# Patient Record
Sex: MALE | Race: WHITE | URBAN - METROPOLITAN AREA
[De-identification: names, ages, dates, MRNs, and addresses within clinical notes are randomized per-mention and may not be internally consistent; named-entity substitution may affect disease eponyms.]

---

## 2019-03-08 ENCOUNTER — HOSPITAL ENCOUNTER (OUTPATIENT)
Dept: MRI IMAGING | Age: 59
Discharge: HOME | End: 2019-03-10
Payer: COMMERCIAL

## 2019-03-08 DIAGNOSIS — R52 PAIN: ICD-10-CM

## 2019-03-08 PROCEDURE — 72158 MRI LUMBAR SPINE W/O & W/DYE: CPT

## 2019-03-08 PROCEDURE — 6360000004 HC RX CONTRAST MEDICATION: Performed by: RADIOLOGY

## 2019-03-08 PROCEDURE — A9579 GAD-BASE MR CONTRAST NOS,1ML: HCPCS | Performed by: RADIOLOGY

## 2019-03-08 RX ORDER — SODIUM CHLORIDE 0.9 % (FLUSH) 0.9 %
10 SYRINGE (ML) INJECTION 2 TIMES DAILY
Status: DISCONTINUED | OUTPATIENT
Start: 2019-03-08 | End: 2019-03-11 | Stop reason: HOSPADM

## 2019-03-08 RX ADMIN — GADOTERIDOL 20 ML: 279.3 INJECTION, SOLUTION INTRAVENOUS at 09:18

## 2024-04-02 ENCOUNTER — HOSPITAL ENCOUNTER (EMERGENCY)
Facility: HOSPITAL | Age: 64
Discharge: HOME | End: 2024-04-02
Payer: COMMERCIAL

## 2024-04-02 VITALS
BODY MASS INDEX: 40.8 KG/M2 | HEART RATE: 100 BPM | DIASTOLIC BLOOD PRESSURE: 85 MMHG | TEMPERATURE: 99.5 F | OXYGEN SATURATION: 96 % | RESPIRATION RATE: 20 BRPM | SYSTOLIC BLOOD PRESSURE: 150 MMHG | WEIGHT: 285 LBS | HEIGHT: 70 IN

## 2024-04-02 DIAGNOSIS — S39.012A LUMBAR STRAIN, INITIAL ENCOUNTER: Primary | ICD-10-CM

## 2024-04-02 PROCEDURE — 2500000005 HC RX 250 GENERAL PHARMACY W/O HCPCS: Performed by: NURSE PRACTITIONER

## 2024-04-02 PROCEDURE — 2500000001 HC RX 250 WO HCPCS SELF ADMINISTERED DRUGS (ALT 637 FOR MEDICARE OP): Performed by: NURSE PRACTITIONER

## 2024-04-02 PROCEDURE — 2500000004 HC RX 250 GENERAL PHARMACY W/ HCPCS (ALT 636 FOR OP/ED): Performed by: NURSE PRACTITIONER

## 2024-04-02 PROCEDURE — 96372 THER/PROPH/DIAG INJ SC/IM: CPT | Performed by: NURSE PRACTITIONER

## 2024-04-02 PROCEDURE — 99283 EMERGENCY DEPT VISIT LOW MDM: CPT

## 2024-04-02 RX ORDER — KETOROLAC TROMETHAMINE 30 MG/ML
15 INJECTION, SOLUTION INTRAMUSCULAR; INTRAVENOUS ONCE
Status: COMPLETED | OUTPATIENT
Start: 2024-04-02 | End: 2024-04-02

## 2024-04-02 RX ORDER — LIDOCAINE 50 MG/G
1 PATCH TOPICAL DAILY
Qty: 7 PATCH | Refills: 0 | Status: SHIPPED | OUTPATIENT
Start: 2024-04-02

## 2024-04-02 RX ORDER — HYDROCODONE BITARTRATE AND ACETAMINOPHEN 5; 325 MG/1; MG/1
1 TABLET ORAL EVERY 6 HOURS PRN
Qty: 12 TABLET | Refills: 0 | Status: SHIPPED | OUTPATIENT
Start: 2024-04-02 | End: 2024-04-18 | Stop reason: HOSPADM

## 2024-04-02 RX ORDER — HYDROCODONE BITARTRATE AND ACETAMINOPHEN 5; 325 MG/1; MG/1
1 TABLET ORAL ONCE
Status: COMPLETED | OUTPATIENT
Start: 2024-04-02 | End: 2024-04-02

## 2024-04-02 RX ORDER — LIDOCAINE 560 MG/1
1 PATCH PERCUTANEOUS; TOPICAL; TRANSDERMAL ONCE
Status: DISCONTINUED | OUTPATIENT
Start: 2024-04-02 | End: 2024-04-02 | Stop reason: HOSPADM

## 2024-04-02 RX ORDER — CYCLOBENZAPRINE HCL 10 MG
10 TABLET ORAL 2 TIMES DAILY PRN
Qty: 9 TABLET | Refills: 0 | Status: SHIPPED | OUTPATIENT
Start: 2024-04-02

## 2024-04-02 RX ORDER — NAPROXEN 500 MG/1
500 TABLET ORAL
Qty: 30 TABLET | Refills: 0 | Status: SHIPPED | OUTPATIENT
Start: 2024-04-02 | End: 2024-04-18 | Stop reason: HOSPADM

## 2024-04-02 RX ADMIN — HYDROCODONE BITARTRATE AND ACETAMINOPHEN 1 TABLET: 5; 325 TABLET ORAL at 19:06

## 2024-04-02 RX ADMIN — KETOROLAC TROMETHAMINE 15 MG: 30 INJECTION, SOLUTION INTRAMUSCULAR at 19:06

## 2024-04-02 RX ADMIN — LIDOCAINE 4% 1 PATCH: 40 PATCH TOPICAL at 19:06

## 2024-04-02 ASSESSMENT — PAIN DESCRIPTION - ONSET: ONSET: ONGOING

## 2024-04-02 ASSESSMENT — PAIN DESCRIPTION - PAIN TYPE: TYPE: ACUTE PAIN

## 2024-04-02 ASSESSMENT — PAIN - FUNCTIONAL ASSESSMENT
PAIN_FUNCTIONAL_ASSESSMENT: 0-10

## 2024-04-02 ASSESSMENT — PAIN DESCRIPTION - LOCATION: LOCATION: BACK

## 2024-04-02 ASSESSMENT — PAIN SCALES - GENERAL
PAINLEVEL_OUTOF10: 8
PAINLEVEL_OUTOF10: 8
PAINLEVEL_OUTOF10: 0 - NO PAIN

## 2024-04-02 ASSESSMENT — COLUMBIA-SUICIDE SEVERITY RATING SCALE - C-SSRS
2. HAVE YOU ACTUALLY HAD ANY THOUGHTS OF KILLING YOURSELF?: NO
1. IN THE PAST MONTH, HAVE YOU WISHED YOU WERE DEAD OR WISHED YOU COULD GO TO SLEEP AND NOT WAKE UP?: NO
6. HAVE YOU EVER DONE ANYTHING, STARTED TO DO ANYTHING, OR PREPARED TO DO ANYTHING TO END YOUR LIFE?: NO

## 2024-04-02 ASSESSMENT — PAIN DESCRIPTION - ORIENTATION: ORIENTATION: LOWER

## 2024-04-02 ASSESSMENT — PAIN DESCRIPTION - DESCRIPTORS: DESCRIPTORS: ACHING;PRESSURE

## 2024-04-02 ASSESSMENT — PAIN DESCRIPTION - FREQUENCY: FREQUENCY: CONSTANT/CONTINUOUS

## 2024-04-02 ASSESSMENT — PAIN DESCRIPTION - PROGRESSION: CLINICAL_PROGRESSION: NOT CHANGED

## 2024-04-02 NOTE — ED PROVIDER NOTES
HPI   Chief Complaint   Patient presents with    Back Pain     Lower back pain radiating down left leg.       63-year-old male presents emergency department, states about a week ago he was bending over to reach for something, felt a pulling pain in his right lower back.  Patient states since then has had low back pain.  States worse pain with position changes.  States he feels some numbness in his feet bilaterally, pain also radiates from the back down to the right hip area.    Denies incontinence of bowel or bladder, denies saddle anesthesia.    Patient states 11 years ago he did have back surgery for slipped disks, states he was told that they may cause problems in the future.      History provided by:  Patient   used: No                        Evie Coma Scale Score: 15                     Patient History   Past Medical History:   Diagnosis Date    Cirrhosis (CMS/HCC)     Diabetes mellitus (CMS/HCC)     Hypertension     Other conditions influencing health status 11/03/2013    Ureterocele    Personal history of other diseases of the digestive system     History of gastroesophageal reflux (GERD)    Personal history of other endocrine, nutritional and metabolic disease     History of hyperlipidemia     Past Surgical History:   Procedure Laterality Date    HERNIA REPAIR  07/30/2014    Hernia Repair    KNEE ARTHROSCOPY W/ DEBRIDEMENT  07/30/2014    Arthroscopy Knee Right    OTHER SURGICAL HISTORY  08/18/2014    Percutaneous Ablation Of Renal Tumor(S)    OTHER SURGICAL HISTORY  08/18/2014    Kidney Surgery Laparoscopic Partial Nephrectomy    TONSILLECTOMY  07/30/2014    Tonsillectomy     No family history on file.  Social History     Tobacco Use    Smoking status: Never     Passive exposure: Never    Smokeless tobacco: Never   Vaping Use    Vaping Use: Never used   Substance Use Topics    Alcohol use: Defer    Drug use: Never       Physical Exam   ED Triage Vitals   Temperature Heart Rate  Respirations BP   04/02/24 1833 04/02/24 1833 04/02/24 1833 04/02/24 1834   37.5 °C (99.5 °F) (!) 119 20 169/84      Pulse Ox Temp Source Heart Rate Source Patient Position   04/02/24 1834 04/02/24 1833 04/02/24 1833 04/02/24 1834   96 % Temporal Monitor Sitting      BP Location FiO2 (%)     04/02/24 1834 --     Right arm        Physical Exam  Back Pain:   Gen.: Vitals noted no distress. Afebrile.   Heart: Regular rate rhythm no murmur.   Lungs: Clear to auscultation bilaterally with good aeration and no adventitious breath sounds.   Abdomen: Soft, nontender, nonsurgical throughout. Normoactive bowel sounds. No pulsatile masses or abdominal bruits to auscultation.   Back: There is tenderness to palpation in the midline and paraspinal planes throughout the LS. Normal motor sensory, symmetric reflexes, strong equal peripheral pulses, normal Babinski's bilaterally.   Skin: No rash.   Neuro: No focal neurologic deficits, NIH score of 0.     ED Course & MDM   Diagnoses as of 04/02/24 1902   Lumbar strain, initial encounter       Medical Decision Making  Patient describes back pain, worse on the right side, rating around to the hip.    Describes some numbness in his feet but states does have history of neuropathy.  No saddle anesthesia or dysfunction of bowel or bladder.    No red flag symptoms today, discussed initiating medications for low back strain, close follow-up with Ortho spine for reevaluation and possible imaging at that time.    Patient given Toradol, hydrocodone/acetaminophen and a Lidoderm patch placed in the department, will be discharged home on similar medications, with the addition of Flexeril.    Discussed return with any worsening symptoms or any additional concerns.    Procedure  Procedures     Kavya Barrett, SONY-CNP  04/02/24 1907

## 2024-04-07 ENCOUNTER — APPOINTMENT (OUTPATIENT)
Dept: RADIOLOGY | Facility: HOSPITAL | Age: 64
End: 2024-04-07
Payer: COMMERCIAL

## 2024-04-07 ENCOUNTER — HOSPITAL ENCOUNTER (EMERGENCY)
Facility: HOSPITAL | Age: 64
Discharge: HOME | End: 2024-04-07
Payer: COMMERCIAL

## 2024-04-07 VITALS
BODY MASS INDEX: 42.89 KG/M2 | OXYGEN SATURATION: 99 % | RESPIRATION RATE: 18 BRPM | HEART RATE: 75 BPM | SYSTOLIC BLOOD PRESSURE: 105 MMHG | WEIGHT: 283 LBS | TEMPERATURE: 97.9 F | HEIGHT: 68 IN | DIASTOLIC BLOOD PRESSURE: 56 MMHG

## 2024-04-07 DIAGNOSIS — G89.29 ACUTE EXACERBATION OF CHRONIC LOW BACK PAIN: Primary | ICD-10-CM

## 2024-04-07 DIAGNOSIS — M54.50 ACUTE EXACERBATION OF CHRONIC LOW BACK PAIN: Primary | ICD-10-CM

## 2024-04-07 PROCEDURE — 2500000001 HC RX 250 WO HCPCS SELF ADMINISTERED DRUGS (ALT 637 FOR MEDICARE OP): Performed by: REGISTERED NURSE

## 2024-04-07 PROCEDURE — 99284 EMERGENCY DEPT VISIT MOD MDM: CPT | Mod: 25

## 2024-04-07 PROCEDURE — 96372 THER/PROPH/DIAG INJ SC/IM: CPT | Performed by: REGISTERED NURSE

## 2024-04-07 PROCEDURE — 2500000005 HC RX 250 GENERAL PHARMACY W/O HCPCS: Performed by: REGISTERED NURSE

## 2024-04-07 PROCEDURE — 72131 CT LUMBAR SPINE W/O DYE: CPT

## 2024-04-07 PROCEDURE — 2500000004 HC RX 250 GENERAL PHARMACY W/ HCPCS (ALT 636 FOR OP/ED): Performed by: REGISTERED NURSE

## 2024-04-07 PROCEDURE — 72131 CT LUMBAR SPINE W/O DYE: CPT | Mod: FOREIGN READ | Performed by: RADIOLOGY

## 2024-04-07 RX ORDER — OXYCODONE AND ACETAMINOPHEN 5; 325 MG/1; MG/1
1 TABLET ORAL ONCE
Status: COMPLETED | OUTPATIENT
Start: 2024-04-07 | End: 2024-04-07

## 2024-04-07 RX ORDER — ORPHENADRINE CITRATE 30 MG/ML
60 INJECTION INTRAMUSCULAR; INTRAVENOUS ONCE
Status: COMPLETED | OUTPATIENT
Start: 2024-04-07 | End: 2024-04-07

## 2024-04-07 RX ORDER — OXYCODONE AND ACETAMINOPHEN 5; 325 MG/1; MG/1
1 TABLET ORAL EVERY 6 HOURS PRN
Qty: 5 TABLET | Refills: 0 | Status: ON HOLD | OUTPATIENT
Start: 2024-04-07 | End: 2024-04-18

## 2024-04-07 RX ORDER — LIDOCAINE 560 MG/1
1 PATCH PERCUTANEOUS; TOPICAL; TRANSDERMAL ONCE
Status: DISCONTINUED | OUTPATIENT
Start: 2024-04-07 | End: 2024-04-07 | Stop reason: HOSPADM

## 2024-04-07 RX ORDER — KETOROLAC TROMETHAMINE 30 MG/ML
30 INJECTION, SOLUTION INTRAMUSCULAR; INTRAVENOUS ONCE
Status: COMPLETED | OUTPATIENT
Start: 2024-04-07 | End: 2024-04-07

## 2024-04-07 RX ADMIN — OXYCODONE HYDROCHLORIDE AND ACETAMINOPHEN 1 TABLET: 5; 325 TABLET ORAL at 10:01

## 2024-04-07 RX ADMIN — LIDOCAINE 1 PATCH: 4 PATCH TOPICAL at 10:01

## 2024-04-07 RX ADMIN — ORPHENADRINE CITRATE 60 MG: 60 INJECTION INTRAMUSCULAR; INTRAVENOUS at 10:01

## 2024-04-07 RX ADMIN — KETOROLAC TROMETHAMINE 30 MG: 30 INJECTION, SOLUTION INTRAMUSCULAR at 10:01

## 2024-04-07 ASSESSMENT — LIFESTYLE VARIABLES
HAVE YOU EVER FELT YOU SHOULD CUT DOWN ON YOUR DRINKING: NO
EVER FELT BAD OR GUILTY ABOUT YOUR DRINKING: NO
EVER HAD A DRINK FIRST THING IN THE MORNING TO STEADY YOUR NERVES TO GET RID OF A HANGOVER: NO
TOTAL SCORE: 0
HAVE PEOPLE ANNOYED YOU BY CRITICIZING YOUR DRINKING: NO

## 2024-04-07 ASSESSMENT — PAIN SCALES - GENERAL
PAINLEVEL_OUTOF10: 8
PAINLEVEL_OUTOF10: 5 - MODERATE PAIN

## 2024-04-07 ASSESSMENT — COLUMBIA-SUICIDE SEVERITY RATING SCALE - C-SSRS
1. IN THE PAST MONTH, HAVE YOU WISHED YOU WERE DEAD OR WISHED YOU COULD GO TO SLEEP AND NOT WAKE UP?: NO
2. HAVE YOU ACTUALLY HAD ANY THOUGHTS OF KILLING YOURSELF?: NO
6. HAVE YOU EVER DONE ANYTHING, STARTED TO DO ANYTHING, OR PREPARED TO DO ANYTHING TO END YOUR LIFE?: NO

## 2024-04-07 ASSESSMENT — PAIN - FUNCTIONAL ASSESSMENT
PAIN_FUNCTIONAL_ASSESSMENT: 0-10
PAIN_FUNCTIONAL_ASSESSMENT: 0-10

## 2024-04-07 NOTE — ED PROVIDER NOTES
HPI   Chief Complaint   Patient presents with    Back Pain     Right lower back pain seen last week also         History provided by:  Patient and spouse   used: No      63-year-old male with past medical history significant for cirrhosis, diabetes, hypertension, dyslipidemia, remote renal cell cancer, and chronic lower back pain presents to the emergency department today for evaluation of right lower back pain.  Patient was initially seen examined on 4/2/2024 and was discharged home with medications to ease symptoms.  Patient was directed to follow-up with his neurosurgeon.  Patient tells me that he did reach out to this provider but was told to go to pain management first.  Patient tells me that he has an appointment with his pain management doctor on 4/16/2024.  Patient tells me that his pain doctor only gives him gabapentin and is not provide any type of injections nor does he feel relief of symptoms.  Patient denies any new or worsening symptoms he tells me that the pain is the same in his right lower back and radiates down his right leg.  Patient denies any injury or trauma.  Patient does report associated symptoms of numbness in his bilateral feet however he tells me that this is secondary to his neuropathy.  Patient denies saddle anesthesia, or dysfunction of bowel or bladder.                  Evie Coma Scale Score: 15                     Patient History   Past Medical History:   Diagnosis Date    Cirrhosis (CMS/HCC)     Diabetes mellitus (CMS/HCC)     Hypertension     Other conditions influencing health status 11/03/2013    Ureterocele    Personal history of other diseases of the digestive system     History of gastroesophageal reflux (GERD)    Personal history of other endocrine, nutritional and metabolic disease     History of hyperlipidemia     Past Surgical History:   Procedure Laterality Date    HERNIA REPAIR  07/30/2014    Hernia Repair    KNEE ARTHROSCOPY W/ DEBRIDEMENT   07/30/2014    Arthroscopy Knee Right    OTHER SURGICAL HISTORY  08/18/2014    Percutaneous Ablation Of Renal Tumor(S)    OTHER SURGICAL HISTORY  08/18/2014    Kidney Surgery Laparoscopic Partial Nephrectomy    TONSILLECTOMY  07/30/2014    Tonsillectomy     No family history on file.  Social History     Tobacco Use    Smoking status: Never     Passive exposure: Never    Smokeless tobacco: Never   Vaping Use    Vaping Use: Never used   Substance Use Topics    Alcohol use: Defer    Drug use: Never       Physical Exam   ED Triage Vitals [04/07/24 0932]   Temperature Heart Rate Respirations BP   36.6 °C (97.9 °F) 91 16 --      Pulse Ox Temp src Heart Rate Source Patient Position   98 % -- -- --      BP Location FiO2 (%)     -- --       Physical Exam  Vitals and nursing note reviewed.   Constitutional:       Appearance: Normal appearance.   Cardiovascular:      Rate and Rhythm: Normal rate.      Pulses: Normal pulses.   Pulmonary:      Effort: Pulmonary effort is normal.   Musculoskeletal:      Right hip: Tenderness (right paraspinal tenderness) present.   Skin:     General: Skin is warm and dry.      Capillary Refill: Capillary refill takes less than 2 seconds.   Neurological:      General: No focal deficit present.      Mental Status: He is alert and oriented to person, place, and time.   Psychiatric:         Mood and Affect: Mood normal.         Behavior: Behavior normal.         ED Course & MDM   Diagnoses as of 04/07/24 1208   Acute exacerbation of chronic low back pain       Medical Decision Making  Patient seen examined emergency department; patient is chronically ill in appearance but does not appear in acute distress.  Patient is ambulating steadily with his cane and this is his baseline.  Patient does have pain with palpation of the right paraspinal musculature of the lumbar spine.  Patient is able to extend and flex knees.  Patient is able to point toes.  Patient's light touch sensation of the inner thigh is  intact.  Discussion held with patient and his wife regarding imaging and pain management.  They tell me they do not have a neurosurgeon at this time as the neurosurgeon they saw in the previous group left.  They have not established with a new 1.  At this time will order CT imaging of the lumbar spine for further evaluation of patient's chronic lower back pain.  Patient will be given IM Toradol IM Norflex, Percocet and Norflex for his pain.  Additionally we discussed following up with  orthopedics as I do have a walk-in clinic and a spine surgeon available in their group.    CT imaging does show healed wedge compression deformity at T12 as well as ossification of the posterior longitudinal ligament which is leading to impingement on the thecal sac from T12-L1.    Upon reevaluation patient tells me he is feeling better post administration of medications.  Patient tells me that the insurance would not cover the lidocaine patch that he was discharged with from his last visit.  Patient does have a patch on I did tell him this will cover him for 12 hours.  Patient will additionally be sent with a short course of narcotics.  Patient arrived with [facility] for orthopedics for follow-up.  Patient encouraged to call on Monday or self present to the walk-in clinic.  All patient's questions and concerns were addressed prior to discharge.  Patient discharged home in stable condition.    Labs Reviewed - No data to display    CT lumbar spine wo IV contrast   Final Result   1.  HEALED MILD WEDGE COMPRESSION DEFORMITY AT T12.  NO EVIDENCE OF   ACUTE OSSEOUS PATHOLOGY.   2.  THERE IS OSSIFICATION OF POSTERIOR LONGITUDINAL LIGAMENT WHICH   LEADS TO IMPINGEMENT UPON THE THECAL SAC FROM THE T12-L1 LEVEL THROUGH   THE L4-L5 LEVEL.  CENTRAL STENOSIS IS NOT PRESENT.       Signed by Papi Goodson MD            Procedure  Procedures     Rose Mckeon, SONY-CNP  04/07/24 8044

## 2024-04-10 ENCOUNTER — APPOINTMENT (OUTPATIENT)
Dept: ORTHOPEDIC SURGERY | Facility: CLINIC | Age: 64
End: 2024-04-10
Payer: COMMERCIAL

## 2024-04-14 ENCOUNTER — APPOINTMENT (OUTPATIENT)
Dept: RADIOLOGY | Facility: HOSPITAL | Age: 64
DRG: 552 | End: 2024-04-14
Payer: MEDICARE

## 2024-04-14 ENCOUNTER — HOSPITAL ENCOUNTER (INPATIENT)
Facility: HOSPITAL | Age: 64
LOS: 2 days | Discharge: HOME | DRG: 552 | End: 2024-04-18
Attending: STUDENT IN AN ORGANIZED HEALTH CARE EDUCATION/TRAINING PROGRAM | Admitting: STUDENT IN AN ORGANIZED HEALTH CARE EDUCATION/TRAINING PROGRAM
Payer: MEDICARE

## 2024-04-14 DIAGNOSIS — M54.9 INTRACTABLE BACK PAIN: Primary | ICD-10-CM

## 2024-04-14 DIAGNOSIS — M54.40 BACK PAIN OF LUMBOSACRAL REGION WITH SCIATICA: ICD-10-CM

## 2024-04-14 DIAGNOSIS — G89.29 ACUTE EXACERBATION OF CHRONIC LOW BACK PAIN: ICD-10-CM

## 2024-04-14 DIAGNOSIS — M79.89 REDNESS AND SWELLING OF UPPER ARM: ICD-10-CM

## 2024-04-14 DIAGNOSIS — R23.8 REDNESS AND SWELLING OF UPPER ARM: ICD-10-CM

## 2024-04-14 DIAGNOSIS — M54.50 ACUTE EXACERBATION OF CHRONIC LOW BACK PAIN: ICD-10-CM

## 2024-04-14 LAB
ALBUMIN SERPL BCP-MCNC: 4.3 G/DL (ref 3.4–5)
ALP SERPL-CCNC: 36 U/L (ref 33–136)
ALT SERPL W P-5'-P-CCNC: 43 U/L (ref 10–52)
ANION GAP SERPL CALC-SCNC: 14 MMOL/L (ref 10–20)
AST SERPL W P-5'-P-CCNC: 50 U/L (ref 9–39)
BASOPHILS # BLD AUTO: 0.04 X10*3/UL (ref 0–0.1)
BASOPHILS NFR BLD AUTO: 0.6 %
BILIRUB SERPL-MCNC: 0.7 MG/DL (ref 0–1.2)
BUN SERPL-MCNC: 23 MG/DL (ref 6–23)
CALCIUM SERPL-MCNC: 9.6 MG/DL (ref 8.6–10.3)
CHLORIDE SERPL-SCNC: 100 MMOL/L (ref 98–107)
CO2 SERPL-SCNC: 25 MMOL/L (ref 21–32)
CREAT SERPL-MCNC: 1 MG/DL (ref 0.5–1.3)
CRP SERPL-MCNC: 0.3 MG/DL
EGFRCR SERPLBLD CKD-EPI 2021: 86 ML/MIN/1.73M*2
EOSINOPHIL # BLD AUTO: 0.27 X10*3/UL (ref 0–0.7)
EOSINOPHIL NFR BLD AUTO: 4.2 %
ERYTHROCYTE [DISTWIDTH] IN BLOOD BY AUTOMATED COUNT: 13 % (ref 11.5–14.5)
ERYTHROCYTE [SEDIMENTATION RATE] IN BLOOD BY WESTERGREN METHOD: 28 MM/H (ref 0–20)
GLUCOSE BLD MANUAL STRIP-MCNC: 374 MG/DL (ref 74–99)
GLUCOSE SERPL-MCNC: 128 MG/DL (ref 74–99)
HCT VFR BLD AUTO: 36.1 % (ref 41–52)
HGB BLD-MCNC: 12.5 G/DL (ref 13.5–17.5)
IMM GRANULOCYTES # BLD AUTO: 0.03 X10*3/UL (ref 0–0.7)
IMM GRANULOCYTES NFR BLD AUTO: 0.5 % (ref 0–0.9)
LYMPHOCYTES # BLD AUTO: 1.77 X10*3/UL (ref 1.2–4.8)
LYMPHOCYTES NFR BLD AUTO: 27.8 %
MCH RBC QN AUTO: 30.7 PG (ref 26–34)
MCHC RBC AUTO-ENTMCNC: 34.6 G/DL (ref 32–36)
MCV RBC AUTO: 89 FL (ref 80–100)
MONOCYTES # BLD AUTO: 0.26 X10*3/UL (ref 0.1–1)
MONOCYTES NFR BLD AUTO: 4.1 %
NEUTROPHILS # BLD AUTO: 3.99 X10*3/UL (ref 1.2–7.7)
NEUTROPHILS NFR BLD AUTO: 62.8 %
NRBC BLD-RTO: 0 /100 WBCS (ref 0–0)
PLATELET # BLD AUTO: 174 X10*3/UL (ref 150–450)
POTASSIUM SERPL-SCNC: 4.3 MMOL/L (ref 3.5–5.3)
PROT SERPL-MCNC: 8.3 G/DL (ref 6.4–8.2)
RBC # BLD AUTO: 4.07 X10*6/UL (ref 4.5–5.9)
SODIUM SERPL-SCNC: 135 MMOL/L (ref 136–145)
WBC # BLD AUTO: 6.4 X10*3/UL (ref 4.4–11.3)

## 2024-04-14 PROCEDURE — G0378 HOSPITAL OBSERVATION PER HR: HCPCS

## 2024-04-14 PROCEDURE — 82947 ASSAY GLUCOSE BLOOD QUANT: CPT

## 2024-04-14 PROCEDURE — 2500000002 HC RX 250 W HCPCS SELF ADMINISTERED DRUGS (ALT 637 FOR MEDICARE OP, ALT 636 FOR OP/ED): Performed by: STUDENT IN AN ORGANIZED HEALTH CARE EDUCATION/TRAINING PROGRAM

## 2024-04-14 PROCEDURE — 86140 C-REACTIVE PROTEIN: CPT | Performed by: PHYSICIAN ASSISTANT

## 2024-04-14 PROCEDURE — 96372 THER/PROPH/DIAG INJ SC/IM: CPT | Performed by: STUDENT IN AN ORGANIZED HEALTH CARE EDUCATION/TRAINING PROGRAM

## 2024-04-14 PROCEDURE — 96375 TX/PRO/DX INJ NEW DRUG ADDON: CPT

## 2024-04-14 PROCEDURE — 80053 COMPREHEN METABOLIC PANEL: CPT | Performed by: PHYSICIAN ASSISTANT

## 2024-04-14 PROCEDURE — 72146 MRI CHEST SPINE W/O DYE: CPT | Performed by: RADIOLOGY

## 2024-04-14 PROCEDURE — 36415 COLL VENOUS BLD VENIPUNCTURE: CPT | Performed by: PHYSICIAN ASSISTANT

## 2024-04-14 PROCEDURE — 99285 EMERGENCY DEPT VISIT HI MDM: CPT | Mod: 25

## 2024-04-14 PROCEDURE — 85025 COMPLETE CBC W/AUTO DIFF WBC: CPT | Performed by: PHYSICIAN ASSISTANT

## 2024-04-14 PROCEDURE — 72146 MRI CHEST SPINE W/O DYE: CPT

## 2024-04-14 PROCEDURE — 2500000004 HC RX 250 GENERAL PHARMACY W/ HCPCS (ALT 636 FOR OP/ED): Performed by: PHYSICIAN ASSISTANT

## 2024-04-14 PROCEDURE — 85652 RBC SED RATE AUTOMATED: CPT | Performed by: PHYSICIAN ASSISTANT

## 2024-04-14 PROCEDURE — 96374 THER/PROPH/DIAG INJ IV PUSH: CPT | Mod: 59

## 2024-04-14 PROCEDURE — 99222 1ST HOSP IP/OBS MODERATE 55: CPT | Performed by: STUDENT IN AN ORGANIZED HEALTH CARE EDUCATION/TRAINING PROGRAM

## 2024-04-14 PROCEDURE — 2500000004 HC RX 250 GENERAL PHARMACY W/ HCPCS (ALT 636 FOR OP/ED): Performed by: STUDENT IN AN ORGANIZED HEALTH CARE EDUCATION/TRAINING PROGRAM

## 2024-04-14 RX ORDER — LIDOCAINE 560 MG/1
2 PATCH PERCUTANEOUS; TOPICAL; TRANSDERMAL DAILY
Status: DISCONTINUED | OUTPATIENT
Start: 2024-04-15 | End: 2024-04-18 | Stop reason: HOSPADM

## 2024-04-14 RX ORDER — NAPROXEN SODIUM 220 MG/1
81 TABLET, FILM COATED ORAL DAILY
COMMUNITY

## 2024-04-14 RX ORDER — AMLODIPINE BESYLATE 5 MG/1
10 TABLET ORAL DAILY
Status: DISCONTINUED | OUTPATIENT
Start: 2024-04-15 | End: 2024-04-18 | Stop reason: HOSPADM

## 2024-04-14 RX ORDER — POLYETHYLENE GLYCOL 3350 17 G/17G
17 POWDER, FOR SOLUTION ORAL DAILY
Status: DISCONTINUED | OUTPATIENT
Start: 2024-04-15 | End: 2024-04-18 | Stop reason: HOSPADM

## 2024-04-14 RX ORDER — INSULIN GLARGINE 300 [IU]/ML
100 INJECTION, SOLUTION SUBCUTANEOUS 2 TIMES DAILY
COMMUNITY
Start: 2024-03-08

## 2024-04-14 RX ORDER — FENTANYL CITRATE 50 UG/ML
50 INJECTION, SOLUTION INTRAMUSCULAR; INTRAVENOUS ONCE
Status: COMPLETED | OUTPATIENT
Start: 2024-04-14 | End: 2024-04-14

## 2024-04-14 RX ORDER — HYDROMORPHONE HYDROCHLORIDE 1 MG/ML
0.6 INJECTION, SOLUTION INTRAMUSCULAR; INTRAVENOUS; SUBCUTANEOUS
Status: DISCONTINUED | OUTPATIENT
Start: 2024-04-14 | End: 2024-04-16

## 2024-04-14 RX ORDER — AMLODIPINE BESYLATE 10 MG/1
10 TABLET ORAL
COMMUNITY
Start: 2024-01-02

## 2024-04-14 RX ORDER — DEXTROSE 50 % IN WATER (D50W) INTRAVENOUS SYRINGE
25
Status: DISCONTINUED | OUTPATIENT
Start: 2024-04-14 | End: 2024-04-18 | Stop reason: HOSPADM

## 2024-04-14 RX ORDER — HYDROMORPHONE HYDROCHLORIDE 1 MG/ML
1 INJECTION, SOLUTION INTRAMUSCULAR; INTRAVENOUS; SUBCUTANEOUS ONCE
Status: COMPLETED | OUTPATIENT
Start: 2024-04-14 | End: 2024-04-14

## 2024-04-14 RX ORDER — CARVEDILOL 6.25 MG/1
6.25 TABLET ORAL 2 TIMES DAILY
COMMUNITY
Start: 2024-04-04

## 2024-04-14 RX ORDER — METFORMIN HYDROCHLORIDE 500 MG/1
1000 TABLET ORAL
COMMUNITY
Start: 2014-08-18

## 2024-04-14 RX ORDER — ONDANSETRON HYDROCHLORIDE 2 MG/ML
4 INJECTION, SOLUTION INTRAVENOUS EVERY 4 HOURS PRN
Status: DISCONTINUED | OUTPATIENT
Start: 2024-04-14 | End: 2024-04-18 | Stop reason: HOSPADM

## 2024-04-14 RX ORDER — INSULIN GLARGINE 100 [IU]/ML
68 INJECTION, SOLUTION SUBCUTANEOUS EVERY 12 HOURS
Status: DISCONTINUED | OUTPATIENT
Start: 2024-04-14 | End: 2024-04-18 | Stop reason: HOSPADM

## 2024-04-14 RX ORDER — ACETAMINOPHEN 325 MG/1
650 TABLET ORAL EVERY 6 HOURS
Status: DISCONTINUED | OUTPATIENT
Start: 2024-04-14 | End: 2024-04-18 | Stop reason: HOSPADM

## 2024-04-14 RX ORDER — GLIMEPIRIDE 4 MG/1
1 TABLET ORAL
COMMUNITY
Start: 2023-09-26

## 2024-04-14 RX ORDER — CARVEDILOL 6.25 MG/1
6.25 TABLET ORAL
Status: DISCONTINUED | OUTPATIENT
Start: 2024-04-15 | End: 2024-04-18 | Stop reason: HOSPADM

## 2024-04-14 RX ORDER — KETOROLAC TROMETHAMINE 30 MG/ML
30 INJECTION, SOLUTION INTRAMUSCULAR; INTRAVENOUS ONCE
Status: COMPLETED | OUTPATIENT
Start: 2024-04-14 | End: 2024-04-14

## 2024-04-14 RX ORDER — LORAZEPAM 2 MG/ML
1 INJECTION INTRAMUSCULAR ONCE
Status: DISCONTINUED | OUTPATIENT
Start: 2024-04-14 | End: 2024-04-14

## 2024-04-14 RX ORDER — OMEPRAZOLE 40 MG/1
40 CAPSULE, DELAYED RELEASE ORAL
COMMUNITY
Start: 2024-03-07

## 2024-04-14 RX ORDER — ONDANSETRON HYDROCHLORIDE 2 MG/ML
4 INJECTION, SOLUTION INTRAVENOUS ONCE
Status: COMPLETED | OUTPATIENT
Start: 2024-04-14 | End: 2024-04-14

## 2024-04-14 RX ORDER — INSULIN LISPRO 100 [IU]/ML
26 INJECTION, SOLUTION INTRAVENOUS; SUBCUTANEOUS
Status: DISCONTINUED | OUTPATIENT
Start: 2024-04-14 | End: 2024-04-18 | Stop reason: HOSPADM

## 2024-04-14 RX ORDER — FENOFIBRATE 134 MG/1
1 CAPSULE ORAL DAILY
COMMUNITY
Start: 2013-01-17

## 2024-04-14 RX ORDER — HYDROCHLOROTHIAZIDE 25 MG/1
1 TABLET ORAL
COMMUNITY
Start: 2024-01-26

## 2024-04-14 RX ORDER — ATORVASTATIN CALCIUM 20 MG/1
20 TABLET, FILM COATED ORAL
COMMUNITY
Start: 2015-10-01

## 2024-04-14 RX ORDER — LORAZEPAM 2 MG/ML
1 INJECTION INTRAMUSCULAR ONCE
Status: COMPLETED | OUTPATIENT
Start: 2024-04-14 | End: 2024-04-14

## 2024-04-14 RX ORDER — DEXTROSE 50 % IN WATER (D50W) INTRAVENOUS SYRINGE
12.5
Status: DISCONTINUED | OUTPATIENT
Start: 2024-04-14 | End: 2024-04-18 | Stop reason: HOSPADM

## 2024-04-14 RX ORDER — ACETAMINOPHEN 650 MG/1
650 SUPPOSITORY RECTAL EVERY 4 HOURS PRN
Status: DISCONTINUED | OUTPATIENT
Start: 2024-04-14 | End: 2024-04-18 | Stop reason: HOSPADM

## 2024-04-14 RX ORDER — INSULIN LISPRO 200 [IU]/ML
35 INJECTION, SOLUTION SUBCUTANEOUS
COMMUNITY
Start: 2024-03-08

## 2024-04-14 RX ORDER — ENOXAPARIN SODIUM 100 MG/ML
40 INJECTION SUBCUTANEOUS EVERY 12 HOURS SCHEDULED
Status: DISCONTINUED | OUTPATIENT
Start: 2024-04-14 | End: 2024-04-18 | Stop reason: HOSPADM

## 2024-04-14 RX ORDER — GABAPENTIN 300 MG/1
300 CAPSULE ORAL NIGHTLY
COMMUNITY

## 2024-04-14 RX ORDER — ICOSAPENT ETHYL 1 G/1
2 CAPSULE ORAL
COMMUNITY
Start: 2015-10-19

## 2024-04-14 RX ORDER — LOSARTAN POTASSIUM 100 MG/1
100 TABLET ORAL DAILY
Status: DISCONTINUED | OUTPATIENT
Start: 2024-04-15 | End: 2024-04-18 | Stop reason: HOSPADM

## 2024-04-14 RX ORDER — ACETAMINOPHEN 160 MG/5ML
650 SOLUTION ORAL EVERY 4 HOURS PRN
Status: DISCONTINUED | OUTPATIENT
Start: 2024-04-14 | End: 2024-04-18 | Stop reason: HOSPADM

## 2024-04-14 RX ORDER — NAPROXEN SODIUM 220 MG/1
81 TABLET, FILM COATED ORAL DAILY
Status: DISCONTINUED | OUTPATIENT
Start: 2024-04-15 | End: 2024-04-18 | Stop reason: HOSPADM

## 2024-04-14 RX ORDER — LOSARTAN POTASSIUM 100 MG/1
100 TABLET ORAL
COMMUNITY
Start: 2024-01-02

## 2024-04-14 RX ORDER — TALC
3 POWDER (GRAM) TOPICAL NIGHTLY PRN
Status: DISCONTINUED | OUTPATIENT
Start: 2024-04-14 | End: 2024-04-18 | Stop reason: HOSPADM

## 2024-04-14 RX ORDER — ACETAMINOPHEN 325 MG/1
650 TABLET ORAL EVERY 4 HOURS PRN
Status: DISCONTINUED | OUTPATIENT
Start: 2024-04-14 | End: 2024-04-18 | Stop reason: HOSPADM

## 2024-04-14 RX ORDER — PANTOPRAZOLE SODIUM 40 MG/1
40 TABLET, DELAYED RELEASE ORAL
Status: DISCONTINUED | OUTPATIENT
Start: 2024-04-15 | End: 2024-04-18 | Stop reason: HOSPADM

## 2024-04-14 RX ORDER — ATORVASTATIN CALCIUM 20 MG/1
20 TABLET, FILM COATED ORAL NIGHTLY
Status: DISCONTINUED | OUTPATIENT
Start: 2024-04-15 | End: 2024-04-18 | Stop reason: HOSPADM

## 2024-04-14 RX ADMIN — HYDROMORPHONE HYDROCHLORIDE 0.6 MG: 1 INJECTION, SOLUTION INTRAMUSCULAR; INTRAVENOUS; SUBCUTANEOUS at 22:52

## 2024-04-14 RX ADMIN — ENOXAPARIN SODIUM 40 MG: 40 INJECTION SUBCUTANEOUS at 22:57

## 2024-04-14 RX ADMIN — FENTANYL CITRATE 50 MCG: 50 INJECTION, SOLUTION INTRAMUSCULAR; INTRAVENOUS at 19:59

## 2024-04-14 RX ADMIN — HYDROMORPHONE HYDROCHLORIDE 1 MG: 1 INJECTION, SOLUTION INTRAMUSCULAR; INTRAVENOUS; SUBCUTANEOUS at 12:25

## 2024-04-14 RX ADMIN — INSULIN GLARGINE 68 UNITS: 100 INJECTION, SOLUTION SUBCUTANEOUS at 22:57

## 2024-04-14 RX ADMIN — ONDANSETRON 4 MG: 2 INJECTION INTRAMUSCULAR; INTRAVENOUS at 12:24

## 2024-04-14 RX ADMIN — METHYLPREDNISOLONE SODIUM SUCCINATE 125 MG: 125 INJECTION, POWDER, FOR SOLUTION INTRAMUSCULAR; INTRAVENOUS at 12:24

## 2024-04-14 RX ADMIN — LORAZEPAM 1 MG: 2 INJECTION INTRAMUSCULAR; INTRAVENOUS at 16:25

## 2024-04-14 RX ADMIN — KETOROLAC TROMETHAMINE 30 MG: 30 INJECTION, SOLUTION INTRAMUSCULAR at 12:24

## 2024-04-14 SDOH — SOCIAL STABILITY: SOCIAL INSECURITY: DO YOU FEEL ANYONE HAS EXPLOITED OR TAKEN ADVANTAGE OF YOU FINANCIALLY OR OF YOUR PERSONAL PROPERTY?: NO

## 2024-04-14 SDOH — SOCIAL STABILITY: SOCIAL INSECURITY: DO YOU FEEL UNSAFE GOING BACK TO THE PLACE WHERE YOU ARE LIVING?: NO

## 2024-04-14 SDOH — SOCIAL STABILITY: SOCIAL INSECURITY: ARE YOU OR HAVE YOU BEEN THREATENED OR ABUSED PHYSICALLY, EMOTIONALLY, OR SEXUALLY BY ANYONE?: NO

## 2024-04-14 SDOH — SOCIAL STABILITY: SOCIAL INSECURITY: HAS ANYONE EVER THREATENED TO HURT YOUR FAMILY OR YOUR PETS?: NO

## 2024-04-14 SDOH — SOCIAL STABILITY: SOCIAL INSECURITY: WERE YOU ABLE TO COMPLETE ALL THE BEHAVIORAL HEALTH SCREENINGS?: YES

## 2024-04-14 SDOH — SOCIAL STABILITY: SOCIAL INSECURITY: ARE THERE ANY APPARENT SIGNS OF INJURIES/BEHAVIORS THAT COULD BE RELATED TO ABUSE/NEGLECT?: NO

## 2024-04-14 SDOH — SOCIAL STABILITY: SOCIAL INSECURITY: ABUSE: ADULT

## 2024-04-14 SDOH — SOCIAL STABILITY: SOCIAL INSECURITY: HAVE YOU HAD THOUGHTS OF HARMING ANYONE ELSE?: NO

## 2024-04-14 SDOH — SOCIAL STABILITY: SOCIAL INSECURITY: DOES ANYONE TRY TO KEEP YOU FROM HAVING/CONTACTING OTHER FRIENDS OR DOING THINGS OUTSIDE YOUR HOME?: NO

## 2024-04-14 ASSESSMENT — PATIENT HEALTH QUESTIONNAIRE - PHQ9
1. LITTLE INTEREST OR PLEASURE IN DOING THINGS: NOT AT ALL
SUM OF ALL RESPONSES TO PHQ9 QUESTIONS 1 & 2: 0
2. FEELING DOWN, DEPRESSED OR HOPELESS: NOT AT ALL

## 2024-04-14 ASSESSMENT — PAIN - FUNCTIONAL ASSESSMENT
PAIN_FUNCTIONAL_ASSESSMENT: 0-10

## 2024-04-14 ASSESSMENT — ACTIVITIES OF DAILY LIVING (ADL)
ADEQUATE_TO_COMPLETE_ADL: YES
HEARING - LEFT EAR: FUNCTIONAL
DRESSING YOURSELF: INDEPENDENT
GROOMING: INDEPENDENT
WALKS IN HOME: INDEPENDENT
FEEDING YOURSELF: INDEPENDENT
TOILETING: INDEPENDENT
PATIENT'S MEMORY ADEQUATE TO SAFELY COMPLETE DAILY ACTIVITIES?: YES
HEARING - RIGHT EAR: FUNCTIONAL
LACK_OF_TRANSPORTATION: PATIENT DECLINED
BATHING: INDEPENDENT
JUDGMENT_ADEQUATE_SAFELY_COMPLETE_DAILY_ACTIVITIES: YES

## 2024-04-14 ASSESSMENT — PAIN SCALES - GENERAL
PAINLEVEL_OUTOF10: 10 - WORST POSSIBLE PAIN
PAINLEVEL_OUTOF10: 10 - WORST POSSIBLE PAIN
PAINLEVEL_OUTOF10: 3
PAINLEVEL_OUTOF10: 8
PAINLEVEL_OUTOF10: 6
PAINLEVEL_OUTOF10: 10 - WORST POSSIBLE PAIN
PAINLEVEL_OUTOF10: 2
PAINLEVEL_OUTOF10: 3
PAINLEVEL_OUTOF10: 10 - WORST POSSIBLE PAIN

## 2024-04-14 ASSESSMENT — PAIN DESCRIPTION - DESCRIPTORS: DESCRIPTORS: ACHING

## 2024-04-14 ASSESSMENT — LIFESTYLE VARIABLES
HAVE YOU EVER FELT YOU SHOULD CUT DOWN ON YOUR DRINKING: NO
HAVE PEOPLE ANNOYED YOU BY CRITICIZING YOUR DRINKING: NO
EVER HAD A DRINK FIRST THING IN THE MORNING TO STEADY YOUR NERVES TO GET RID OF A HANGOVER: NO
AUDIT-C TOTAL SCORE: 0
HOW OFTEN DO YOU HAVE 6 OR MORE DRINKS ON ONE OCCASION: NEVER
EVER FELT BAD OR GUILTY ABOUT YOUR DRINKING: NO
TOTAL SCORE: 0
HOW OFTEN DO YOU HAVE A DRINK CONTAINING ALCOHOL: NEVER
HOW MANY STANDARD DRINKS CONTAINING ALCOHOL DO YOU HAVE ON A TYPICAL DAY: PATIENT DOES NOT DRINK
SKIP TO QUESTIONS 9-10: 1
AUDIT-C TOTAL SCORE: 0

## 2024-04-14 ASSESSMENT — COGNITIVE AND FUNCTIONAL STATUS - GENERAL
PATIENT BASELINE BEDBOUND: NO
DRESSING REGULAR LOWER BODY CLOTHING: A LITTLE
WALKING IN HOSPITAL ROOM: A LITTLE
CLIMB 3 TO 5 STEPS WITH RAILING: A LITTLE
DAILY ACTIVITIY SCORE: 23
MOBILITY SCORE: 22

## 2024-04-14 ASSESSMENT — PAIN DESCRIPTION - FREQUENCY: FREQUENCY: CONSTANT/CONTINUOUS

## 2024-04-14 ASSESSMENT — PAIN DESCRIPTION - ORIENTATION
ORIENTATION: LOWER

## 2024-04-14 ASSESSMENT — PAIN DESCRIPTION - PROGRESSION: CLINICAL_PROGRESSION: NOT CHANGED

## 2024-04-14 ASSESSMENT — PAIN DESCRIPTION - LOCATION
LOCATION: BACK

## 2024-04-14 ASSESSMENT — PAIN DESCRIPTION - ONSET: ONSET: PROGRESSIVE

## 2024-04-14 ASSESSMENT — PAIN DESCRIPTION - PAIN TYPE: TYPE: ACUTE PAIN

## 2024-04-14 NOTE — PROGRESS NOTES
63-year-old male signed out to me at 1645 pending MRI results and admission.  Patient has history of lumbar herniated disc which has been under control after a prior surgery until several days ago when he suddenly began having low back pain radiating into his left leg.  He does not have any cauda equina symptoms or spinal cord impingement symptoms but he is unable to walk due to pain.  The prior provider was going to admit him for intractable back pain but the hospitalist requested MRIs of the thoracic and lumbar spine before admission.  Patient had snf through his MRI and began panicking and so the MRI was suspended.  I ordered more pain medication and Ativan but the MRI technologist left for the day before the patient could be taken back down.  I canceled the Ativan but we did go ahead and give him the fentanyl because he is having more pain.  I discussed the case with our hospitalist, Dr. Pang who kindly agrees to admit the patient and have the MRI done in the morning.  I discussed the results and plan for hospitalization with the patient and or family/friend if present. Questions were addressed. Patient and/or family/friend expressed understanding and agreement.

## 2024-04-14 NOTE — ED PROVIDER NOTES
HPI   Chief Complaint   Patient presents with    Back Pain     Pt was diag with a bulging disc sees a neurosurgeon on Tuesday  pt in a lot of pain        A 63-year-old male patient with history of diabetes, hypertension, GERD comes into the emergency department today with complaints of low back pain radiating to his left leg.  States started several weeks ago and has only been increasing in severity.  He describes that seen in the emergency department a couple weeks ago was evaluated discharged home.  Then was seen last Sunday when she was referred to orthopedics.   he went to the walk-in clinic Monday morning as instructed which was difficult for him secondary to his pain but they told him there is no one with spine there and there is no one to evaluate him.  They set him up to be seen Wednesday morning but did receive a phone call Wednesday morning stating that the spine doctor was not can begin again.   has been suffering with increasing pain to the point now where he is having difficulty even ambulating secondary to the pain.  States he can take a few steps but he has to lay back down to alleviate his pain.  Is the only position that the pain is somewhat more comfortable.  He rates pain a 9 out of 10 on the pain scale when he is laying down tends on the pain scale when he is trying to ambulate.  He denies any saddle anesthesia, loss bowel control, or any urinary retention.  States he has had a lumbar procedure performed about 10 years ago by Dr. De León for similar pain.  He is scheduled to see Dr. Lux in this coming Tuesday but feels like he would be unable to make it to this appointment secondary to his pain                          Evie Coma Scale Score: 15                     Patient History   Past Medical History:   Diagnosis Date    Cirrhosis (Multi)     Diabetes mellitus (Multi)     Hypertension     Other conditions influencing health status 11/03/2013    Ureterocele    Personal history  of other diseases of the digestive system     History of gastroesophageal reflux (GERD)    Personal history of other endocrine, nutritional and metabolic disease     History of hyperlipidemia     Past Surgical History:   Procedure Laterality Date    HERNIA REPAIR  07/30/2014    Hernia Repair    KNEE ARTHROSCOPY W/ DEBRIDEMENT  07/30/2014    Arthroscopy Knee Right    OTHER SURGICAL HISTORY  08/18/2014    Percutaneous Ablation Of Renal Tumor(S)    OTHER SURGICAL HISTORY  08/18/2014    Kidney Surgery Laparoscopic Partial Nephrectomy    TONSILLECTOMY  07/30/2014    Tonsillectomy     No family history on file.  Social History     Tobacco Use    Smoking status: Never     Passive exposure: Never    Smokeless tobacco: Never   Vaping Use    Vaping status: Never Used   Substance Use Topics    Alcohol use: Defer    Drug use: Never       Physical Exam   ED Triage Vitals [04/14/24 1123]   Temperature Heart Rate Respirations BP   36.5 °C (97.7 °F) 83 18 167/74      Pulse Ox Temp src Heart Rate Source Patient Position   96 % -- -- --      BP Location FiO2 (%)     -- --       Physical Exam  Constitutional:       General: He is in acute distress (Severe).      Appearance: Normal appearance. He is not ill-appearing or diaphoretic.   HENT:      Head: Normocephalic and atraumatic.      Nose: Nose normal.   Eyes:      Extraocular Movements: Extraocular movements intact.      Conjunctiva/sclera: Conjunctivae normal.   Cardiovascular:      Rate and Rhythm: Normal rate and regular rhythm.   Pulmonary:      Effort: Pulmonary effort is normal. No respiratory distress.      Breath sounds: Normal breath sounds. No stridor. No wheezing.   Musculoskeletal:         General: Tenderness (Tenderness palpation over the midline distal lumbar spine, left SI joint, straight leg raise positive) present. Normal range of motion.      Cervical back: Normal range of motion.   Skin:     General: Skin is warm and dry.   Neurological:      General: No focal  deficit present.      Mental Status: He is alert and oriented to person, place, and time. Mental status is at baseline.   Psychiatric:         Mood and Affect: Mood normal.         ED Course & MDM   Diagnoses as of 04/14/24 1646   Intractable back pain   Back pain of lumbosacral region with sciatica       Medical Decision Making  A 63-year-old male patient with history of diabetes, hypertension, GERD comes into the emergency department today with complaints of low back pain radiating to his left leg.  States started several weeks ago and has only been increasing in severity.  He describes that seen in the emergency department a couple weeks ago was evaluated discharged home.  Then was seen last Sunday when she was referred to orthopedics.  States he went to the walk-in clinic Monday morning as instructed which was difficult for him secondary to his pain but they told him there is no one with spine there and there is no one to evaluate him.  They set him up to be seen Wednesday morning but did receive a phone call Wednesday morning stating that the spine doctor was not can begin again.   has been suffering with increasing pain to the point now where he is having difficulty even ambulating secondary to the pain.  States he can take a few steps but he has to lay back down to alleviate his pain.  Is the only position that the pain is somewhat more comfortable.  He rates pain a 9 out of 10 on the pain scale when he is laying down tends on the pain scale when he is trying to ambulate.  He denies any saddle anesthesia, loss bowel control, or any urinary retention.  States he has had a lumbar procedure performed about 10 years ago by Dr. De León for similar pain.  He is scheduled to see Dr. Lux in this coming Tuesday but feels like he would be unable to make it to this appointment secondary to his pain    IV Dilaudid IV Toradol IV Solu-Medrol IV Zofran ordered for the patient.    Patient states that he had CT  performed last Sunday which I confirmed.  No need for new imaging today.  Patient does not require an emergent MRI as he does not have symptoms for cauda equina.  Despite pain medication patient's pain is still not well-controlled.  Patient does not feel comfortable going home as he states he can hardly even walk a few steps secondary to his pain.  Feels like he would not be able to make it to his neuro spine appointment on Tuesday.  Will admit patient to the hospital for intractable back pain.    Historian is the patient    Diagnosis: Intractable back pain, lumbosacral pain with sciatica    I discussed the case with the hospitalist Dr. Ness who is recommending that MRI of the L and T-spine be ordered performed in the ER and depending on results give him a call back.    Handoff to Celina Walker PA-C pending MRI studies and admission      Labs Reviewed   CBC WITH AUTO DIFFERENTIAL - Abnormal       Result Value    WBC 6.4      nRBC 0.0      RBC 4.07 (*)     Hemoglobin 12.5 (*)     Hematocrit 36.1 (*)     MCV 89      MCH 30.7      MCHC 34.6      RDW 13.0      Platelets 174      Neutrophils % 62.8      Immature Granulocytes %, Automated 0.5      Lymphocytes % 27.8      Monocytes % 4.1      Eosinophils % 4.2      Basophils % 0.6      Neutrophils Absolute 3.99      Immature Granulocytes Absolute, Automated 0.03      Lymphocytes Absolute 1.77      Monocytes Absolute 0.26      Eosinophils Absolute 0.27      Basophils Absolute 0.04     COMPREHENSIVE METABOLIC PANEL - Abnormal    Glucose 128 (*)     Sodium 135 (*)     Potassium 4.3      Chloride 100      Bicarbonate 25      Anion Gap 14      Urea Nitrogen 23      Creatinine 1.00      eGFR 86      Calcium 9.6      Albumin 4.3      Alkaline Phosphatase 36      Total Protein 8.3 (*)     AST 50 (*)     Bilirubin, Total 0.7      ALT 43     SEDIMENTATION RATE, AUTOMATED - Abnormal    Sedimentation Rate 28 (*)    C-REACTIVE PROTEIN - Normal    C-Reactive Protein 0.30          MR  lumbar spine w and wo IV contrast    (Results Pending)   MR thoracic spine w and wo IV contrast    (Results Pending)       Procedure  Procedures     Greg Arevalo PA-C  04/14/24 7581

## 2024-04-15 ENCOUNTER — APPOINTMENT (OUTPATIENT)
Dept: RADIOLOGY | Facility: HOSPITAL | Age: 64
DRG: 552 | End: 2024-04-15
Payer: MEDICARE

## 2024-04-15 ENCOUNTER — TELEPHONE (OUTPATIENT)
Dept: PAIN MANAGEMENT | Age: 64
End: 2024-04-15

## 2024-04-15 LAB
ANION GAP SERPL CALC-SCNC: 15 MMOL/L (ref 10–20)
BUN SERPL-MCNC: 31 MG/DL (ref 6–23)
CALCIUM SERPL-MCNC: 9.7 MG/DL (ref 8.6–10.3)
CHLORIDE SERPL-SCNC: 97 MMOL/L (ref 98–107)
CO2 SERPL-SCNC: 28 MMOL/L (ref 21–32)
CREAT SERPL-MCNC: 1.27 MG/DL (ref 0.5–1.3)
EGFRCR SERPLBLD CKD-EPI 2021: 64 ML/MIN/1.73M*2
ERYTHROCYTE [DISTWIDTH] IN BLOOD BY AUTOMATED COUNT: 13.2 % (ref 11.5–14.5)
GLUCOSE BLD MANUAL STRIP-MCNC: 251 MG/DL (ref 74–99)
GLUCOSE BLD MANUAL STRIP-MCNC: 262 MG/DL (ref 74–99)
GLUCOSE BLD MANUAL STRIP-MCNC: 294 MG/DL (ref 74–99)
GLUCOSE BLD MANUAL STRIP-MCNC: 298 MG/DL (ref 74–99)
GLUCOSE SERPL-MCNC: 299 MG/DL (ref 74–99)
HCT VFR BLD AUTO: 34.6 % (ref 41–52)
HGB BLD-MCNC: 11.7 G/DL (ref 13.5–17.5)
HOLD SPECIMEN: NORMAL
MCH RBC QN AUTO: 29.7 PG (ref 26–34)
MCHC RBC AUTO-ENTMCNC: 33.8 G/DL (ref 32–36)
MCV RBC AUTO: 88 FL (ref 80–100)
NRBC BLD-RTO: 0 /100 WBCS (ref 0–0)
PLATELET # BLD AUTO: 199 X10*3/UL (ref 150–450)
POTASSIUM SERPL-SCNC: 4.5 MMOL/L (ref 3.5–5.3)
RBC # BLD AUTO: 3.94 X10*6/UL (ref 4.5–5.9)
SODIUM SERPL-SCNC: 135 MMOL/L (ref 136–145)
WBC # BLD AUTO: 8 X10*3/UL (ref 4.4–11.3)

## 2024-04-15 PROCEDURE — 99222 1ST HOSP IP/OBS MODERATE 55: CPT | Performed by: ORTHOPAEDIC SURGERY

## 2024-04-15 PROCEDURE — 96372 THER/PROPH/DIAG INJ SC/IM: CPT | Performed by: STUDENT IN AN ORGANIZED HEALTH CARE EDUCATION/TRAINING PROGRAM

## 2024-04-15 PROCEDURE — 72158 MRI LUMBAR SPINE W/O & W/DYE: CPT

## 2024-04-15 PROCEDURE — 93971 EXTREMITY STUDY: CPT

## 2024-04-15 PROCEDURE — 2500000005 HC RX 250 GENERAL PHARMACY W/O HCPCS: Performed by: STUDENT IN AN ORGANIZED HEALTH CARE EDUCATION/TRAINING PROGRAM

## 2024-04-15 PROCEDURE — 82947 ASSAY GLUCOSE BLOOD QUANT: CPT

## 2024-04-15 PROCEDURE — 2550000001 HC RX 255 CONTRASTS: Performed by: STUDENT IN AN ORGANIZED HEALTH CARE EDUCATION/TRAINING PROGRAM

## 2024-04-15 PROCEDURE — 2500000002 HC RX 250 W HCPCS SELF ADMINISTERED DRUGS (ALT 637 FOR MEDICARE OP, ALT 636 FOR OP/ED): Performed by: STUDENT IN AN ORGANIZED HEALTH CARE EDUCATION/TRAINING PROGRAM

## 2024-04-15 PROCEDURE — 2500000002 HC RX 250 W HCPCS SELF ADMINISTERED DRUGS (ALT 637 FOR MEDICARE OP, ALT 636 FOR OP/ED): Performed by: REGISTERED NURSE

## 2024-04-15 PROCEDURE — 80048 BASIC METABOLIC PNL TOTAL CA: CPT | Performed by: STUDENT IN AN ORGANIZED HEALTH CARE EDUCATION/TRAINING PROGRAM

## 2024-04-15 PROCEDURE — A9575 INJ GADOTERATE MEGLUMI 0.1ML: HCPCS | Performed by: STUDENT IN AN ORGANIZED HEALTH CARE EDUCATION/TRAINING PROGRAM

## 2024-04-15 PROCEDURE — 85027 COMPLETE CBC AUTOMATED: CPT | Performed by: STUDENT IN AN ORGANIZED HEALTH CARE EDUCATION/TRAINING PROGRAM

## 2024-04-15 PROCEDURE — 2500000001 HC RX 250 WO HCPCS SELF ADMINISTERED DRUGS (ALT 637 FOR MEDICARE OP): Performed by: REGISTERED NURSE

## 2024-04-15 PROCEDURE — 99232 SBSQ HOSP IP/OBS MODERATE 35: CPT | Performed by: REGISTERED NURSE

## 2024-04-15 PROCEDURE — 93971 EXTREMITY STUDY: CPT | Performed by: RADIOLOGY

## 2024-04-15 PROCEDURE — 36415 COLL VENOUS BLD VENIPUNCTURE: CPT | Performed by: STUDENT IN AN ORGANIZED HEALTH CARE EDUCATION/TRAINING PROGRAM

## 2024-04-15 PROCEDURE — 2500000001 HC RX 250 WO HCPCS SELF ADMINISTERED DRUGS (ALT 637 FOR MEDICARE OP): Performed by: STUDENT IN AN ORGANIZED HEALTH CARE EDUCATION/TRAINING PROGRAM

## 2024-04-15 PROCEDURE — G0378 HOSPITAL OBSERVATION PER HR: HCPCS

## 2024-04-15 PROCEDURE — 2500000004 HC RX 250 GENERAL PHARMACY W/ HCPCS (ALT 636 FOR OP/ED): Performed by: STUDENT IN AN ORGANIZED HEALTH CARE EDUCATION/TRAINING PROGRAM

## 2024-04-15 RX ORDER — GABAPENTIN 300 MG/1
300 CAPSULE ORAL NIGHTLY
Status: DISCONTINUED | OUTPATIENT
Start: 2024-04-15 | End: 2024-04-18 | Stop reason: HOSPADM

## 2024-04-15 RX ORDER — GADOTERATE MEGLUMINE 376.9 MG/ML
0.2 INJECTION INTRAVENOUS
Status: COMPLETED | OUTPATIENT
Start: 2024-04-15 | End: 2024-04-15

## 2024-04-15 RX ORDER — INSULIN LISPRO 100 [IU]/ML
0-10 INJECTION, SOLUTION INTRAVENOUS; SUBCUTANEOUS
Status: DISCONTINUED | OUTPATIENT
Start: 2024-04-15 | End: 2024-04-18 | Stop reason: HOSPADM

## 2024-04-15 RX ORDER — CEPHALEXIN 500 MG/1
500 CAPSULE ORAL EVERY 6 HOURS SCHEDULED
Status: DISCONTINUED | OUTPATIENT
Start: 2024-04-15 | End: 2024-04-18 | Stop reason: HOSPADM

## 2024-04-15 RX ORDER — FENOFIBRATE 54 MG/1
108 TABLET ORAL NIGHTLY
Status: DISCONTINUED | OUTPATIENT
Start: 2024-04-15 | End: 2024-04-18 | Stop reason: HOSPADM

## 2024-04-15 RX ORDER — LORAZEPAM 2 MG/ML
2 INJECTION INTRAMUSCULAR ONCE
Status: COMPLETED | OUTPATIENT
Start: 2024-04-15 | End: 2024-04-15

## 2024-04-15 RX ADMIN — PANTOPRAZOLE SODIUM 40 MG: 40 TABLET, DELAYED RELEASE ORAL at 05:19

## 2024-04-15 RX ADMIN — CEPHALEXIN 500 MG: 500 CAPSULE ORAL at 20:16

## 2024-04-15 RX ADMIN — INSULIN LISPRO 6 UNITS: 100 INJECTION, SOLUTION INTRAVENOUS; SUBCUTANEOUS at 12:30

## 2024-04-15 RX ADMIN — HYDROMORPHONE HYDROCHLORIDE 0.6 MG: 1 INJECTION, SOLUTION INTRAMUSCULAR; INTRAVENOUS; SUBCUTANEOUS at 05:26

## 2024-04-15 RX ADMIN — CARVEDILOL 6.25 MG: 6.25 TABLET, FILM COATED ORAL at 08:34

## 2024-04-15 RX ADMIN — LOSARTAN POTASSIUM 100 MG: 100 TABLET, FILM COATED ORAL at 08:34

## 2024-04-15 RX ADMIN — CARVEDILOL 6.25 MG: 6.25 TABLET, FILM COATED ORAL at 16:13

## 2024-04-15 RX ADMIN — HYDROMORPHONE HYDROCHLORIDE 0.6 MG: 1 INJECTION, SOLUTION INTRAMUSCULAR; INTRAVENOUS; SUBCUTANEOUS at 08:34

## 2024-04-15 RX ADMIN — GABAPENTIN 300 MG: 300 CAPSULE ORAL at 20:17

## 2024-04-15 RX ADMIN — ACETAMINOPHEN 650 MG: 325 TABLET ORAL at 16:17

## 2024-04-15 RX ADMIN — ACETAMINOPHEN 650 MG: 325 TABLET ORAL at 05:20

## 2024-04-15 RX ADMIN — LIDOCAINE 4% 2 PATCH: 40 PATCH TOPICAL at 08:33

## 2024-04-15 RX ADMIN — CEPHALEXIN 500 MG: 500 CAPSULE ORAL at 15:21

## 2024-04-15 RX ADMIN — FENOFIBRATE 108 MG: 54 TABLET ORAL at 20:17

## 2024-04-15 RX ADMIN — AMLODIPINE BESYLATE 10 MG: 5 TABLET ORAL at 08:34

## 2024-04-15 RX ADMIN — HYDROMORPHONE HYDROCHLORIDE 0.6 MG: 1 INJECTION, SOLUTION INTRAMUSCULAR; INTRAVENOUS; SUBCUTANEOUS at 16:43

## 2024-04-15 RX ADMIN — ENOXAPARIN SODIUM 40 MG: 40 INJECTION SUBCUTANEOUS at 08:34

## 2024-04-15 RX ADMIN — INSULIN LISPRO 26 UNITS: 100 INJECTION, SOLUTION INTRAVENOUS; SUBCUTANEOUS at 08:32

## 2024-04-15 RX ADMIN — INSULIN GLARGINE 68 UNITS: 100 INJECTION, SOLUTION SUBCUTANEOUS at 12:27

## 2024-04-15 RX ADMIN — LORAZEPAM 2 MG: 2 INJECTION INTRAMUSCULAR; INTRAVENOUS at 17:34

## 2024-04-15 RX ADMIN — ATORVASTATIN CALCIUM 20 MG: 20 TABLET, FILM COATED ORAL at 20:17

## 2024-04-15 RX ADMIN — GADOTERATE MEGLUMINE 25 ML: 376.9 INJECTION INTRAVENOUS at 18:58

## 2024-04-15 RX ADMIN — HYDROMORPHONE HYDROCHLORIDE 0.6 MG: 1 INJECTION, SOLUTION INTRAMUSCULAR; INTRAVENOUS; SUBCUTANEOUS at 12:33

## 2024-04-15 RX ADMIN — ASPIRIN 81 MG: 81 TABLET, CHEWABLE ORAL at 08:34

## 2024-04-15 RX ADMIN — INSULIN GLARGINE 68 UNITS: 100 INJECTION, SOLUTION SUBCUTANEOUS at 23:04

## 2024-04-15 RX ADMIN — ACETAMINOPHEN 650 MG: 325 TABLET ORAL at 00:09

## 2024-04-15 RX ADMIN — INSULIN LISPRO 26 UNITS: 100 INJECTION, SOLUTION INTRAVENOUS; SUBCUTANEOUS at 16:16

## 2024-04-15 RX ADMIN — ENOXAPARIN SODIUM 40 MG: 40 INJECTION SUBCUTANEOUS at 20:16

## 2024-04-15 RX ADMIN — ACETAMINOPHEN 650 MG: 325 TABLET ORAL at 23:04

## 2024-04-15 RX ADMIN — INSULIN LISPRO 26 UNITS: 100 INJECTION, SOLUTION INTRAVENOUS; SUBCUTANEOUS at 12:32

## 2024-04-15 RX ADMIN — ACETAMINOPHEN 650 MG: 325 TABLET ORAL at 12:33

## 2024-04-15 RX ADMIN — HYDROMORPHONE HYDROCHLORIDE 0.6 MG: 1 INJECTION, SOLUTION INTRAMUSCULAR; INTRAVENOUS; SUBCUTANEOUS at 23:39

## 2024-04-15 RX ADMIN — INSULIN LISPRO 6 UNITS: 100 INJECTION, SOLUTION INTRAVENOUS; SUBCUTANEOUS at 16:13

## 2024-04-15 RX ADMIN — HYDROMORPHONE HYDROCHLORIDE 0.6 MG: 1 INJECTION, SOLUTION INTRAMUSCULAR; INTRAVENOUS; SUBCUTANEOUS at 20:16

## 2024-04-15 ASSESSMENT — PAIN SCALES - GENERAL
PAINLEVEL_OUTOF10: 8
PAINLEVEL_OUTOF10: 3
PAINLEVEL_OUTOF10: 8
PAINLEVEL_OUTOF10: 8
PAINLEVEL_OUTOF10: 7
PAINLEVEL_OUTOF10: 6
PAINLEVEL_OUTOF10: 6
PAINLEVEL_OUTOF10: 8
PAINLEVEL_OUTOF10: 9
PAINLEVEL_OUTOF10: 5 - MODERATE PAIN
PAINLEVEL_OUTOF10: 7

## 2024-04-15 ASSESSMENT — ACTIVITIES OF DAILY LIVING (ADL)
EFFECT OF PAIN ON DAILY ACTIVITIES: COMFORT

## 2024-04-15 ASSESSMENT — PAIN - FUNCTIONAL ASSESSMENT
PAIN_FUNCTIONAL_ASSESSMENT: 0-10

## 2024-04-15 ASSESSMENT — PAIN DESCRIPTION - LOCATION
LOCATION: KNEE
LOCATION: BACK
LOCATION: KNEE

## 2024-04-15 ASSESSMENT — ENCOUNTER SYMPTOMS
NAUSEA: 0
HEMATURIA: 0
CHEST TIGHTNESS: 0
ABDOMINAL PAIN: 0
WEAKNESS: 0
NECK PAIN: 0
VOMITING: 0
NUMBNESS: 0
COUGH: 0
COLOR CHANGE: 0
DIZZINESS: 0
FEVER: 0
DYSURIA: 0
SHORTNESS OF BREATH: 0
BACK PAIN: 1
SORE THROAT: 0
CHILLS: 0
DIARRHEA: 0

## 2024-04-15 ASSESSMENT — PAIN DESCRIPTION - DESCRIPTORS
DESCRIPTORS: ACHING

## 2024-04-15 NOTE — CARE PLAN
Problem: Pain  Goal: My pain/discomfort is manageable  Outcome: Progressing     Problem: Safety  Goal: Patient will be injury free during hospitalization  Outcome: Progressing  Goal: I will remain free of falls  Outcome: Progressing     Problem: Daily Care  Goal: Daily care needs are met  Outcome: Progressing     Problem: Psychosocial Needs  Goal: Demonstrates ability to cope with hospitalization/illness  Outcome: Progressing  Goal: Collaborate with me, my family, and caregiver to identify my specific goals  Outcome: Progressing     Problem: Discharge Barriers  Goal: My discharge needs are met  Outcome: Progressing   The patient's goals for the shift include      The clinical goals for the shift include pain <3

## 2024-04-15 NOTE — H&P
Medical Group History and Physical      ASSESSMENT & PLAN:     #.  Intractable back pain with sciatica  #.  Degenerative osteoarthritis of spine  -Thoracic MRI showing multilevel degenerative changes without definite spinal cord signal abnormality  -Lumbar MRI in the morning  -Pain control: Scheduled Tylenol, lidocaine patch, Dilaudid as needed for breakthrough  -Orthopedic surgery consult    #.  Type 2 diabetes  -Poorly controlled, A1c 9.9%  -Outpatient regimen: Toujeo 100 units twice daily, Humalog 35 units with meals  -Inpatient regimen: Lantus 68 units twice daily, Humalog 26 units with meals  -Carb controlled diet  -Hypoglycemic protocol    #.  Hypertension  #.  HLD  -Resume home medications    #.  DIAZ cirrhosis  -Stable, no signs of decompensation, no ascites    VTE PPX: Lovenox      Tyrone Pang MD    --Of note, this documentation is completed using the Dragon Dictation system (voice recognition software). There may be spelling and/or grammatical errors that were not corrected prior to final submission.--    HISTORY OF PRESENT ILLNESS:   Chief Complaint: Back pain    Gary Sharma is a 63 y.o. male with a history of poorly controlled type 2 diabetes, hypertension, degenerative osteoarthritis, hyperlipidemia, DIAZ cirrhosis, renal carcinoma presenting with intractable back pain.  Patient has been following with orthopedic surgery and pain management as outpatient.  He has an appointment next week however the pain was too significant for him to wait.  He states he has 10 out of 10 back pain with radiation down his right leg.  He states that is making it difficult to walk.  He denies any sensory loss aside from numbness in his feet that is at baseline from his diabetes.  He denies any urinary retention.  Denies fever or chills.       ROS  10 point review of systems negative except per HPI     PAST HISTORIES:     Past Medical History  See HPI    Surgical History  He has a past surgical history that  includes Hernia repair (07/30/2014); Tonsillectomy (07/30/2014); Knee arthroscopy w/ debridement (07/30/2014); Other surgical history (08/18/2014); and Other surgical history (08/18/2014).     Social History  He reports that he has never smoked. He has never been exposed to tobacco smoke. He has never used smokeless tobacco. Alcohol use questions deferred to the physician. He reports that he does not use drugs.    Family History  No family history on file.    Allergies:  Patient has no known allergies.      OBJECTIVE:      Last Recorded Vitals  /60   Pulse 110   Temp 36.4 °C (97.5 °F)   Resp 18   Wt 127 kg (280 lb)   SpO2 93%     Last I/O:  No intake/output data recorded.    Physical Exam   Gen: Appears uncomfortable, appears stated age  HEENT: EOM, MMM  CV: RRR, no murmurs rubs or gallops  Resp: Clear to auscultation bilaterally, normal effort  Abdomen: soft, NT,+BS  LE: No edema, no deformity  Neuro: A&Ox4, moving all extremities    LABS AND IMAGING:       Relevant Results  Labs Reviewed   CBC WITH AUTO DIFFERENTIAL - Abnormal       Result Value    WBC 6.4      nRBC 0.0      RBC 4.07 (*)     Hemoglobin 12.5 (*)     Hematocrit 36.1 (*)     MCV 89      MCH 30.7      MCHC 34.6      RDW 13.0      Platelets 174      Neutrophils % 62.8      Immature Granulocytes %, Automated 0.5      Lymphocytes % 27.8      Monocytes % 4.1      Eosinophils % 4.2      Basophils % 0.6      Neutrophils Absolute 3.99      Immature Granulocytes Absolute, Automated 0.03      Lymphocytes Absolute 1.77      Monocytes Absolute 0.26      Eosinophils Absolute 0.27      Basophils Absolute 0.04     COMPREHENSIVE METABOLIC PANEL - Abnormal    Glucose 128 (*)     Sodium 135 (*)     Potassium 4.3      Chloride 100      Bicarbonate 25      Anion Gap 14      Urea Nitrogen 23      Creatinine 1.00      eGFR 86      Calcium 9.6      Albumin 4.3      Alkaline Phosphatase 36      Total Protein 8.3 (*)     AST 50 (*)     Bilirubin, Total 0.7       ALT 43     SEDIMENTATION RATE, AUTOMATED - Abnormal    Sedimentation Rate 28 (*)    POCT GLUCOSE - Abnormal    POCT Glucose 374 (*)    C-REACTIVE PROTEIN - Normal    C-Reactive Protein 0.30     CBC   BASIC METABOLIC PANEL     MR thoracic spine wo IV contrast   Final Result   Multilevel degenerative changes of the thoracic spine as above   described worst at T7-8 with disc osteophyte complex flattens the   left ventral spinal cord without definite spinal cord signal   abnormality and additional findings as above. Please correlate   clinically.        Signed by: Navjot Wyatt 4/14/2024 5:37 PM   Dictation workstation:   [id number]      MR lumbar spine wo IV contrast    (Results Pending)

## 2024-04-15 NOTE — CARE PLAN
The patient's goals for the shift include      The clinical goals for the shift include pain <3    Problem: Psychosocial Needs  Goal: Collaborate with me, my family, and caregiver to identify my specific goals  Recent Flowsheet Documentation  Taken 4/14/2024 2226 by Chiquis Grant RN  Cultural Requests During Hospitalization: none  Spiritual Requests During Hospitalization: none

## 2024-04-15 NOTE — PROGRESS NOTES
Physical Therapy                 Therapy Communication Note    Patient Name: Gary Sharma  MRN: [id number]  Today's Date: 4/15/2024     Discipline: Physical Therapy    Missed Visit Reason: 1306-Missed Visit Reason: Other (Comment) (PT /OT attempt . pt with pending Lumbar MRI and L UE US to R/o DVT.  re-attempt as able)    Missed Time: Attempt    Comment:

## 2024-04-15 NOTE — PROGRESS NOTES
04/15/24 1457   Discharge Planning   Living Arrangements Spouse/significant other   Assistance Needed TBD   Type of Residence Private residence   Number of Stairs to Enter Residence 2   Do you have animals or pets at home? No   Who is requesting discharge planning? Provider   Patient expects to be discharged to: TBD   Does the patient need discharge transport arranged? No     Patient is from home, lives with spouse, will wait for results of procedures to discuss further discharge plans. Will continue to follow.

## 2024-04-15 NOTE — PROGRESS NOTES
Gary Sharma is a 63 y.o. male on day 0 of admission presenting with Intractable back pain.      Subjective   Patient examined and seen. Alert and oriented x3, sitting in chair with pain 2/10.  Patient denies chest pain, shortness of breath, palpitations, abdominal pain, fever or chills.  States that prior 2 weeks he has been in excruciating pain when he ambulates with pain radiating down his right leg with ambulation. At rest, he has minimal low lumbar pain.     He had surgery 10 years ago for a bulging disc after he suffered a back injury, currently disabled. He is using a cane due to his increased pain when ambulating. He currently sees Pain Management  Prior to increased pain he was independent with all ADLs mets > 4. He denies chest pain, shortness of breath, blurry vision, headache, loss of bladder or bowel. He was seen by Orthopedics this AM. Pending further images at this time.     He did mention that this left wrist to forearm is red. He stated no IV, injury, trauma, pain or tenderness is noted to the area. He stated he woke up with the redness. With  palpation, no tenderness is note, no significant swelling, there is some erythema, no streaking. Full range of motion of wrist, hand and fingers. No swelling, erythema or swelling noted. WBC is WNL, no fever or chills. Wife at bedside and stated she noticed the redness yesterday while in the ER. We will obtain U/S of the area to rule out superficial DVT as patient as been more sedentary with increased pain from lumbar spine and sciatica.     Pain Management recommendations are pending.        Objective     Last Recorded Vitals  /71 (BP Location: Left arm, Patient Position: Lying)   Pulse 99   Temp 36.9 °C (98.4 °F) (Temporal)   Resp 18   Wt 127 kg (280 lb)   SpO2 96%   Intake/Output last 3 Shifts:    Intake/Output Summary (Last 24 hours) at 4/15/2024 1022  Last data filed at 4/15/2024 0822  Gross per 24 hour   Intake 750 ml   Output 400 ml   Net  350 ml       Admission Weight  Weight: 127 kg (280 lb) (04/14/24 1123)    Daily Weight  04/14/24 : 127 kg (280 lb)    Image Results  MR thoracic spine wo IV contrast  Narrative: Interpreted By:  Navjot Wyatt,   STUDY:  MR THORACIC SPINE WO IV CONTRAST;  4/14/2024 5:20 pm      INDICATION:  Signs/Symptoms:Lumbar pain radiates to left leg difficulty with  walking.      COMPARISON:  None.      ACCESSION NUMBER(S):  [id number]      ORDERING CLINICIAN:  MILLIE WASSERMAN      TECHNIQUE:  Sagittal T1, T2, STIR and axial T2 and T1 weighted MR images of the  thoracic spine were obtained.      FINDINGS:  Vertebrae: The height, alignment, and signal of the thoracic  vertebral bodies are preserved.      Spinal cord: No spinal cord signal abnormality was identified.      T1-2: There is no significant central canal stenosis.  T2-3:  There is no significant central canal stenosis.  T3-4: There is no significant central canal stenosis.  T4-5: There is no significant central canal stenosis.  T5-6: Minimal to moderate central disc osteophyte complex indents the  ventral thecal sac narrowing of the central canal to approximately 7  mm in the anterior-posterior plane apparently abutting and perhaps  minimally flattening the ventral spinal cord. Neuroforamina are  patent. T6-7: There is no significant central canal stenosis.  T7-8: Moderate left paracentral disc osteophyte complex flattens the  left ventral spinal cord without definite spinal cord signal  abnormality identified. The neuroforamina are not significantly  narrowed. Central canal measures approximately 8 mm in the  anterior-plane at the sagittal midline. T8-9: Small central  herniation minimally indents the ventral thecal sac extending toward  without clearly deforming the ventral spinal cord. The neuroforamina  are patent. T9-10: Small left paracentral disc osteophyte complex  extending 5 mm rostral to the intervertebral disc space indents the  ventral thecal sac extending  towards and perhaps minimally flattening  the left ventral spinal cord. T10-11: No significant central canal  stenosis. Moderate bilateral foraminal stenosis due to hypertrophic  facet changes. T11-12: There is no significant central canal stenosis.  T12-L1: Moderate central herniation indents the ventral thecal sac.  Neuroforamina are patent.      Impression: Multilevel degenerative changes of the thoracic spine as above  described worst at T7-8 with disc osteophyte complex flattens the  left ventral spinal cord without definite spinal cord signal  abnormality and additional findings as above. Please correlate  clinically.      Signed by: Navjot Wyatt 4/14/2024 5:37 PM  Dictation workstation:   [id number]      Physical Exam  Constitutional: Well developed, awake/alert/oriented x3, , cooperative  Respiratory/Thorax: Patent airways,  normal breath sounds   Cardiovascular: Regular, rate and rhythm,  2+ equal pulses of the extremities, normal S 1and S 2  Musculoskeletal: ROM intact, no joint swelling,   Extremities: normal extremities,  no contusions or wounds seen , sensation intact , stength 5/5, denies saddle paresthesia   Skin: warm, dry, intact,. Mild erythema to left wrist to forearm without tenderness, streaking or edema, denies trauma, IV, tenderness to area   Neurological: alert/oriented x 3, speech clear, Psychiatric: appropriate mood and behavior    Relevant Results  Scheduled medications  acetaminophen, 650 mg, oral, q6h  amLODIPine, 10 mg, oral, Daily  aspirin, 81 mg, oral, Daily  atorvastatin, 20 mg, oral, Nightly  carvedilol, 6.25 mg, oral, BID with meals  enoxaparin, 40 mg, subcutaneous, q12h ALIDA  fenofibrate, 108 mg, oral, Nightly  gabapentin, 300 mg, oral, Nightly  HYDROmorphone, 0.4 mg, intravenous, Once  insulin glargine, 68 Units, subcutaneous, q12h  insulin lispro, 26 Units, subcutaneous, TID with meals  lidocaine, 2 patch, transdermal, Daily  LORazepam, 2 mg, intravenous, Once  losartan, 100 mg,  oral, Daily  pantoprazole, 40 mg, oral, Daily before breakfast  polyethylene glycol, 17 g, oral, Daily      Continuous medications     PRN medications  PRN medications: acetaminophen **OR** acetaminophen **OR** acetaminophen, dextrose, dextrose, glucagon, glucagon, HYDROmorphone, HYDROmorphone, HYDROmorphone, melatonin, ondansetron    Results for orders placed or performed during the hospital encounter of 04/14/24 (from the past 24 hour(s))   CBC and Auto Differential   Result Value Ref Range    WBC 6.4 4.4 - 11.3 x10*3/uL    nRBC 0.0 0.0 - 0.0 /100 WBCs    RBC 4.07 (L) 4.50 - 5.90 x10*6/uL    Hemoglobin 12.5 (L) 13.5 - 17.5 g/dL    Hematocrit 36.1 (L) 41.0 - 52.0 %    MCV 89 80 - 100 fL    MCH 30.7 26.0 - 34.0 pg    MCHC 34.6 32.0 - 36.0 g/dL    RDW 13.0 11.5 - 14.5 %    Platelets 174 150 - 450 x10*3/uL    Neutrophils % 62.8 40.0 - 80.0 %    Immature Granulocytes %, Automated 0.5 0.0 - 0.9 %    Lymphocytes % 27.8 13.0 - 44.0 %    Monocytes % 4.1 2.0 - 10.0 %    Eosinophils % 4.2 0.0 - 6.0 %    Basophils % 0.6 0.0 - 2.0 %    Neutrophils Absolute 3.99 1.20 - 7.70 x10*3/uL    Immature Granulocytes Absolute, Automated 0.03 0.00 - 0.70 x10*3/uL    Lymphocytes Absolute 1.77 1.20 - 4.80 x10*3/uL    Monocytes Absolute 0.26 0.10 - 1.00 x10*3/uL    Eosinophils Absolute 0.27 0.00 - 0.70 x10*3/uL    Basophils Absolute 0.04 0.00 - 0.10 x10*3/uL   Comprehensive metabolic panel   Result Value Ref Range    Glucose 128 (H) 74 - 99 mg/dL    Sodium 135 (L) 136 - 145 mmol/L    Potassium 4.3 3.5 - 5.3 mmol/L    Chloride 100 98 - 107 mmol/L    Bicarbonate 25 21 - 32 mmol/L    Anion Gap 14 10 - 20 mmol/L    Urea Nitrogen 23 6 - 23 mg/dL    Creatinine 1.00 0.50 - 1.30 mg/dL    eGFR 86 >60 mL/min/1.73m*2    Calcium 9.6 8.6 - 10.3 mg/dL    Albumin 4.3 3.4 - 5.0 g/dL    Alkaline Phosphatase 36 33 - 136 U/L    Total Protein 8.3 (H) 6.4 - 8.2 g/dL    AST 50 (H) 9 - 39 U/L    Bilirubin, Total 0.7 0.0 - 1.2 mg/dL    ALT 43 10 - 52 U/L    C-Reactive Protein   Result Value Ref Range    C-Reactive Protein 0.30 <1.00 mg/dL   Sedimentation Rate   Result Value Ref Range    Sedimentation Rate 28 (H) 0 - 20 mm/h   POCT GLUCOSE   Result Value Ref Range    POCT Glucose 374 (H) 74 - 99 mg/dL   SST TOP   Result Value Ref Range    Extra Tube Hold for add-ons.    CBC   Result Value Ref Range    WBC 8.0 4.4 - 11.3 x10*3/uL    nRBC 0.0 0.0 - 0.0 /100 WBCs    RBC 3.94 (L) 4.50 - 5.90 x10*6/uL    Hemoglobin 11.7 (L) 13.5 - 17.5 g/dL    Hematocrit 34.6 (L) 41.0 - 52.0 %    MCV 88 80 - 100 fL    MCH 29.7 26.0 - 34.0 pg    MCHC 33.8 32.0 - 36.0 g/dL    RDW 13.2 11.5 - 14.5 %    Platelets 199 150 - 450 x10*3/uL   Basic metabolic panel   Result Value Ref Range    Glucose 299 (H) 74 - 99 mg/dL    Sodium 135 (L) 136 - 145 mmol/L    Potassium 4.5 3.5 - 5.3 mmol/L    Chloride 97 (L) 98 - 107 mmol/L    Bicarbonate 28 21 - 32 mmol/L    Anion Gap 15 10 - 20 mmol/L    Urea Nitrogen 31 (H) 6 - 23 mg/dL    Creatinine 1.27 0.50 - 1.30 mg/dL    eGFR 64 >60 mL/min/1.73m*2    Calcium 9.7 8.6 - 10.3 mg/dL   POCT GLUCOSE   Result Value Ref Range    POCT Glucose 298 (H) 74 - 99 mg/dL   POCT GLUCOSE   Result Value Ref Range    POCT Glucose 294 (H) 74 - 99 mg/dL                 Assessment/Plan      #.  Intractable back pain with sciatica  #.  Degenerative osteoarthritis of spine    Medicine Admitting   Orthopedics Consulted- appreciate recommendations   Lumbar MRI pending evaluation and treatment plan   Pain  Control - Tylenol, lidocaine patch, diluadid as needed  PT/OT evaluation   Dvtp: Lovenox   Pain Management Following  MRI pending     # Left Forearm redness  No edema, no tenderness.   Denies injury, IV site, trauma, tenderness with movement or palpation  Will get U/S of upper Extremity to r/o DVT  Possible acute cellulitis? - will discuss with attending further management options   Elevate, dank area and monitor     # Diabetes Mellitus Type 2  Continue home medications  Diet  Cardiac/Diabetic  A1C  9.9  Encourage healthy lifestyle changes  -Outpatient regimen: Toujeo 100 units twice daily, Humalog 35 units with meals  -Inpatient regimen: Lantus 68 units twice daily, Humalog 26 units with meals  Add Sliding scale  Moderate AC/HS  May need to increase Lantus while inpatient   Resume Amaryl if no sx is needed    # HTN / HLD  Continue statin   Continue Coreg, Norvasc  Hold Diuretic at this time   Continue Losartan   Continue ASA discuss with Ortho- Ortho  reports no indication for sx, continue ASA.  Denies significant cardiac history   No indication for telemetry at this time     # DIAZ / Cirrohosis  Stable no ascites    # Obesity  Encourage healthy lifestyle changes  BMI  40      FULL CODE  Dvtp: Lovenox  Diabetic Diet  MRI Pending Results -  Per Ortho - no surgery at this time     Time spent  36  minutes obtaining labs, imaging, recommendations, interview, assessment, examination, medication review/ordering, and EMR review.    Plan of care was discussed extensively with patient, RN, Ortho NP, and wife. Patient verbalized understanding through teach back method. All questions and concerns addressed upon examination.  Discussed findings above with attending     Of note, this documentation is completed using the Dragon Dictation system (voice recognition software). There may be spelling and/or grammatical errors that were not corrected prior to final submission.

## 2024-04-15 NOTE — CONSULTS
Inpatient consult to Orthopaedic Surgery  Consult performed by: SONY Gonzalez-CNP  Consult ordered by: Tyrone Pang MD  Reason for consult: back pain        Consult Note  Patient: Gary Sharma  Unit/Bed: 1012/1012-B  YOB: 1960  MRN: [id number]  Acct: [id number]   Admitting Diagnosis: Intractable back pain [M54.9]  Back pain of lumbosacral region with sciatica [M54.40]  Date:  4/14/2024  Hospital Day: 0    Complaint:  Back pain    History of Present Illness:  Gary Sharma is a 63 year old male with a history of HTN, HLD, poorly controlled DM2, degenerative OA, DIAZ cirrhosis, and renal carcinoma who presented with intractable back pain. Patient reports left lower back pain that radiates to his left leg. States the pain has progressively gotten worse over the past 2 weeks in which he is having difficulty with ambulating. Reports he is able to take a few steps but then will have to sit back down due to the pain. He states he has had a lumbar spine procedure about 10 years ago with Dr. De León for similar pain issues in which they got better and he feels have now come back. Patient is seeing pain management doctor. He denies any saddle anesthesia's, loss of bowel or bladder control or urinary retention. He denies injury.      PMHx:  Past Medical History:   Diagnosis Date    Cirrhosis (Multi)     Diabetes mellitus (Multi)     Hypertension     Other conditions influencing health status 11/03/2013    Ureterocele    Personal history of other diseases of the digestive system     History of gastroesophageal reflux (GERD)    Personal history of other endocrine, nutritional and metabolic disease     History of hyperlipidemia       PSHx:  Past Surgical History:   Procedure Laterality Date    HERNIA REPAIR  07/30/2014    Hernia Repair    KNEE ARTHROSCOPY W/ DEBRIDEMENT  07/30/2014    Arthroscopy Knee Right    OTHER SURGICAL HISTORY  08/18/2014    Percutaneous Ablation Of Renal Tumor(S)    OTHER  SURGICAL HISTORY  08/18/2014    Kidney Surgery Laparoscopic Partial Nephrectomy    TONSILLECTOMY  07/30/2014    Tonsillectomy       Social Hx:  Social History     Socioeconomic History    Marital status:      Spouse name: None    Number of children: None    Years of education: None    Highest education level: None   Occupational History    None   Tobacco Use    Smoking status: Never     Passive exposure: Never    Smokeless tobacco: Never   Vaping Use    Vaping status: Never Used   Substance and Sexual Activity    Alcohol use: Defer    Drug use: Never    Sexual activity: Defer   Other Topics Concern    None   Social History Narrative    None     Social Determinants of Health     Financial Resource Strain: Patient Declined (4/14/2024)    Overall Financial Resource Strain (CARDIA)     Difficulty of Paying Living Expenses: Patient declined   Food Insecurity: No Food Insecurity (3/18/2024)    Received from [facility]    Hunger Vital Sign     Worried About Running Out of Food in the Last Year: Never true     Ran Out of Food in the Last Year: Never true   Transportation Needs: Patient Declined (4/14/2024)    PRAPARE - Transportation     Lack of Transportation (Medical): Patient declined     Lack of Transportation (Non-Medical): Patient declined   Physical Activity: Inactive (3/18/2024)    Received from [facility]    Exercise Vital Sign     Days of Exercise per Week: 0 days     Minutes of Exercise per Session: 0 min   Stress: No Stress Concern Present (3/18/2024)    Received from [facility]    [employer] - Occupational Stress Questionnaire     Feeling of Stress : Only a little   Social Connections: Moderately Isolated (3/18/2024)    Received from [facility]    Social Connection and Isolation Panel [NHANES]     Frequency of Communication with Friends and Family: More than three times a week     Frequency of Social Gatherings with Friends and Family: Once a week      Attends Buddhist Services: Never     Active Member of Clubs or Organizations: No     Attends Club or Organization Meetings: Never     Marital Status:    Intimate Partner Violence: Not on file   Housing Stability: Patient Declined (4/14/2024)    Housing Stability Vital Sign     Unable to Pay for Housing in the Last Year: Patient declined     Number of Places Lived in the Last Year: 1     Unstable Housing in the Last Year: Patient declined       Family Hx:  No family history on file.    Review of Systems:   Review of Systems   Constitutional:  Negative for chills and fever.   HENT:  Negative for congestion and sore throat.    Respiratory:  Negative for cough, chest tightness and shortness of breath.    Cardiovascular:  Negative for chest pain and leg swelling.   Gastrointestinal:  Negative for abdominal pain, diarrhea, nausea and vomiting.   Genitourinary:  Negative for dysuria and hematuria.   Musculoskeletal:  Positive for back pain (left lower back that radiates down buttock to anterior thigh) and gait problem. Negative for neck pain.   Skin:  Negative for color change.   Neurological:  Negative for dizziness, weakness and numbness.         Physical Examination:    Visit Vitals  /76 (BP Location: Left arm, Patient Position: Lying)   Pulse 97   Temp 36.1 °C (97 °F) (Temporal)   Resp 18      Physical Exam  Constitutional:       Appearance: Normal appearance. He is obese. He is not ill-appearing.   Pulmonary:      Effort: Pulmonary effort is normal.   Abdominal:      General: Bowel sounds are normal.   Musculoskeletal:         General: Normal range of motion.      Cervical back: Normal range of motion and neck supple.      Comments: 5/5 strength in all extremities. Sensation intact.   Skin:     General: Skin is warm and dry.   Neurological:      Mental Status: He is alert and oriented to person, place, and time.   Psychiatric:         Mood and Affect: Mood normal.         Behavior: Behavior normal.  "        LABS:  CBC:   Lab Results   Component Value Date    WBC 8.0 04/15/2024    RBC 3.94 (L) 04/15/2024    HGB 11.7 (L) 04/15/2024    HCT 34.6 (L) 04/15/2024    MCV 88 04/15/2024    MCH 29.7 04/15/2024    MCHC 33.8 04/15/2024    RDW 13.2 04/15/2024     04/15/2024     CBC with Differential:    Lab Results   Component Value Date    WBC 8.0 04/15/2024    RBC 3.94 (L) 04/15/2024    HGB 11.7 (L) 04/15/2024    HCT 34.6 (L) 04/15/2024     04/15/2024    MCV 88 04/15/2024    MCH 29.7 04/15/2024    MCHC 33.8 04/15/2024    RDW 13.2 04/15/2024    NRBC 0.0 04/15/2024    LYMPHOPCT 27.8 04/14/2024    MONOPCT 4.1 04/14/2024    EOSPCT 4.2 04/14/2024    BASOPCT 0.6 04/14/2024    MONOSABS 0.26 04/14/2024    LYMPHSABS 1.77 04/14/2024    EOSABS 0.27 04/14/2024    BASOSABS 0.04 04/14/2024     CMP:    Lab Results   Component Value Date     (L) 04/15/2024    K 4.5 04/15/2024    CL 97 (L) 04/15/2024    CO2 28 04/15/2024    BUN 31 (H) 04/15/2024    CREATININE 1.27 04/15/2024    GLUCOSE 299 (H) 04/15/2024    PROT 8.3 (H) 04/14/2024    CALCIUM 9.7 04/15/2024    BILITOT 0.7 04/14/2024    ALKPHOS 36 04/14/2024    AST 50 (H) 04/14/2024    ALT 43 04/14/2024     BMP:    Lab Results   Component Value Date     (L) 04/15/2024    K 4.5 04/15/2024    CL 97 (L) 04/15/2024    CO2 28 04/15/2024    BUN 31 (H) 04/15/2024    CREATININE 1.27 04/15/2024    CALCIUM 9.7 04/15/2024    GLUCOSE 299 (H) 04/15/2024     Magnesium:No results found for: \"MG\"  Troponin:  No results found for: \"TROPONINI\"    Imaging:  MR thoracic spine wo IV contrast    Result Date: 4/14/2024  Interpreted By:  Navjot Wyatt, STUDY: MR THORACIC SPINE WO IV CONTRAST;  4/14/2024 5:20 pm   INDICATION: Signs/Symptoms:Lumbar pain radiates to left leg difficulty with walking.   COMPARISON: None.   ACCESSION NUMBER(S): [id number]   ORDERING CLINICIAN: MILLIE WASSERMAN   TECHNIQUE: Sagittal T1, T2, STIR and axial T2 and T1 weighted MR images of the thoracic spine were " obtained.   FINDINGS: Vertebrae: The height, alignment, and signal of the thoracic vertebral bodies are preserved.   Spinal cord: No spinal cord signal abnormality was identified.   T1-2: There is no significant central canal stenosis. T2-3:  There is no significant central canal stenosis. T3-4: There is no significant central canal stenosis. T4-5: There is no significant central canal stenosis. T5-6: Minimal to moderate central disc osteophyte complex indents the ventral thecal sac narrowing of the central canal to approximately 7 mm in the anterior-posterior plane apparently abutting and perhaps minimally flattening the ventral spinal cord. Neuroforamina are patent. T6-7: There is no significant central canal stenosis. T7-8: Moderate left paracentral disc osteophyte complex flattens the left ventral spinal cord without definite spinal cord signal abnormality identified. The neuroforamina are not significantly narrowed. Central canal measures approximately 8 mm in the anterior-plane at the sagittal midline. T8-9: Small central herniation minimally indents the ventral thecal sac extending toward without clearly deforming the ventral spinal cord. The neuroforamina are patent. T9-10: Small left paracentral disc osteophyte complex extending 5 mm rostral to the intervertebral disc space indents the ventral thecal sac extending towards and perhaps minimally flattening the left ventral spinal cord. T10-11: No significant central canal stenosis. Moderate bilateral foraminal stenosis due to hypertrophic facet changes. T11-12: There is no significant central canal stenosis. T12-L1: Moderate central herniation indents the ventral thecal sac. Neuroforamina are patent.       Multilevel degenerative changes of the thoracic spine as above described worst at T7-8 with disc osteophyte complex flattens the left ventral spinal cord without definite spinal cord signal abnormality and additional findings as above. Please correlate  clinically.   Signed by: Navjot Wyatt 4/14/2024 5:37 PM Dictation workstation:   [id number]    CT lumbar spine wo IV contrast    Result Date: 4/7/2024  STUDY: CT Lumbar Spine without IV Contrast; 4/7/2024 11:03 AM INDICATION: Right lower back pain. COMPARISON: None Available. ACCESSION NUMBER(S): [id number] ORDERING CLINICIAN: CHARLINE FOLEY TECHNIQUE:  CT of the lumbar spine was performed without intravenous or intrathecal contrast.  Sagittal and coronal reconstructions were generated.  Automated mA/kV exposure control was utilized and patient examination was performed in strict accordance with principles of ALARA. FINDINGS: The normal lumbar lordotic curvature is maintained. Bone mineralization is normal. There is no evidence of acute or recent vertebral body fracture. At T12 there is a healed mild wedge compression fracture deformity. There is no evidence of spondylolisthesis. T11-T12: There is early bilateral facet joint osteoarthritis.  There is mild disc space narrowing. T12-L1: There is ossification of the posterior longitudinal ligament. This impinges upon the thecal sac.  There is mild posterior disc space narrowing. L1-L2: There is ossification of the posterior longitudinal ligament which impinges upon the thecal sac. L2-L3: There is mild posterior disc space narrowing.  There is ossification of the posterior longitudinal ligament which impinges upon the thecal sac. L3-L4: There is mild disc space narrowing.  There is ossification posterior longitudinal ligament which impinges upon the thecal sac. L4-L5: There is mild disc space narrowing.  There is ossification of posterior longitudinal ligament which impinges upon the thecal sac. L5-S1: Unremarkable.    1.  HEALED MILD WEDGE COMPRESSION DEFORMITY AT T12.  NO EVIDENCE OF ACUTE OSSEOUS PATHOLOGY. 2.  THERE IS OSSIFICATION OF POSTERIOR LONGITUDINAL LIGAMENT WHICH LEADS TO IMPINGEMENT UPON THE THECAL SAC FROM THE T12-L1 LEVEL THROUGH THE L4-L5 LEVEL.   CENTRAL STENOSIS IS NOT PRESENT.  Signed by Papi Goodson MD          Assessment:    63 year old male patient admitted for intractable back pain.   Upon assessment there is no midline spinal process tenderness or step off appreciated. Good bilateral strength 5/5 and sensation intact. Able to perform SLR.   Further recommendations to follow completion of work up.     Plan:  MRI Lumbar Spine   PT/OT for strength training   Consult pain management             Electronically signed by SARAH Gonzalez on 4/15/2024 at 7:01 AM     In a face-to-face encounter, I performed a history and physical examination, discussed pertinent diagnostic studies if indicated, and discussed diagnosis and management strategies with both the patient and the midlevel provider.  I reviewed the midlevel's note and agree with the documented findings and plan of care.    Patient initially had back pain but now has more right buttock and hamstring and calf pain in a radicular fashion.  He has a history of a surgery about 10 years ago with .  His pain was quite severe and he was admitted to the hospital but now he is more comfortable with pain medication.  He has no bowel or bladder changes.  There is no left-sided symptoms.  There is no fevers chills nausea vomiting.  There is no bowel or bladder changes.  He has had no physical therapy, chiropractic or recent epidurals.  Dr. Zaragoza is his pain management doctor.    His back is minimally tender.  He has a well-healed midline incision.  He has appropriate back range of motion.  He has good strength with hip flexion extension, knee flexion extension ankle dorsiflexion plantarflexion on the right and the left.  He has a positive straight leg raise on the right.  Range of motion of his hip and knees bilaterally does not reproduce any of his pain.    MRI of his thoracic spine does not show any significant stenosis that would explain the patient's right leg symptoms.  CT scan of the  lumbar spine does not show any significant stenosis but this is not an ideal test to assess lumbar stenosis, particularly in a patient who has had prior lumbar surgery.    Assessment/plan: Right leg radiculopathy.  Patient doing better compared to when he was admitted.  He has a BMI of 40.  He is not a great surgical candidate.  This is best treated as an outpatient.  I recommend we get him comfortable with pain management and have him discharge and follow-up with Dr. Zaragoza.  We will await the results of his MRI with and without gadolinium.  He does have a severe threat to inhibition of bodily function given the amount of pain he was in the past couple days.  This flareup started a couple weeks ago.  We have ordered and reviewed imaging studies.  The nurse was a helpful historian.  I personally discussed this case with Dr. Miller, pain management.    Tyrone Triana MD  Orthopedic surgery

## 2024-04-15 NOTE — TELEPHONE ENCOUNTER
Pt is currently admitted to [facility] and called to inquire about being transferred to [facility] in order to be seen by Dr. Kingston. Upcoming appointment was rescheduled, but once pt is discharged from [facility], pt wanted to know if he could be admitted to [facility] without going through the ER department. Would like someone to call for further assistance.

## 2024-04-15 NOTE — PROGRESS NOTES
Occupational Therapy                 Therapy Communication Note    Patient Name: Gary Sharma  MRN: [id number]  Today's Date: 4/15/2024     Discipline: Occupational Therapy    Missed Visit Reason: Missed Visit Reason:  (Pending MRI L spine.)    Comment: Attempted OT evaluation. Patient pending MRI L spine. Will re attempt as appropriate.

## 2024-04-15 NOTE — CONSULTS
Consults    Reason For Consult  Back pain radiating to the right lower extremity    History Of Present Illness  Gary Sharma is a 63 y.o. male presenting with back pain that is radiating to the right lower extremity.  The patient has history of back surgery 10 years ago.  However 2 weeks ago he started having pain in the back radiating to the right lower extremity specially in the outer surface of the leg and thigh.  He denied trauma or fall.  He denied change in his urinary or bowel habits.     Past Medical History  He has a past medical history of Cirrhosis (Multi), Diabetes mellitus (Multi), Hypertension, Other conditions influencing health status (11/03/2013), Personal history of other diseases of the digestive system, and Personal history of other endocrine, nutritional and metabolic disease.    Surgical History  He has a past surgical history that includes Hernia repair (07/30/2014); Tonsillectomy (07/30/2014); Knee arthroscopy w/ debridement (07/30/2014); Other surgical history (08/18/2014); and Other surgical history (08/18/2014).     Social History  He reports that he has never smoked. He has never been exposed to tobacco smoke. He has never used smokeless tobacco. Alcohol use questions deferred to the physician. He reports that he does not use drugs.    Family History  No family history on file.     Allergies  Patient has no known allergies.    Review of Systems  12 system symptoms have been inquired about and what was positive was placed in history of present illness     Physical Exam  The patient is alert, conscious and cooperative  HEENT within normal limits  Bilateral breath sounds  Heart regular S1-S2 no S3  Abdominal exam is unremarkable for signs of peritonitis  Neurological examination   cranial nerves II through XII within normal limit  Motor and sensory of upper and lower extremity within normal limit except for mild hypoesthesia in the distribution of right L5.       Last Recorded Vitals  BP  152/71 (BP Location: Left arm, Patient Position: Lying)   Pulse 99   Temp 36.9 °C (98.4 °F) (Temporal)   Resp 18   Wt 127 kg (280 lb)   SpO2 96%     Relevant Results  Expand All Collapse All    Inpatient consult to Orthopaedic Surgery  Consult performed by: SONY Gonzalez-CNP  Consult ordered by: Tyrone Pang MD  Reason for consult: back pain          Consult Note  Patient: Gary Sharma  Unit/Bed: 1012/1012-B  YOB: 1960  MRN: [id number]  Acct: [id number]   Admitting Diagnosis: Intractable back pain [M54.9]  Back pain of lumbosacral region with sciatica [M54.40]  Date:  4/14/2024  Hospital Day: 0     Complaint:  Back pain     History of Present Illness:  Gary Sharma is a 63 year old male with a history of HTN, HLD, poorly controlled DM2, degenerative OA, DIAZ cirrhosis, and renal carcinoma who presented with intractable back pain. Patient reports left lower back pain that radiates to his left leg. States the pain has progressively gotten worse over the past 2 weeks in which he is having difficulty with ambulating. Reports he is able to take a few steps but then will have to sit back down due to the pain. He states he has had a lumbar spine procedure about 10 years ago with Dr. De León for similar pain issues in which they got better and he feels have now come back. Patient is seeing pain management doctor. He denies any saddle anesthesia's, loss of bowel or bladder control or urinary retention. He denies injury.       PMHx:  Medical History        Past Medical History:   Diagnosis Date    Cirrhosis (Multi)      Diabetes mellitus (Multi)      Hypertension      Other conditions influencing health status 11/03/2013     Ureterocele    Personal history of other diseases of the digestive system       History of gastroesophageal reflux (GERD)    Personal history of other endocrine, nutritional and metabolic disease       History of hyperlipidemia            PSHx:  Surgical History          Past Surgical History:   Procedure Laterality Date    HERNIA REPAIR   07/30/2014     Hernia Repair    KNEE ARTHROSCOPY W/ DEBRIDEMENT   07/30/2014     Arthroscopy Knee Right    OTHER SURGICAL HISTORY   08/18/2014     Percutaneous Ablation Of Renal Tumor(S)    OTHER SURGICAL HISTORY   08/18/2014     Kidney Surgery Laparoscopic Partial Nephrectomy    TONSILLECTOMY   07/30/2014     Tonsillectomy            Social Hx:  Social History   Social History            Socioeconomic History    Marital status:        Spouse name: None    Number of children: None    Years of education: None    Highest education level: None   Occupational History    None   Tobacco Use    Smoking status: Never       Passive exposure: Never    Smokeless tobacco: Never   Vaping Use    Vaping status: Never Used   Substance and Sexual Activity    Alcohol use: Defer    Drug use: Never    Sexual activity: Defer   Other Topics Concern    None   Social History Narrative    None      Social Determinants of Health           Financial Resource Strain: Patient Declined (4/14/2024)     Overall Financial Resource Strain (CARDIA)      Difficulty of Paying Living Expenses: Patient declined   Food Insecurity: No Food Insecurity (3/18/2024)     Received from [facility]     Hunger Vital Sign      Worried About Running Out of Food in the Last Year: Never true      Ran Out of Food in the Last Year: Never true   Transportation Needs: Patient Declined (4/14/2024)     PRAPARE - Transportation      Lack of Transportation (Medical): Patient declined      Lack of Transportation (Non-Medical): Patient declined   Physical Activity: Inactive (3/18/2024)     Received from [facility]     Exercise Vital Sign      Days of Exercise per Week: 0 days      Minutes of Exercise per Session: 0 min   Stress: No Stress Concern Present (3/18/2024)     Received from [facility]     [employer] - Occupational Stress Questionnaire       Feeling of Stress : Only a little   Social Connections: Moderately Isolated (3/18/2024)     Received from [facility]     Social Connection and Isolation Panel [NHANES]      Frequency of Communication with Friends and Family: More than three times a week      Frequency of Social Gatherings with Friends and Family: Once a week      Attends Caodaism Services: Never      Active Member of Clubs or Organizations: No      Attends Club or Organization Meetings: Never      Marital Status:    Intimate Partner Violence: Not on file   Housing Stability: Patient Declined (4/14/2024)     Housing Stability Vital Sign      Unable to Pay for Housing in the Last Year: Patient declined      Number of Places Lived in the Last Year: 1      Unstable Housing in the Last Year: Patient declined            Family Hx:  Family History   No family history on file.        Review of Systems:   Review of Systems   Constitutional:  Negative for chills and fever.   HENT:  Negative for congestion and sore throat.    Respiratory:  Negative for cough, chest tightness and shortness of breath.    Cardiovascular:  Negative for chest pain and leg swelling.   Gastrointestinal:  Negative for abdominal pain, diarrhea, nausea and vomiting.   Genitourinary:  Negative for dysuria and hematuria.   Musculoskeletal:  Positive for back pain (left lower back that radiates down buttock to anterior thigh) and gait problem. Negative for neck pain.   Skin:  Negative for color change.   Neurological:  Negative for dizziness, weakness and numbness.            Physical Examination:    Visit Vitals  /76 (BP Location: Left arm, Patient Position: Lying)   Pulse 97   Temp 36.1 °C (97 °F) (Temporal)   Resp 18      Physical Exam  Constitutional:       Appearance: Normal appearance. He is obese. He is not ill-appearing.   Pulmonary:      Effort: Pulmonary effort is normal.   Abdominal:      General: Bowel sounds are normal.   Musculoskeletal:         General:  "Normal range of motion.      Cervical back: Normal range of motion and neck supple.      Comments: 5/5 strength in all extremities. Sensation intact.   Skin:     General: Skin is warm and dry.   Neurological:      Mental Status: He is alert and oriented to person, place, and time.   Psychiatric:         Mood and Affect: Mood normal.         Behavior: Behavior normal.            LABS:  CBC:         Lab Results   Component Value Date     WBC 8.0 04/15/2024     RBC 3.94 (L) 04/15/2024     HGB 11.7 (L) 04/15/2024     HCT 34.6 (L) 04/15/2024     MCV 88 04/15/2024     MCH 29.7 04/15/2024     MCHC 33.8 04/15/2024     RDW 13.2 04/15/2024      04/15/2024      CBC with Differential:          Lab Results   Component Value Date     WBC 8.0 04/15/2024     RBC 3.94 (L) 04/15/2024     HGB 11.7 (L) 04/15/2024     HCT 34.6 (L) 04/15/2024      04/15/2024     MCV 88 04/15/2024     MCH 29.7 04/15/2024     MCHC 33.8 04/15/2024     RDW 13.2 04/15/2024     NRBC 0.0 04/15/2024     LYMPHOPCT 27.8 04/14/2024     MONOPCT 4.1 04/14/2024     EOSPCT 4.2 04/14/2024     BASOPCT 0.6 04/14/2024     MONOSABS 0.26 04/14/2024     LYMPHSABS 1.77 04/14/2024     EOSABS 0.27 04/14/2024     BASOSABS 0.04 04/14/2024      CMP:          Lab Results   Component Value Date      (L) 04/15/2024     K 4.5 04/15/2024     CL 97 (L) 04/15/2024     CO2 28 04/15/2024     BUN 31 (H) 04/15/2024     CREATININE 1.27 04/15/2024     GLUCOSE 299 (H) 04/15/2024     PROT 8.3 (H) 04/14/2024     CALCIUM 9.7 04/15/2024     BILITOT 0.7 04/14/2024     ALKPHOS 36 04/14/2024     AST 50 (H) 04/14/2024     ALT 43 04/14/2024      BMP:          Lab Results   Component Value Date      (L) 04/15/2024     K 4.5 04/15/2024     CL 97 (L) 04/15/2024     CO2 28 04/15/2024     BUN 31 (H) 04/15/2024     CREATININE 1.27 04/15/2024     CALCIUM 9.7 04/15/2024     GLUCOSE 299 (H) 04/15/2024      Magnesium:No results found for: \"MG\"  Troponin:  No results found for: " "\"TROPONINI\"     Imaging:  MR thoracic spine wo IV contrast     Result Date: 4/14/2024  Interpreted By:  Navjot Wyatt, STUDY: MR THORACIC SPINE WO IV CONTRAST;  4/14/2024 5:20 pm   INDICATION: Signs/Symptoms:Lumbar pain radiates to left leg difficulty with walking.   COMPARISON: None.   ACCESSION NUMBER(S): [id number]   ORDERING CLINICIAN: MILLIE WASSERMAN   TECHNIQUE: Sagittal T1, T2, STIR and axial T2 and T1 weighted MR images of the thoracic spine were obtained.   FINDINGS: Vertebrae: The height, alignment, and signal of the thoracic vertebral bodies are preserved.   Spinal cord: No spinal cord signal abnormality was identified.   T1-2: There is no significant central canal stenosis. T2-3:  There is no significant central canal stenosis. T3-4: There is no significant central canal stenosis. T4-5: There is no significant central canal stenosis. T5-6: Minimal to moderate central disc osteophyte complex indents the ventral thecal sac narrowing of the central canal to approximately 7 mm in the anterior-posterior plane apparently abutting and perhaps minimally flattening the ventral spinal cord. Neuroforamina are patent. T6-7: There is no significant central canal stenosis. T7-8: Moderate left paracentral disc osteophyte complex flattens the left ventral spinal cord without definite spinal cord signal abnormality identified. The neuroforamina are not significantly narrowed. Central canal measures approximately 8 mm in the anterior-plane at the sagittal midline. T8-9: Small central herniation minimally indents the ventral thecal sac extending toward without clearly deforming the ventral spinal cord. The neuroforamina are patent. T9-10: Small left paracentral disc osteophyte complex extending 5 mm rostral to the intervertebral disc space indents the ventral thecal sac extending towards and perhaps minimally flattening the left ventral spinal cord. T10-11: No significant central canal stenosis. Moderate bilateral foraminal " stenosis due to hypertrophic facet changes. T11-12: There is no significant central canal stenosis. T12-L1: Moderate central herniation indents the ventral thecal sac. Neuroforamina are patent.        Multilevel degenerative changes of the thoracic spine as above described worst at T7-8 with disc osteophyte complex flattens the left ventral spinal cord without definite spinal cord signal abnormality and additional findings as above. Please correlate clinically.   Signed by: Navjot Wyatt 4/14/2024 5:37 PM Dictation workstation:   [id number]     CT lumbar spine wo IV contrast     Result Date: 4/7/2024  STUDY: CT Lumbar Spine without IV Contrast; 4/7/2024 11:03 AM INDICATION: Right lower back pain. COMPARISON: None Available. ACCESSION NUMBER(S): [id number] ORDERING CLINICIAN: CHARLINE FOLEY TECHNIQUE:  CT of the lumbar spine was performed without intravenous or intrathecal contrast.  Sagittal and coronal reconstructions were generated.  Automated mA/kV exposure control was utilized and patient examination was performed in strict accordance with principles of ALARA. FINDINGS: The normal lumbar lordotic curvature is maintained. Bone mineralization is normal. There is no evidence of acute or recent vertebral body fracture. At T12 there is a healed mild wedge compression fracture deformity. There is no evidence of spondylolisthesis. T11-T12: There is early bilateral facet joint osteoarthritis.  There is mild disc space narrowing. T12-L1: There is ossification of the posterior longitudinal ligament. This impinges upon the thecal sac.  There is mild posterior disc space narrowing. L1-L2: There is ossification of the posterior longitudinal ligament which impinges upon the thecal sac. L2-L3: There is mild posterior disc space narrowing.  There is ossification of the posterior longitudinal ligament which impinges upon the thecal sac. L3-L4: There is mild disc space narrowing.  There is ossification posterior longitudinal  ligament which impinges upon the thecal sac. L4-L5: There is mild disc space narrowing.  There is ossification of posterior longitudinal ligament which impinges upon the thecal sac. L5-S1: Unremarkable.     1.  HEALED MILD WEDGE COMPRESSION DEFORMITY AT T12.  NO EVIDENCE OF ACUTE OSSEOUS PATHOLOGY. 2.  THERE IS OSSIFICATION OF POSTERIOR LONGITUDINAL LIGAMENT WHICH LEADS TO IMPINGEMENT UPON THE THECAL SAC FROM THE T12-L1 LEVEL THROUGH TH           Assessment/Plan     63 years old gentleman with back pain and right lower extremity radiculopathy.  Has a history of postlaminectomy syndrome.  We are awaiting on the lumbar spine MRI study and will treat accordingly.    Lisbet Miller MD

## 2024-04-16 LAB
ANION GAP SERPL CALC-SCNC: 13 MMOL/L (ref 10–20)
BUN SERPL-MCNC: 31 MG/DL (ref 6–23)
CALCIUM SERPL-MCNC: 9 MG/DL (ref 8.6–10.3)
CHLORIDE SERPL-SCNC: 98 MMOL/L (ref 98–107)
CO2 SERPL-SCNC: 28 MMOL/L (ref 21–32)
CREAT SERPL-MCNC: 1.15 MG/DL (ref 0.5–1.3)
EGFRCR SERPLBLD CKD-EPI 2021: 72 ML/MIN/1.73M*2
ERYTHROCYTE [DISTWIDTH] IN BLOOD BY AUTOMATED COUNT: 13.4 % (ref 11.5–14.5)
GLUCOSE BLD MANUAL STRIP-MCNC: 176 MG/DL (ref 74–99)
GLUCOSE BLD MANUAL STRIP-MCNC: 197 MG/DL (ref 74–99)
GLUCOSE BLD MANUAL STRIP-MCNC: 272 MG/DL (ref 74–99)
GLUCOSE BLD MANUAL STRIP-MCNC: 279 MG/DL (ref 74–99)
GLUCOSE SERPL-MCNC: 220 MG/DL (ref 74–99)
HCT VFR BLD AUTO: 35 % (ref 41–52)
HGB BLD-MCNC: 11.8 G/DL (ref 13.5–17.5)
HOLD SPECIMEN: NORMAL
MCH RBC QN AUTO: 30.3 PG (ref 26–34)
MCHC RBC AUTO-ENTMCNC: 33.7 G/DL (ref 32–36)
MCV RBC AUTO: 90 FL (ref 80–100)
NRBC BLD-RTO: 0 /100 WBCS (ref 0–0)
PLATELET # BLD AUTO: 187 X10*3/UL (ref 150–450)
POTASSIUM SERPL-SCNC: 4.3 MMOL/L (ref 3.5–5.3)
RBC # BLD AUTO: 3.9 X10*6/UL (ref 4.5–5.9)
SODIUM SERPL-SCNC: 135 MMOL/L (ref 136–145)
WBC # BLD AUTO: 7.7 X10*3/UL (ref 4.4–11.3)

## 2024-04-16 PROCEDURE — 2500000004 HC RX 250 GENERAL PHARMACY W/ HCPCS (ALT 636 FOR OP/ED): Performed by: ANESTHESIOLOGY

## 2024-04-16 PROCEDURE — 36415 COLL VENOUS BLD VENIPUNCTURE: CPT | Performed by: REGISTERED NURSE

## 2024-04-16 PROCEDURE — 80048 BASIC METABOLIC PNL TOTAL CA: CPT | Performed by: REGISTERED NURSE

## 2024-04-16 PROCEDURE — 2500000001 HC RX 250 WO HCPCS SELF ADMINISTERED DRUGS (ALT 637 FOR MEDICARE OP): Performed by: STUDENT IN AN ORGANIZED HEALTH CARE EDUCATION/TRAINING PROGRAM

## 2024-04-16 PROCEDURE — 2500000002 HC RX 250 W HCPCS SELF ADMINISTERED DRUGS (ALT 637 FOR MEDICARE OP, ALT 636 FOR OP/ED): Performed by: STUDENT IN AN ORGANIZED HEALTH CARE EDUCATION/TRAINING PROGRAM

## 2024-04-16 PROCEDURE — 2500000001 HC RX 250 WO HCPCS SELF ADMINISTERED DRUGS (ALT 637 FOR MEDICARE OP): Performed by: REGISTERED NURSE

## 2024-04-16 PROCEDURE — 96372 THER/PROPH/DIAG INJ SC/IM: CPT | Performed by: STUDENT IN AN ORGANIZED HEALTH CARE EDUCATION/TRAINING PROGRAM

## 2024-04-16 PROCEDURE — 2500000005 HC RX 250 GENERAL PHARMACY W/O HCPCS: Performed by: STUDENT IN AN ORGANIZED HEALTH CARE EDUCATION/TRAINING PROGRAM

## 2024-04-16 PROCEDURE — 99232 SBSQ HOSP IP/OBS MODERATE 35: CPT | Performed by: STUDENT IN AN ORGANIZED HEALTH CARE EDUCATION/TRAINING PROGRAM

## 2024-04-16 PROCEDURE — 2500000004 HC RX 250 GENERAL PHARMACY W/ HCPCS (ALT 636 FOR OP/ED): Performed by: STUDENT IN AN ORGANIZED HEALTH CARE EDUCATION/TRAINING PROGRAM

## 2024-04-16 PROCEDURE — 85027 COMPLETE CBC AUTOMATED: CPT | Performed by: REGISTERED NURSE

## 2024-04-16 PROCEDURE — 97165 OT EVAL LOW COMPLEX 30 MIN: CPT | Mod: GO

## 2024-04-16 PROCEDURE — 1210000001 HC SEMI-PRIVATE ROOM DAILY

## 2024-04-16 PROCEDURE — 97161 PT EVAL LOW COMPLEX 20 MIN: CPT | Mod: GP

## 2024-04-16 PROCEDURE — 82947 ASSAY GLUCOSE BLOOD QUANT: CPT

## 2024-04-16 RX ORDER — METHYLPREDNISOLONE 4 MG/1
24 TABLET ORAL ONCE
Status: COMPLETED | OUTPATIENT
Start: 2024-04-16 | End: 2024-04-16

## 2024-04-16 RX ORDER — HYDROMORPHONE HYDROCHLORIDE 1 MG/ML
0.6 INJECTION, SOLUTION INTRAMUSCULAR; INTRAVENOUS; SUBCUTANEOUS
Status: DISCONTINUED | OUTPATIENT
Start: 2024-04-16 | End: 2024-04-16

## 2024-04-16 RX ORDER — HYDROMORPHONE HYDROCHLORIDE 1 MG/ML
1 INJECTION, SOLUTION INTRAMUSCULAR; INTRAVENOUS; SUBCUTANEOUS
Status: DISCONTINUED | OUTPATIENT
Start: 2024-04-16 | End: 2024-04-17

## 2024-04-16 RX ORDER — METHYLPREDNISOLONE 4 MG/1
8 TABLET ORAL ONCE
Status: DISCONTINUED | OUTPATIENT
Start: 2024-04-20 | End: 2024-04-18 | Stop reason: HOSPADM

## 2024-04-16 RX ORDER — METHYLPREDNISOLONE 4 MG/1
12 TABLET ORAL ONCE
Status: DISCONTINUED | OUTPATIENT
Start: 2024-04-19 | End: 2024-04-18 | Stop reason: HOSPADM

## 2024-04-16 RX ORDER — METHYLPREDNISOLONE 4 MG/1
4 TABLET ORAL ONCE
Status: DISCONTINUED | OUTPATIENT
Start: 2024-04-21 | End: 2024-04-18 | Stop reason: HOSPADM

## 2024-04-16 RX ORDER — OXYCODONE AND ACETAMINOPHEN 5; 325 MG/1; MG/1
1 TABLET ORAL EVERY 6 HOURS PRN
Status: DISCONTINUED | OUTPATIENT
Start: 2024-04-16 | End: 2024-04-18 | Stop reason: HOSPADM

## 2024-04-16 RX ORDER — METHYLPREDNISOLONE 4 MG/1
16 TABLET ORAL ONCE
Status: COMPLETED | OUTPATIENT
Start: 2024-04-18 | End: 2024-04-18

## 2024-04-16 RX ORDER — METHYLPREDNISOLONE 4 MG/1
20 TABLET ORAL ONCE
Status: COMPLETED | OUTPATIENT
Start: 2024-04-17 | End: 2024-04-17

## 2024-04-16 RX ADMIN — Medication 3 MG: at 20:47

## 2024-04-16 RX ADMIN — CARVEDILOL 6.25 MG: 6.25 TABLET, FILM COATED ORAL at 17:23

## 2024-04-16 RX ADMIN — INSULIN LISPRO 2 UNITS: 100 INJECTION, SOLUTION INTRAVENOUS; SUBCUTANEOUS at 09:32

## 2024-04-16 RX ADMIN — CEPHALEXIN 500 MG: 500 CAPSULE ORAL at 02:42

## 2024-04-16 RX ADMIN — CEPHALEXIN 500 MG: 500 CAPSULE ORAL at 09:30

## 2024-04-16 RX ADMIN — CEPHALEXIN 500 MG: 500 CAPSULE ORAL at 20:47

## 2024-04-16 RX ADMIN — ACETAMINOPHEN 650 MG: 325 TABLET ORAL at 17:23

## 2024-04-16 RX ADMIN — HYDROMORPHONE HYDROCHLORIDE 0.6 MG: 1 INJECTION, SOLUTION INTRAMUSCULAR; INTRAVENOUS; SUBCUTANEOUS at 02:42

## 2024-04-16 RX ADMIN — HYDROMORPHONE HYDROCHLORIDE 1 MG: 1 INJECTION, SOLUTION INTRAMUSCULAR; INTRAVENOUS; SUBCUTANEOUS at 20:48

## 2024-04-16 RX ADMIN — HYDROMORPHONE HYDROCHLORIDE 0.6 MG: 1 INJECTION, SOLUTION INTRAMUSCULAR; INTRAVENOUS; SUBCUTANEOUS at 06:46

## 2024-04-16 RX ADMIN — LIDOCAINE 4% 2 PATCH: 40 PATCH TOPICAL at 09:44

## 2024-04-16 RX ADMIN — FENOFIBRATE 108 MG: 54 TABLET ORAL at 20:47

## 2024-04-16 RX ADMIN — AMLODIPINE BESYLATE 10 MG: 5 TABLET ORAL at 09:29

## 2024-04-16 RX ADMIN — POLYETHYLENE GLYCOL 3350 17 G: 17 POWDER, FOR SOLUTION ORAL at 09:34

## 2024-04-16 RX ADMIN — INSULIN GLARGINE 68 UNITS: 100 INJECTION, SOLUTION SUBCUTANEOUS at 12:47

## 2024-04-16 RX ADMIN — HYDROMORPHONE HYDROCHLORIDE 1 MG: 1 INJECTION, SOLUTION INTRAMUSCULAR; INTRAVENOUS; SUBCUTANEOUS at 14:12

## 2024-04-16 RX ADMIN — LOSARTAN POTASSIUM 100 MG: 100 TABLET, FILM COATED ORAL at 09:29

## 2024-04-16 RX ADMIN — HYDROMORPHONE HYDROCHLORIDE 0.6 MG: 1 INJECTION, SOLUTION INTRAMUSCULAR; INTRAVENOUS; SUBCUTANEOUS at 09:59

## 2024-04-16 RX ADMIN — ATORVASTATIN CALCIUM 20 MG: 20 TABLET, FILM COATED ORAL at 20:47

## 2024-04-16 RX ADMIN — INSULIN GLARGINE 68 UNITS: 100 INJECTION, SOLUTION SUBCUTANEOUS at 23:20

## 2024-04-16 RX ADMIN — CEPHALEXIN 500 MG: 500 CAPSULE ORAL at 16:39

## 2024-04-16 RX ADMIN — ENOXAPARIN SODIUM 40 MG: 40 INJECTION SUBCUTANEOUS at 20:47

## 2024-04-16 RX ADMIN — METHYLPREDNISOLONE 24 MG: 4 TABLET ORAL at 12:42

## 2024-04-16 RX ADMIN — INSULIN LISPRO 6 UNITS: 100 INJECTION, SOLUTION INTRAVENOUS; SUBCUTANEOUS at 12:48

## 2024-04-16 RX ADMIN — PANTOPRAZOLE SODIUM 40 MG: 40 TABLET, DELAYED RELEASE ORAL at 06:47

## 2024-04-16 RX ADMIN — ENOXAPARIN SODIUM 40 MG: 40 INJECTION SUBCUTANEOUS at 09:30

## 2024-04-16 RX ADMIN — ACETAMINOPHEN 650 MG: 325 TABLET ORAL at 23:20

## 2024-04-16 RX ADMIN — ASPIRIN 81 MG: 81 TABLET, CHEWABLE ORAL at 09:30

## 2024-04-16 RX ADMIN — ACETAMINOPHEN 650 MG: 325 TABLET ORAL at 06:46

## 2024-04-16 RX ADMIN — GABAPENTIN 300 MG: 300 CAPSULE ORAL at 20:47

## 2024-04-16 RX ADMIN — OXYCODONE HYDROCHLORIDE AND ACETAMINOPHEN 1 TABLET: 5; 325 TABLET ORAL at 12:42

## 2024-04-16 RX ADMIN — CARVEDILOL 6.25 MG: 6.25 TABLET, FILM COATED ORAL at 09:44

## 2024-04-16 RX ADMIN — INSULIN LISPRO 2 UNITS: 100 INJECTION, SOLUTION INTRAVENOUS; SUBCUTANEOUS at 17:24

## 2024-04-16 RX ADMIN — ACETAMINOPHEN 650 MG: 325 TABLET ORAL at 12:47

## 2024-04-16 RX ADMIN — INSULIN LISPRO 26 UNITS: 100 INJECTION, SOLUTION INTRAVENOUS; SUBCUTANEOUS at 12:46

## 2024-04-16 RX ADMIN — OXYCODONE HYDROCHLORIDE AND ACETAMINOPHEN 1 TABLET: 5; 325 TABLET ORAL at 19:36

## 2024-04-16 RX ADMIN — INSULIN LISPRO 26 UNITS: 100 INJECTION, SOLUTION INTRAVENOUS; SUBCUTANEOUS at 17:24

## 2024-04-16 ASSESSMENT — COGNITIVE AND FUNCTIONAL STATUS - GENERAL
MOBILITY SCORE: 22
TURNING FROM BACK TO SIDE WHILE IN FLAT BAD: A LITTLE
MOVING FROM LYING ON BACK TO SITTING ON SIDE OF FLAT BED WITH BEDRAILS: A LITTLE
TURNING FROM BACK TO SIDE WHILE IN FLAT BAD: A LITTLE
CLIMB 3 TO 5 STEPS WITH RAILING: A LITTLE
STANDING UP FROM CHAIR USING ARMS: A LITTLE
DAILY ACTIVITIY SCORE: 23
MOBILITY SCORE: 22
DAILY ACTIVITIY SCORE: 23
MOBILITY SCORE: 18
DAILY ACTIVITIY SCORE: 24
MOVING TO AND FROM BED TO CHAIR: A LITTLE
CLIMB 3 TO 5 STEPS WITH RAILING: A LITTLE
HELP NEEDED FOR BATHING: A LITTLE
DRESSING REGULAR LOWER BODY CLOTHING: A LITTLE
WALKING IN HOSPITAL ROOM: A LITTLE
MOVING FROM LYING ON BACK TO SITTING ON SIDE OF FLAT BED WITH BEDRAILS: A LITTLE
WALKING IN HOSPITAL ROOM: A LITTLE

## 2024-04-16 ASSESSMENT — PAIN - FUNCTIONAL ASSESSMENT
PAIN_FUNCTIONAL_ASSESSMENT: 0-10

## 2024-04-16 ASSESSMENT — PAIN SCALES - GENERAL
PAINLEVEL_OUTOF10: 8
PAINLEVEL_OUTOF10: 9
PAINLEVEL_OUTOF10: 7
PAINLEVEL_OUTOF10: 4
PAINLEVEL_OUTOF10: 7
PAINLEVEL_OUTOF10: 5 - MODERATE PAIN
PAINLEVEL_OUTOF10: 8
PAINLEVEL_OUTOF10: 4
PAINLEVEL_OUTOF10: 7
PAINLEVEL_OUTOF10: 0 - NO PAIN

## 2024-04-16 ASSESSMENT — ACTIVITIES OF DAILY LIVING (ADL): BATHING_ASSISTANCE: STAND BY

## 2024-04-16 ASSESSMENT — PAIN DESCRIPTION - DESCRIPTORS: DESCRIPTORS: ACHING

## 2024-04-16 ASSESSMENT — PAIN DESCRIPTION - LOCATION
LOCATION: LEG
LOCATION: OTHER (COMMENT)

## 2024-04-16 ASSESSMENT — PAIN DESCRIPTION - ORIENTATION
ORIENTATION: RIGHT;OTHER (COMMENT)
ORIENTATION: RIGHT

## 2024-04-16 NOTE — PROGRESS NOTES
Spoke with patient and his daughter at  bedside, patient states he still feels the same as when he came in with his back pain. PT/OT ordered, PT AM PAC is 18, will not requite inpatient rehab. Patient plan is to follow up with his pain provider Dr. Dolan and also Dr. Gonzales neurosurgeon, for further recommendations. Plan at this time is home, PT/OT outpatient perhaps after patient is assessed by Dr. Gonzales for proper PT treatment. Will continue to follow.

## 2024-04-16 NOTE — PROGRESS NOTES
Gary Sharma is a 63 y.o. male on day 0 of admission presenting with Intractable back pain.      Subjective   Patient is mostly in the right groin area today.  Right L5 radiculopathy is disappearing.       Objective     Last Recorded Vitals  /79   Pulse 81   Temp 35.6 °C (96.1 °F)   Resp 16   Wt 127 kg (280 lb)   SpO2 93%   Intake/Output last 3 Shifts:    Intake/Output Summary (Last 24 hours) at 4/16/2024 0939  Last data filed at 4/15/2024 2114  Gross per 24 hour   Intake 200 ml   Output --   Net 200 ml       Admission Weight  Weight: 127 kg (280 lb) (04/14/24 1123)    Daily Weight  04/14/24 : 127 kg (280 lb)    Image Results  MR lumbar spine w and wo IV contrast  Narrative: Interpreted By:  Beronica Woods,   STUDY:  MRI of the lumbar spine without IV contrast;  4/15/2024 6:57 pm      INDICATION:  Signs/Symptoms:back pain.      COMPARISON:  CT lumbar spine 04/07/2024.      ACCESSION NUMBER(S):  [id number]      ORDERING CLINICIAN:  ALEXX BERGER      TECHNIQUE:  Sagittal and axial STIR and T1-weighted MRI images of the lumbar  spine were acquired using a spondylolysis protocol.  25 mL Dotarem  injected intravenously. Sagittal and axial T1 postcontrast images  were performed.      FINDINGS:  For counting purposes the last lumbarized vertebral body is labeled  L5.      Alignment, vertebral body heights and marrow signal pattern are  within normal limits.      Trace edema at L4-L5 likely represents Modic type 1 type changes.      There is desiccated disc signal throughout the lumbar spine with mild  degenerative endplate changes.      Mild disc height loss at L2-L3, L3-L4 and L4-L5.      The conus terminates at L1 and is unremarkable. No abnormal signal or  enhancement within the distal cord or nerve roots.      Prevertebral soft tissues are not thickened.      Evaluation by level:      T12-L1: Disc bulge and facet arthrosis. Mild spinal canal stenosis.  No neural foraminal stenosis.      L1-L2: Disc  bulge, facet arthrosis and mild ligamentum flavum  thickening. Mild-to-moderate spinal canal stenosis, mild narrowing of  the subarticular recess, mild left and no right neural foraminal  stenosis.      L2-L3: Disc bulge, facet arthrosis and ligamentum flavum thickening.  Moderate spinal canal stenosis, narrowing of the subarticular recess  and moderate neural foraminal stenosis.      L3-L4: Disc bulge, facet arthrosis and prominent posterior epidural  fat results in moderate spinal canal stenosis, narrowing of the  subarticular recess and moderate bilateral neural foraminal stenosis.      L4-L5: Disc bulge, facet arthrosis and ligamentum flavum thickening.  Moderate to severe spinal canal stenosis, narrowing of the  subarticular recess, moderate to severe right and moderate left  neural foraminal stenosis.      L5-S1: Disc bulge and facet arthrosis. No spinal canal stenosis. Mild  neural foraminal stenosis.      Impression: Multilevel degenerative disc disease and facet arthrosis most  pronounced at L4-L5 with moderate to severe spinal canal stenosis,  narrowing of the subarticular recess, moderate to severe right and  moderate left neural foraminal stenosis.      I personally reviewed the images/study and I agree with the findings  as stated. This study was interpreted at [place], [place].      MACRO:  None      Signed by: Beronica Woods 4/16/2024 8:42 AM  Dictation workstation:   [id number]      Physical Exam  The patient has severe tenderness of L4-5 and L5-S1 facet joints.  He has also severe tenderness on the SI joints bilaterally with positive Yony's sign.  Right lumbar scar at the level of L5-S1 was also noticed.  Relevant Results               Assessment/Plan      At this time and due to the multiplicity of locations that could produce back pain and right groin pain, will treat the patient conservatively and engage physical therapy as available measure.  However, the patient may need right L4-5 transforaminal steroid injection, bilateral SI steroid injection and bilateral L4-5 and L5-S1 facet steroid injections.            Principal Problem:    Intractable back pain    The patient has multiple symptoms related to degenerative joint disease and degenerative disc disease  1-he has L4-5 degenerative disc disease with spinal stenosis and neuroforaminal stenosis right more than left.  However his radiculopathy is disappearing at the moment.  2-Severe facet hypertrophy at L4-5 and L5-S1 bilaterally  3-bilateral sacroiliitis right more than left and that could be the reason for right groin pain.  4-Surgical consultation for possible intervention.                Lisbet Miller MD

## 2024-04-16 NOTE — TELEPHONE ENCOUNTER
Left message for the patient to please contact the office, regarding Dr Kingston's response. Also Dr Kingston stated he does have privalages at  and if they want to put in a consult to Dr Kingston he would be more then happy to go see the patient other then that he can not admit to [facility] without seeing the patient, if the patient is home Dr Kingston would be glad to see him in the office today.

## 2024-04-16 NOTE — CARE PLAN
The patient's goals for the shift include  manage pain to tolerable level    The clinical goals for the shift include pain less than 3    Over the shift, the patient did not make progress toward the following goals. Barriers to progression include none. Recommendations to address these barriers include none.      Problem: Pain  Goal: My pain/discomfort is manageable  Outcome: Progressing     Problem: Safety  Goal: Patient will be injury free during hospitalization  Outcome: Progressing  Goal: I will remain free of falls  Outcome: Progressing     Problem: Daily Care  Goal: Daily care needs are met  Outcome: Progressing     Problem: Psychosocial Needs  Goal: Demonstrates ability to cope with hospitalization/illness  Outcome: Progressing  Goal: Collaborate with me, my family, and caregiver to identify my specific goals  Outcome: Progressing     Problem: Discharge Barriers  Goal: My discharge needs are met  Outcome: Progressing

## 2024-04-16 NOTE — PROGRESS NOTES
"Daily progress note    Gary Sharma is 63 y.o. male with a history of poorly controlled type 2 diabetes, hypertension, degenerative osteoarthritis, hyperlipidemia, DIAZ cirrhosis, renal carcinoma presenting with intractable back pain.  Orthopedics consulted, patient does not have saddle anesthesia or loss of bowel or bladder function or urinary retention, MRI of lumbar spine was done and showed multilevel degenerative disc disease and facet arthrosis pronounced at L4-L5 with moderate to severe spinal canal stenosis,    Subjective  Patient was seen and examined at bedside  Patient reports severe pain over the right lower extremity difficulty with movement.  No weakness or sensory   Intact bowel and bladder function      Vital signs in last 24 hours:  Temp:  [35.6 °C (96.1 °F)-36.7 °C (98.1 °F)] 35.6 °C (96.1 °F)  Heart Rate:  [74-83] 81  Resp:  [16] 16  BP: (132-155)/(73-82) 155/79  /79   Pulse 81   Temp 35.6 °C (96.1 °F)   Resp 16   Ht 1.778 m (5' 10\")   Wt 127 kg (280 lb)   SpO2 93%   BMI 40.18 kg/m²    Intake/Output last 3 shifts:  I/O last 3 completed shifts:  In: 950 (7.5 mL/kg) [P.O.:950]  Out: 400 (3.1 mL/kg) [Urine:400 (0.1 mL/kg/hr)]  Weight: 127 kg   Intake/Output this shift:  I/O this shift:  In: 400 [P.O.:400]  Out: -     Physical Exam  Constitutional:       General: He is not in acute distress.     Appearance: Normal appearance. He is not ill-appearing, toxic-appearing or diaphoretic.   HENT:      Head: Normocephalic and atraumatic.      Mouth/Throat:      Mouth: Mucous membranes are moist.      Pharynx: No oropharyngeal exudate.   Eyes:      Extraocular Movements: Extraocular movements intact.      Pupils: Pupils are equal, round, and reactive to light.   Cardiovascular:      Rate and Rhythm: Normal rate and regular rhythm.      Heart sounds: No murmur heard.  Pulmonary:      Effort: Pulmonary effort is normal. No respiratory distress.      Breath sounds: Normal breath sounds. No " wheezing.   Abdominal:      General: Abdomen is flat. Bowel sounds are normal. There is no distension.      Tenderness: There is no abdominal tenderness. There is no guarding or rebound.   Musculoskeletal:         General: No swelling.      Right lower leg: No edema.      Left lower leg: No edema.   Skin:     Findings: No lesion or rash.   Neurological:      General: No focal deficit present.      Mental Status: He is alert and oriented to person, place, and time.      Cranial Nerves: No cranial nerve deficit.      Motor: No weakness.   Psychiatric:         Mood and Affect: Mood normal.         Behavior: Behavior normal.         Current medication   Current Facility-Administered Medications   Medication Dose Route Frequency Provider Last Rate Last Admin    acetaminophen (Tylenol) tablet 650 mg  650 mg oral q4h PRN Tyrone Pang MD        Or    acetaminophen (Tylenol) oral liquid 650 mg  650 mg nasogastric tube q4h PRN Tyrone Pang MD        Or    acetaminophen (Tylenol) suppository 650 mg  650 mg rectal q4h PRN Tyrone Pang MD        acetaminophen (Tylenol) tablet 650 mg  650 mg oral q6h Tyrone Pang MD   650 mg at 04/16/24 0646    amLODIPine (Norvasc) tablet 10 mg  10 mg oral Daily Tyrone Pang MD   10 mg at 04/16/24 0929    aspirin chewable tablet 81 mg  81 mg oral Daily SARAH Augustin   81 mg at 04/16/24 0930    atorvastatin (Lipitor) tablet 20 mg  20 mg oral Nightly Tyrone Pang MD   20 mg at 04/15/24 2017    carvedilol (Coreg) tablet 6.25 mg  6.25 mg oral BID with meals Tyrone Pang MD   6.25 mg at 04/15/24 1613    cephalexin (Keflex) capsule 500 mg  500 mg oral q6h Count includes the [facility] SARAH Augustin   500 mg at 04/16/24 0930    dextrose 50 % injection 12.5 g  12.5 g intravenous q15 min PRN Tyrone Pang MD        dextrose 50 % injection 25 g  25 g intravenous q15 min PRN Tyrone Pang MD        enoxaparin (Lovenox) syringe 40 mg  40 mg subcutaneous q12h  [facility] Tyrone Pang MD   40 mg at 04/16/24 0930    fenofibrate (Tricor) tablet 108 mg  108 mg oral Nightly SARAH Augustin   108 mg at 04/15/24 2017    gabapentin (Neurontin) capsule 300 mg  300 mg oral Nightly SARAH Augustin   300 mg at 04/15/24 2017    glucagon (Glucagen) injection 1 mg  1 mg intramuscular q15 min PRN Tyrone Pang MD        glucagon (Glucagen) injection 1 mg  1 mg intramuscular q15 min PRN Tyrone Pang MD        HYDROmorphone (Dilaudid) injection 0.6 mg  0.6 mg intravenous q3h PRN Ekaterina Rooney MD        insulin glargine (Lantus) injection 68 Units  68 Units subcutaneous q12h Tyrone Pang MD   68 Units at 04/15/24 2304    insulin lispro (HumaLOG) injection 0-10 Units  0-10 Units subcutaneous TID with meals SARAH Augustin   2 Units at 04/16/24 0932    insulin lispro (HumaLOG) injection 26 Units  26 Units subcutaneous TID with meals Tyrone Pang MD   26 Units at 04/15/24 1616    lidocaine 4 % patch 2 patch  2 patch transdermal Daily Tyrone Pang MD   2 patch at 04/15/24 0833    losartan (Cozaar) tablet 100 mg  100 mg oral Daily Tyrone Pang MD   100 mg at 04/16/24 0929    melatonin tablet 3 mg  3 mg oral Nightly PRN Tyrone Pang MD        ondansetron (Zofran) injection 4 mg  4 mg intravenous q4h PRN Tyrone Pang MD        oxyCODONE-acetaminophen (Percocet) 5-325 mg per tablet 1 tablet  1 tablet oral q6h PRN Ekaterina Rooney MD        pantoprazole (ProtoNix) EC tablet 40 mg  40 mg oral Daily before breakfast Tyrone Pang MD   40 mg at 04/16/24 0647    polyethylene glycol (Glycolax, Miralax) packet 17 g  17 g oral Daily Tyrone Pang MD   17 g at 04/16/24 0934        Labs  Lab Results   Component Value Date    WBC 7.7 04/16/2024    HGB 11.8 (L) 04/16/2024    HCT 35.0 (L) 04/16/2024    MCV 90 04/16/2024     04/16/2024     Lab Results   Component Value Date    HGBA1C 9.9 (H) 03/08/2024     Lab  Results   Component Value Date    GLUCOSE 220 (H) 04/16/2024    CALCIUM 9.0 04/16/2024     (L) 04/16/2024    K 4.3 04/16/2024    CO2 28 04/16/2024    CL 98 04/16/2024    BUN 31 (H) 04/16/2024    CREATININE 1.15 04/16/2024           Principal Problem:    Intractable back pain      Assessment and Plan   #.  Intractable back pain with sciatica  #.  Degenerative osteoarthritis of spine  -Thoracic MRI showing multilevel degenerative changes without definite spinal cord signal abnormality  Lumbar MRI was done and showed multilevel degenerative disc disease and facet arthrosis pronounced at L4-L5 with moderate to severe spinal canal stenosis,  Was also evaluated by orthopedics, orthopedics signed off no intervention  Pain management on board  Patient started on steroids p.o.  Continue tapering dose of steroid and pain management with Dilaudid and Percocet  PT/OT       #.  Type 2 diabetes  -Poorly controlled, A1c 9.9%  Continue Lantus 68 units twice daily, Humalog 26 units with meals  -Carb controlled diet  -Hypoglycemic protocol     #.  Hypertension  #.  HLD  Continue     #.  DIAZ cirrhosis  -Stable,      VTE PPX: Lovenox  Disposition pending optimization of pain control Home versus rehab   LOS: 0 days

## 2024-04-16 NOTE — PROGRESS NOTES
MRI lumbar spine reviewed and discussed with spine surgeon, Dr. Triana.   Multiple levels of moderate central stenosis but main finding is right sided foraminal stenosis at L4-5. No surgical intervention warranted during this hospitalization.     Continue with pain control per pain management and follow up as an outpatient.     Orthopedics will sign off please call for any questions or concerns.

## 2024-04-16 NOTE — TELEPHONE ENCOUNTER
Unable to directly admit patient to [facility] without first being seen.  If he is unable to take care of himself at home he should present himself to the emergency room for evaluation and appropriate disposition including possible admission.

## 2024-04-16 NOTE — PROGRESS NOTES
Physical Therapy    Physical Therapy Evaluation    Patient Name: Gary Sharma  MRN: [id number]  Today's Date: 4/16/2024   Time Calculation  Start Time: 1111  Stop Time: 1123  Time Calculation (min): 12 min    Assessment/Plan   PT Assessment  PT Assessment Results: Decreased strength, Decreased endurance, Impaired balance, Pain, Decreased mobility  Rehab Prognosis: Good  Evaluation/Treatment Tolerance: Patient limited by fatigue, Patient limited by pain  End of Session Communication: Bedside nurse  Assessment Comment: pt with decreased mobility/gait , strength  pain  pt to benefit from skilled PT to address deficits and improve functional mobility .  End of Session Patient Position: Bed, 3 rail up, Alarm off, not on at start of session (DTR present)  IP OR SWING BED PT PLAN  Inpatient or Swing Bed: Inpatient  PT Plan  Treatment/Interventions: Bed mobility, Transfer training, Gait training, Stair training, Balance training, Strengthening, Endurance training, Therapeutic exercise, Therapeutic activity, Home exercise program  PT Plan: Skilled PT  PT Frequency: 2 times per week  PT Discharge Recommendations: Low intensity level of continued care  Equipment Recommended upon Discharge: Wheeled walker  PT Recommended Transfer Status: Assistive device, Stand by assist, Assist x1  PT - OK to Discharge: Yes (once medically ready for discharge to next level of care.)    Subjective     Current Problem:  Patient Active Problem List   Diagnosis    Intractable back pain       General Visit Information:  General  Reason for Referral: impaired mobilty, weakness back pain  Referred By: OT/PT Raj 4/15  Past Medical History Relevant to Rehab: OA, DM2, HLD, renal carcinoma, sciatica , DIAZ, back sx 10 years ago.  Missed Visit: Yes  Missed Visit Reason: Other (Comment) (PT /OT attempt . pt with pending Lumbar MRI and L UE US to R/o DVT.  re-attempt as able)  Prior to Session Communication: Bedside nurse (cleared to see for therapy  nawaf.)  Patient Position Received: Bed, 3 rail up, Alarm off, not on at start of session  General Comment: pt is a 62 yo male came to the hospital on  4/14 with reports of back pain .  test/ labs :   US L UE neg DVT,  MRI L spine multilevel DDD, most pronounced at L4-L5, severe spinal canal stenosis ,  mod to severe R and mod L neural formnal stenosis .  ortho consult ref to pain management.    Home Living:  Home Living  Home Living Comments: lives with wife 2 level home  2 steps with rail to enter.  pt has a walk in shower with grab bar.    Prior Level of Function:  Prior Function Per Pt/Caregiver Report  Prior Function Comments: per pt IND with all mobilty adls and iadls. until his back started bothering him.  started to use a cane within last few days .  1 fall in last month,  drives . pt reports hes retired.    Precautions:  Precautions  Medical Precautions: Fall precautions  Objective     Pain:  Pain Assessment  Pain Assessment: 0-10  Pain Score: 8  Pain Location: Back (pain down R LE)  Pain Orientation: Right    Cognition:  Cognition  Overall Cognitive Status: Within Functional Limits  Orientation Level: Oriented X4    General Assessments:  Strength  Strength Comments: R LE 4-/5  L LE 5/5    Dynamic Sitting Balance  Dynamic Sitting-Comments: good  Dynamic Standing Balance  Dynamic Standing-Comments: fair /fair -    Functional Assessments:     Bed Mobility  Bed Mobility: Yes  Bed Mobility 1  Bed Mobility 1: Supine to sitting, Sitting to supine  Level of Assistance 1: Close supervision  Bed Mobility Comments 1: SBA  Transfers  Transfer: Yes  Transfer 1  Technique 1: Sit to stand, Stand to sit  Transfer Device 1: Cane  Transfer Level of Assistance 1: Close supervision  Trials/Comments 1: SBA SPC  Ambulation/Gait Training  Ambulation/Gait Training Performed: Yes  Ambulation/Gait Training 1  Surface 1: Level tile  Device 1: Single point cane  Assistance 1: Close supervision  Quality of Gait 1: Antalgic,  Inconsistent stride length, Decreased step length  Comments/Distance (ft) 1: SBA SPC 30 ft . slow gait . increased pain . ed pt on benefit of using walker over SPC with back pain.    Extremity/Trunk Assessments:  RLE   RLE : Within Functional Limits  LLE   LLE : Within Functional Limits    Outcome Measures:  [facility] Basic Mobility  Turning from your back to your side while in a flat bed without using bedrails: A little  Moving from lying on your back to sitting on the side of a flat bed without using bedrails: A little  Moving to and from bed to chair (including a wheelchair): A little  Standing up from a chair using your arms (e.g. wheelchair or bedside chair): A little  To walk in hospital room: A little  Climbing 3-5 steps with railing: A little  Basic Mobility - Total Score: 18    Goals:  Encounter Problems       Encounter Problems (Active)       PT Problem       Pt will demonstrate IND with bed mobility to edge of bed.         Start:  04/16/24    Expected End:  04/30/24            Pt will demonstrate mod I  with sit to stand/chair transfers with FWW.         Start:  04/16/24    Expected End:  04/30/24            Pt will ambulate 100ft feet with FWW mod I .         Start:  04/16/24    Expected End:  04/30/24                 Education Documentation  Mobility Training, taught by Rory Pittman, PT at 4/16/2024 12:10 PM.  Learner: Patient  Readiness: Acceptance  Method: Explanation  Response: Verbalizes Understanding    Education Comments  No comments found.

## 2024-04-17 LAB
GLUCOSE BLD MANUAL STRIP-MCNC: 244 MG/DL (ref 74–99)
GLUCOSE BLD MANUAL STRIP-MCNC: 251 MG/DL (ref 74–99)
GLUCOSE BLD MANUAL STRIP-MCNC: 262 MG/DL (ref 74–99)
GLUCOSE BLD MANUAL STRIP-MCNC: 271 MG/DL (ref 74–99)

## 2024-04-17 PROCEDURE — 2500000005 HC RX 250 GENERAL PHARMACY W/O HCPCS: Performed by: STUDENT IN AN ORGANIZED HEALTH CARE EDUCATION/TRAINING PROGRAM

## 2024-04-17 PROCEDURE — 2500000001 HC RX 250 WO HCPCS SELF ADMINISTERED DRUGS (ALT 637 FOR MEDICARE OP): Performed by: REGISTERED NURSE

## 2024-04-17 PROCEDURE — 1210000001 HC SEMI-PRIVATE ROOM DAILY

## 2024-04-17 PROCEDURE — 2500000002 HC RX 250 W HCPCS SELF ADMINISTERED DRUGS (ALT 637 FOR MEDICARE OP, ALT 636 FOR OP/ED): Performed by: STUDENT IN AN ORGANIZED HEALTH CARE EDUCATION/TRAINING PROGRAM

## 2024-04-17 PROCEDURE — 99232 SBSQ HOSP IP/OBS MODERATE 35: CPT | Performed by: STUDENT IN AN ORGANIZED HEALTH CARE EDUCATION/TRAINING PROGRAM

## 2024-04-17 PROCEDURE — 2500000001 HC RX 250 WO HCPCS SELF ADMINISTERED DRUGS (ALT 637 FOR MEDICARE OP): Performed by: STUDENT IN AN ORGANIZED HEALTH CARE EDUCATION/TRAINING PROGRAM

## 2024-04-17 PROCEDURE — 82947 ASSAY GLUCOSE BLOOD QUANT: CPT

## 2024-04-17 PROCEDURE — 2500000004 HC RX 250 GENERAL PHARMACY W/ HCPCS (ALT 636 FOR OP/ED): Performed by: ANESTHESIOLOGY

## 2024-04-17 PROCEDURE — 2500000004 HC RX 250 GENERAL PHARMACY W/ HCPCS (ALT 636 FOR OP/ED): Performed by: STUDENT IN AN ORGANIZED HEALTH CARE EDUCATION/TRAINING PROGRAM

## 2024-04-17 RX ORDER — HYDROMORPHONE HYDROCHLORIDE 1 MG/ML
1 INJECTION, SOLUTION INTRAMUSCULAR; INTRAVENOUS; SUBCUTANEOUS EVERY 6 HOURS PRN
Status: DISCONTINUED | OUTPATIENT
Start: 2024-04-17 | End: 2024-04-18 | Stop reason: HOSPADM

## 2024-04-17 RX ADMIN — INSULIN LISPRO 26 UNITS: 100 INJECTION, SOLUTION INTRAVENOUS; SUBCUTANEOUS at 11:48

## 2024-04-17 RX ADMIN — INSULIN LISPRO 6 UNITS: 100 INJECTION, SOLUTION INTRAVENOUS; SUBCUTANEOUS at 11:49

## 2024-04-17 RX ADMIN — INSULIN GLARGINE 68 UNITS: 100 INJECTION, SOLUTION SUBCUTANEOUS at 22:01

## 2024-04-17 RX ADMIN — ENOXAPARIN SODIUM 40 MG: 40 INJECTION SUBCUTANEOUS at 08:37

## 2024-04-17 RX ADMIN — CEPHALEXIN 500 MG: 500 CAPSULE ORAL at 15:46

## 2024-04-17 RX ADMIN — ACETAMINOPHEN 650 MG: 325 TABLET ORAL at 11:51

## 2024-04-17 RX ADMIN — FENOFIBRATE 108 MG: 54 TABLET ORAL at 20:24

## 2024-04-17 RX ADMIN — CARVEDILOL 6.25 MG: 6.25 TABLET, FILM COATED ORAL at 16:00

## 2024-04-17 RX ADMIN — LOSARTAN POTASSIUM 100 MG: 100 TABLET, FILM COATED ORAL at 08:36

## 2024-04-17 RX ADMIN — INSULIN LISPRO 6 UNITS: 100 INJECTION, SOLUTION INTRAVENOUS; SUBCUTANEOUS at 08:39

## 2024-04-17 RX ADMIN — CEPHALEXIN 500 MG: 500 CAPSULE ORAL at 08:37

## 2024-04-17 RX ADMIN — AMLODIPINE BESYLATE 10 MG: 5 TABLET ORAL at 08:35

## 2024-04-17 RX ADMIN — OXYCODONE HYDROCHLORIDE AND ACETAMINOPHEN 1 TABLET: 5; 325 TABLET ORAL at 22:00

## 2024-04-17 RX ADMIN — HYDROMORPHONE HYDROCHLORIDE 1 MG: 1 INJECTION, SOLUTION INTRAMUSCULAR; INTRAVENOUS; SUBCUTANEOUS at 17:42

## 2024-04-17 RX ADMIN — ENOXAPARIN SODIUM 40 MG: 40 INJECTION SUBCUTANEOUS at 20:24

## 2024-04-17 RX ADMIN — ATORVASTATIN CALCIUM 20 MG: 20 TABLET, FILM COATED ORAL at 20:24

## 2024-04-17 RX ADMIN — INSULIN LISPRO 4 UNITS: 100 INJECTION, SOLUTION INTRAVENOUS; SUBCUTANEOUS at 16:03

## 2024-04-17 RX ADMIN — GABAPENTIN 300 MG: 300 CAPSULE ORAL at 20:24

## 2024-04-17 RX ADMIN — PANTOPRAZOLE SODIUM 40 MG: 40 TABLET, DELAYED RELEASE ORAL at 05:19

## 2024-04-17 RX ADMIN — HYDROMORPHONE HYDROCHLORIDE 1 MG: 1 INJECTION, SOLUTION INTRAMUSCULAR; INTRAVENOUS; SUBCUTANEOUS at 23:39

## 2024-04-17 RX ADMIN — OXYCODONE HYDROCHLORIDE AND ACETAMINOPHEN 1 TABLET: 5; 325 TABLET ORAL at 16:02

## 2024-04-17 RX ADMIN — INSULIN LISPRO 26 UNITS: 100 INJECTION, SOLUTION INTRAVENOUS; SUBCUTANEOUS at 08:38

## 2024-04-17 RX ADMIN — INSULIN LISPRO 26 UNITS: 100 INJECTION, SOLUTION INTRAVENOUS; SUBCUTANEOUS at 16:04

## 2024-04-17 RX ADMIN — HYDROMORPHONE HYDROCHLORIDE 1 MG: 1 INJECTION, SOLUTION INTRAMUSCULAR; INTRAVENOUS; SUBCUTANEOUS at 01:37

## 2024-04-17 RX ADMIN — CARVEDILOL 6.25 MG: 6.25 TABLET, FILM COATED ORAL at 08:37

## 2024-04-17 RX ADMIN — HYDROMORPHONE HYDROCHLORIDE 1 MG: 1 INJECTION, SOLUTION INTRAMUSCULAR; INTRAVENOUS; SUBCUTANEOUS at 09:00

## 2024-04-17 RX ADMIN — ACETAMINOPHEN 650 MG: 325 TABLET ORAL at 05:18

## 2024-04-17 RX ADMIN — ASPIRIN 81 MG: 81 TABLET, CHEWABLE ORAL at 08:36

## 2024-04-17 RX ADMIN — CEPHALEXIN 500 MG: 500 CAPSULE ORAL at 20:24

## 2024-04-17 RX ADMIN — INSULIN GLARGINE 68 UNITS: 100 INJECTION, SOLUTION SUBCUTANEOUS at 11:48

## 2024-04-17 RX ADMIN — METHYLPREDNISOLONE 20 MG: 4 TABLET ORAL at 08:36

## 2024-04-17 RX ADMIN — CEPHALEXIN 500 MG: 500 CAPSULE ORAL at 03:05

## 2024-04-17 RX ADMIN — ACETAMINOPHEN 650 MG: 325 TABLET ORAL at 22:00

## 2024-04-17 RX ADMIN — LIDOCAINE 4% 2 PATCH: 40 PATCH TOPICAL at 08:37

## 2024-04-17 ASSESSMENT — COGNITIVE AND FUNCTIONAL STATUS - GENERAL
DAILY ACTIVITIY SCORE: 24
MOBILITY SCORE: 24

## 2024-04-17 ASSESSMENT — PAIN SCALES - GENERAL
PAINLEVEL_OUTOF10: 8
PAINLEVEL_OUTOF10: 9
PAINLEVEL_OUTOF10: 8
PAINLEVEL_OUTOF10: 0 - NO PAIN

## 2024-04-17 ASSESSMENT — PAIN - FUNCTIONAL ASSESSMENT
PAIN_FUNCTIONAL_ASSESSMENT: 0-10
PAIN_FUNCTIONAL_ASSESSMENT: 0-10

## 2024-04-17 ASSESSMENT — PAIN SCALES - WONG BAKER: WONGBAKER_NUMERICALRESPONSE: HURTS WHOLE LOT

## 2024-04-17 NOTE — PROGRESS NOTES
"Daily progress note     Gary Sharma is 63 y.o. male with a history of poorly controlled type 2 diabetes, hypertension, degenerative osteoarthritis, hyperlipidemia, DIAZ cirrhosis, renal carcinoma presenting with intractable back pain.  Orthopedics consulted, patient does not have saddle anesthesia or loss of bowel or bladder function or urinary retention, MRI of lumbar spine was done and showed multilevel degenerative disc disease and facet arthrosis pronounced at L4-L5 with moderate to severe spinal canal stenosis,     Subjective  Patient was seen and examined at bedside  Patient reports this that he has severe pain over right lower extremity, especially with movement  No sensory loss send no motor weakness  Intact bowel bladder function    Vital signs in last 24 hours:  Temp:  [35.6 °C (96.1 °F)-36.1 °C (97 °F)] 36.1 °C (97 °F)  Heart Rate:  [77-88] 78  Resp:  [18] 18  BP: (139-162)/(74-88) 139/76  /76 (BP Location: Right arm, Patient Position: Sitting)   Pulse 78   Temp 36.1 °C (97 °F) (Temporal)   Resp 18   Ht 1.778 m (5' 10\")   Wt 127 kg (280 lb)   SpO2 94%   BMI 40.18 kg/m²    Intake/Output last 3 shifts:  I/O last 3 completed shifts:  In: 800 (6.3 mL/kg) [P.O.:800]  Out: - (0 mL/kg)   Weight: 127 kg   Intake/Output this shift:  I/O this shift:  In: 710 [P.O.:710]  Out: -     Physical Exam  Constitutional:       General: He is not in acute distress.     Appearance: Normal appearance. He is not ill-appearing, toxic-appearing or diaphoretic.   HENT:      Head: Normocephalic and atraumatic.      Mouth/Throat:      Mouth: Mucous membranes are moist.      Pharynx: No oropharyngeal exudate.   Eyes:      Extraocular Movements: Extraocular movements intact.      Pupils: Pupils are equal, round, and reactive to light.   Cardiovascular:      Rate and Rhythm: Normal rate and regular rhythm.      Heart sounds: No murmur heard.  Pulmonary:      Effort: Pulmonary effort is normal. No respiratory distress. "      Breath sounds: Normal breath sounds. No wheezing.   Abdominal:      General: Abdomen is flat. Bowel sounds are normal. There is no distension.      Tenderness: There is no abdominal tenderness. There is no guarding or rebound.   Musculoskeletal:         General: No swelling.      Right lower leg: No edema.      Left lower leg: No edema.   Skin:     Findings: No lesion or rash.   Neurological:      General: No focal deficit present.      Mental Status: He is alert and oriented to person, place, and time.      Cranial Nerves: No cranial nerve deficit.      Motor: No weakness.   Psychiatric:         Mood and Affect: Mood normal.         Behavior: Behavior normal.         Current medication   Current Facility-Administered Medications   Medication Dose Route Frequency Provider Last Rate Last Admin    acetaminophen (Tylenol) tablet 650 mg  650 mg oral q4h PRN Tyrone Pang MD        Or    acetaminophen (Tylenol) oral liquid 650 mg  650 mg nasogastric tube q4h PRN Tyrone Pang MD        Or    acetaminophen (Tylenol) suppository 650 mg  650 mg rectal q4h PRN Tyrone Pang MD        acetaminophen (Tylenol) tablet 650 mg  650 mg oral q6h Tyrone Pang MD   650 mg at 04/17/24 1151    amLODIPine (Norvasc) tablet 10 mg  10 mg oral Daily Tyrone Pang MD   10 mg at 04/17/24 0835    aspirin chewable tablet 81 mg  81 mg oral Daily SARAH Augustin   81 mg at 04/17/24 0836    atorvastatin (Lipitor) tablet 20 mg  20 mg oral Nightly Tyrone Pang MD   20 mg at 04/16/24 2047    carvedilol (Coreg) tablet 6.25 mg  6.25 mg oral BID with meals Tyrone Pang MD   6.25 mg at 04/17/24 0837    cephalexin (Keflex) capsule 500 mg  500 mg oral q6h [facility] MADHAV AugustinCNP   500 mg at 04/17/24 0837    dextrose 50 % injection 12.5 g  12.5 g intravenous q15 min PRN Tyrone Pang MD        dextrose 50 % injection 25 g  25 g intravenous q15 min PRN Tyrone Pang MD        enoxaparin  (Lovenox) syringe 40 mg  40 mg subcutaneous q12h [facility] Tyrone Pang MD   40 mg at 04/17/24 0837    fenofibrate (Tricor) tablet 108 mg  108 mg oral Nightly SARAH Augustin   108 mg at 04/16/24 2047    gabapentin (Neurontin) capsule 300 mg  300 mg oral Nightly SRAAH Augustin   300 mg at 04/16/24 2047    glucagon (Glucagen) injection 1 mg  1 mg intramuscular q15 min PRN Tyrone Pang MD        glucagon (Glucagen) injection 1 mg  1 mg intramuscular q15 min PRN Tyrone Pang MD        HYDROmorphone (Dilaudid) injection 1 mg  1 mg intravenous q3h PRN Lisbet Miller MD   1 mg at 04/17/24 0900    insulin glargine (Lantus) injection 68 Units  68 Units subcutaneous q12h Tyrone Pang MD   68 Units at 04/17/24 1148    insulin lispro (HumaLOG) injection 0-10 Units  0-10 Units subcutaneous TID with meals SARAH Augustin   6 Units at 04/17/24 1149    insulin lispro (HumaLOG) injection 26 Units  26 Units subcutaneous TID with meals Tyrone Pang MD   26 Units at 04/17/24 1148    lidocaine 4 % patch 2 patch  2 patch transdermal Daily Tyrone Pang MD   2 patch at 04/17/24 0837    losartan (Cozaar) tablet 100 mg  100 mg oral Daily Tyrone Pang MD   100 mg at 04/17/24 0836    melatonin tablet 3 mg  3 mg oral Nightly PRN Tyrone Pang MD   3 mg at 04/16/24 2047    [START ON 4/18/2024] methylPREDNISolone (Medrol) tablet 16 mg  16 mg oral Once Lisbet Miller MD        Followed by    [START ON 4/19/2024] methylPREDNISolone (Medrol) tablet 12 mg  12 mg oral Once Lisbet Miller MD        Followed by    [START ON 4/20/2024] methylPREDNISolone (Medrol) tablet 8 mg  8 mg oral Once Lisbet Miller MD        Followed by    [START ON 4/21/2024] methylPREDNISolone (Medrol) tablet 4 mg  4 mg oral Once Lisbet Miller MD        ondansetron (Zofran) injection 4 mg  4 mg intravenous q4h PRN Tyrone Pang MD        oxyCODONE-acetaminophen (Percocet) 5-325 mg per tablet 1 tablet   1 tablet oral q6h PRN Ekaterina Rooney MD   1 tablet at 04/16/24 1936    pantoprazole (ProtoNix) EC tablet 40 mg  40 mg oral Daily before breakfast Tyrone Pang MD   40 mg at 04/17/24 0519    polyethylene glycol (Glycolax, Miralax) packet 17 g  17 g oral Daily Tyrone Pang MD   17 g at 04/16/24 0934        Labs  Lab Results   Component Value Date    WBC 7.7 04/16/2024    HGB 11.8 (L) 04/16/2024    HCT 35.0 (L) 04/16/2024    MCV 90 04/16/2024     04/16/2024     Lab Results   Component Value Date    HGBA1C 9.9 (H) 03/08/2024     Lab Results   Component Value Date    GLUCOSE 220 (H) 04/16/2024    CALCIUM 9.0 04/16/2024     (L) 04/16/2024    K 4.3 04/16/2024    CO2 28 04/16/2024    CL 98 04/16/2024    BUN 31 (H) 04/16/2024    CREATININE 1.15 04/16/2024           Principal Problem:    Intractable back pain      Assessment and Plan   #Lumbar spinal stenosis L4-L5  #Lumbar radiculopathy  #Multilevel lumbar degenerative diseases    -Thoracic MRI showing multilevel degenerative changes without definite spinal cord signal abnormality  Lumbar MRI was done and showed multilevel degenerative disc disease and facet arthrosis pronounced at L4-L5 with moderate to severe spinal canal stenosis,  Was also evaluated by orthopedics, orthopedics signed off no intervention  Pain management on board  Patient started on steroids p.o.  Continue tapering dose of steroid and pain management with Dilaudid and Percocet  PT/OT        #.  Type 2 diabetes  -Poorly controlled, A1c 9.9%  Continue Lantus 68 units twice daily, Humalog 26 units with meals  -Carb controlled diet  -Hypoglycemic protocol     #.  Hypertension  #.  HLD  Continue     #.  DIAZ cirrhosis  -Stable,      VTE PPX: Lovenox    04/7/2024  Patient was seen and examined at bedside  Patient still reported significant pain  Dilaudid is for breakthrough with oxycodone every 6 hours  If patient's pain is better controlled tomorrow discharge home with outpatient  follow-up with orthopedics   LOS: 1 day

## 2024-04-17 NOTE — TELEPHONE ENCOUNTER
Spoke with wife, she was made aware. She is hoping he will be released today and will keep his appointment next week.

## 2024-04-17 NOTE — CARE PLAN
The patient's goals for the shift include      The clinical goals for the shift include pain management    Problem: Pain  Goal: My pain/discomfort is manageable  Outcome: Progressing     Problem: Safety  Goal: Patient will be injury free during hospitalization  Outcome: Progressing  Goal: I will remain free of falls  Outcome: Progressing     Problem: Daily Care  Goal: Daily care needs are met  Outcome: Progressing     Problem: Psychosocial Needs  Goal: Demonstrates ability to cope with hospitalization/illness  Outcome: Progressing  Goal: Collaborate with me, my family, and caregiver to identify my specific goals  Outcome: Progressing

## 2024-04-18 ENCOUNTER — OFFICE VISIT (OUTPATIENT)
Dept: NEUROSURGERY | Age: 64
End: 2024-04-18
Payer: MEDICARE

## 2024-04-18 ENCOUNTER — HOSPITAL ENCOUNTER (INPATIENT)
Age: 64
LOS: 5 days | Discharge: HOME | DRG: 519 | End: 2024-04-23
Attending: FAMILY MEDICINE | Admitting: FAMILY MEDICINE
Payer: MEDICARE

## 2024-04-18 VITALS
TEMPERATURE: 98.2 F | WEIGHT: 280 LBS | DIASTOLIC BLOOD PRESSURE: 62 MMHG | SYSTOLIC BLOOD PRESSURE: 128 MMHG | HEIGHT: 70 IN | BODY MASS INDEX: 40.09 KG/M2

## 2024-04-18 VITALS
RESPIRATION RATE: 16 BRPM | HEIGHT: 70 IN | HEART RATE: 65 BPM | OXYGEN SATURATION: 96 % | SYSTOLIC BLOOD PRESSURE: 124 MMHG | WEIGHT: 280 LBS | DIASTOLIC BLOOD PRESSURE: 58 MMHG | BODY MASS INDEX: 40.09 KG/M2 | TEMPERATURE: 96.8 F

## 2024-04-18 DIAGNOSIS — E66.01 CLASS 3 SEVERE OBESITY DUE TO EXCESS CALORIES WITH SERIOUS COMORBIDITY AND BODY MASS INDEX (BMI) OF 40.0 TO 44.9 IN ADULT (HCC): ICD-10-CM

## 2024-04-18 DIAGNOSIS — E11.9 DIABETES MELLITUS TYPE 2, INSULIN DEPENDENT (HCC): ICD-10-CM

## 2024-04-18 DIAGNOSIS — G99.2 MYELOPATHY CONCURRENT WITH AND DUE TO STENOSIS OF LUMBAR SPINE (HCC): Primary | ICD-10-CM

## 2024-04-18 DIAGNOSIS — R26.2 UNABLE TO AMBULATE: ICD-10-CM

## 2024-04-18 DIAGNOSIS — M48.062 SPINAL STENOSIS OF LUMBAR REGION WITH NEUROGENIC CLAUDICATION: Primary | ICD-10-CM

## 2024-04-18 DIAGNOSIS — Z79.4 DIABETES MELLITUS TYPE 2, INSULIN DEPENDENT (HCC): ICD-10-CM

## 2024-04-18 DIAGNOSIS — M54.16 RIGHT LUMBAR RADICULITIS: ICD-10-CM

## 2024-04-18 DIAGNOSIS — M48.061 MYELOPATHY CONCURRENT WITH AND DUE TO STENOSIS OF LUMBAR SPINE (HCC): Primary | ICD-10-CM

## 2024-04-18 DIAGNOSIS — I10 PRIMARY HYPERTENSION: ICD-10-CM

## 2024-04-18 DIAGNOSIS — G89.18 POST-OP PAIN: ICD-10-CM

## 2024-04-18 PROBLEM — E66.813 CLASS 3 SEVERE OBESITY DUE TO EXCESS CALORIES WITH SERIOUS COMORBIDITY AND BODY MASS INDEX (BMI) OF 40.0 TO 44.9 IN ADULT (HCC): Status: ACTIVE | Noted: 2024-04-18

## 2024-04-18 LAB
ALBUMIN SERPL-MCNC: 4.6 G/DL (ref 3.5–4.6)
ALP SERPL-CCNC: 49 U/L (ref 35–104)
ALT SERPL-CCNC: 76 U/L (ref 0–41)
ANION GAP SERPL CALCULATED.3IONS-SCNC: 13 MEQ/L (ref 9–15)
APTT PPP: 32.7 SEC (ref 24.4–36.8)
AST SERPL-CCNC: 94 U/L (ref 0–40)
BASOPHILS # BLD: 0 K/UL (ref 0–0.2)
BASOPHILS NFR BLD: 0.4 %
BILIRUB SERPL-MCNC: 0.8 MG/DL (ref 0.2–0.7)
BUN SERPL-MCNC: 21 MG/DL (ref 8–23)
CALCIUM SERPL-MCNC: 9.9 MG/DL (ref 8.5–9.9)
CHLORIDE SERPL-SCNC: 94 MEQ/L (ref 95–107)
CO2 SERPL-SCNC: 25 MEQ/L (ref 20–31)
CREAT SERPL-MCNC: 0.82 MG/DL (ref 0.7–1.2)
EOSINOPHIL # BLD: 0 K/UL (ref 0–0.7)
EOSINOPHIL NFR BLD: 0.3 %
ERYTHROCYTE [DISTWIDTH] IN BLOOD BY AUTOMATED COUNT: 13.1 % (ref 11.5–14.5)
GLOBULIN SER CALC-MCNC: 4.4 G/DL (ref 2.3–3.5)
GLUCOSE BLD MANUAL STRIP-MCNC: 142 MG/DL (ref 74–99)
GLUCOSE BLD MANUAL STRIP-MCNC: 181 MG/DL (ref 74–99)
GLUCOSE BLD-MCNC: 224 MG/DL (ref 70–99)
GLUCOSE SERPL-MCNC: 261 MG/DL (ref 70–99)
HCT VFR BLD AUTO: 40 % (ref 42–52)
HGB BLD-MCNC: 13.8 G/DL (ref 14–18)
INR PPP: 1.1
LYMPHOCYTES # BLD: 1.8 K/UL (ref 1–4.8)
LYMPHOCYTES NFR BLD: 25 %
MCH RBC QN AUTO: 30 PG (ref 27–31.3)
MCHC RBC AUTO-ENTMCNC: 34.5 % (ref 33–37)
MCV RBC AUTO: 87 FL (ref 79–92.2)
MONOCYTES # BLD: 0.3 K/UL (ref 0.2–0.8)
MONOCYTES NFR BLD: 4.6 %
NEUTROPHILS # BLD: 4.9 K/UL (ref 1.4–6.5)
NEUTS SEG NFR BLD: 69.1 %
PERFORMED ON: ABNORMAL
PLATELET # BLD AUTO: 230 K/UL (ref 130–400)
POTASSIUM SERPL-SCNC: 4.5 MEQ/L (ref 3.4–4.9)
PROT SERPL-MCNC: 9 G/DL (ref 6.3–8)
PROTHROMBIN TIME: 14.8 SEC (ref 12.3–14.9)
RBC # BLD AUTO: 4.6 M/UL (ref 4.7–6.1)
SODIUM SERPL-SCNC: 132 MEQ/L (ref 135–144)
WBC # BLD AUTO: 7.1 K/UL (ref 4.8–10.8)

## 2024-04-18 PROCEDURE — 99285 EMERGENCY DEPT VISIT HI MDM: CPT

## 2024-04-18 PROCEDURE — G8427 DOCREV CUR MEDS BY ELIG CLIN: HCPCS | Performed by: NEUROLOGICAL SURGERY

## 2024-04-18 PROCEDURE — 1210000000 HC MED SURG R&B

## 2024-04-18 PROCEDURE — 36415 COLL VENOUS BLD VENIPUNCTURE: CPT

## 2024-04-18 PROCEDURE — 85025 COMPLETE CBC W/AUTO DIFF WBC: CPT

## 2024-04-18 PROCEDURE — 2500000001 HC RX 250 WO HCPCS SELF ADMINISTERED DRUGS (ALT 637 FOR MEDICARE OP): Performed by: REGISTERED NURSE

## 2024-04-18 PROCEDURE — 3078F DIAST BP <80 MM HG: CPT | Performed by: NEUROLOGICAL SURGERY

## 2024-04-18 PROCEDURE — 3017F COLORECTAL CA SCREEN DOC REV: CPT | Performed by: NEUROLOGICAL SURGERY

## 2024-04-18 PROCEDURE — 96374 THER/PROPH/DIAG INJ IV PUSH: CPT

## 2024-04-18 PROCEDURE — 2500000004 HC RX 250 GENERAL PHARMACY W/ HCPCS (ALT 636 FOR OP/ED): Performed by: STUDENT IN AN ORGANIZED HEALTH CARE EDUCATION/TRAINING PROGRAM

## 2024-04-18 PROCEDURE — 2580000003 HC RX 258

## 2024-04-18 PROCEDURE — 82947 ASSAY GLUCOSE BLOOD QUANT: CPT

## 2024-04-18 PROCEDURE — 6370000000 HC RX 637 (ALT 250 FOR IP)

## 2024-04-18 PROCEDURE — 2500000001 HC RX 250 WO HCPCS SELF ADMINISTERED DRUGS (ALT 637 FOR MEDICARE OP): Performed by: STUDENT IN AN ORGANIZED HEALTH CARE EDUCATION/TRAINING PROGRAM

## 2024-04-18 PROCEDURE — 96375 TX/PRO/DX INJ NEW DRUG ADDON: CPT

## 2024-04-18 PROCEDURE — 80053 COMPREHEN METABOLIC PANEL: CPT

## 2024-04-18 PROCEDURE — 97116 GAIT TRAINING THERAPY: CPT | Mod: GP,CQ

## 2024-04-18 PROCEDURE — 97530 THERAPEUTIC ACTIVITIES: CPT | Mod: GP,CQ

## 2024-04-18 PROCEDURE — 2500000005 HC RX 250 GENERAL PHARMACY W/O HCPCS: Performed by: STUDENT IN AN ORGANIZED HEALTH CARE EDUCATION/TRAINING PROGRAM

## 2024-04-18 PROCEDURE — 3046F HEMOGLOBIN A1C LEVEL >9.0%: CPT | Performed by: NEUROLOGICAL SURGERY

## 2024-04-18 PROCEDURE — 85730 THROMBOPLASTIN TIME PARTIAL: CPT

## 2024-04-18 PROCEDURE — 6360000002 HC RX W HCPCS: Performed by: PHYSICIAN ASSISTANT

## 2024-04-18 PROCEDURE — 2500000002 HC RX 250 W HCPCS SELF ADMINISTERED DRUGS (ALT 637 FOR MEDICARE OP, ALT 636 FOR OP/ED): Performed by: STUDENT IN AN ORGANIZED HEALTH CARE EDUCATION/TRAINING PROGRAM

## 2024-04-18 PROCEDURE — 6360000002 HC RX W HCPCS

## 2024-04-18 PROCEDURE — 2022F DILAT RTA XM EVC RTNOPTHY: CPT | Performed by: NEUROLOGICAL SURGERY

## 2024-04-18 PROCEDURE — 2500000004 HC RX 250 GENERAL PHARMACY W/ HCPCS (ALT 636 FOR OP/ED): Performed by: ANESTHESIOLOGY

## 2024-04-18 PROCEDURE — 3074F SYST BP LT 130 MM HG: CPT | Performed by: NEUROLOGICAL SURGERY

## 2024-04-18 PROCEDURE — 99239 HOSP IP/OBS DSCHRG MGMT >30: CPT | Performed by: STUDENT IN AN ORGANIZED HEALTH CARE EDUCATION/TRAINING PROGRAM

## 2024-04-18 PROCEDURE — 6360000002 HC RX W HCPCS: Performed by: FAMILY MEDICINE

## 2024-04-18 PROCEDURE — 99204 OFFICE O/P NEW MOD 45 MIN: CPT | Performed by: NEUROLOGICAL SURGERY

## 2024-04-18 PROCEDURE — 85610 PROTHROMBIN TIME: CPT

## 2024-04-18 PROCEDURE — 6370000000 HC RX 637 (ALT 250 FOR IP): Performed by: INTERNAL MEDICINE

## 2024-04-18 PROCEDURE — 4004F PT TOBACCO SCREEN RCVD TLK: CPT | Performed by: NEUROLOGICAL SURGERY

## 2024-04-18 PROCEDURE — G8417 CALC BMI ABV UP PARAM F/U: HCPCS | Performed by: NEUROLOGICAL SURGERY

## 2024-04-18 RX ORDER — INSULIN LISPRO 100 [IU]/ML
0-8 INJECTION, SOLUTION INTRAVENOUS; SUBCUTANEOUS
Status: DISCONTINUED | OUTPATIENT
Start: 2024-04-18 | End: 2024-04-18

## 2024-04-18 RX ORDER — PANTOPRAZOLE SODIUM 40 MG/1
40 TABLET, DELAYED RELEASE ORAL
Status: DISCONTINUED | OUTPATIENT
Start: 2024-04-19 | End: 2024-04-23 | Stop reason: HOSPADM

## 2024-04-18 RX ORDER — POTASSIUM CHLORIDE 20 MEQ/1
40 TABLET, EXTENDED RELEASE ORAL PRN
Status: DISCONTINUED | OUTPATIENT
Start: 2024-04-18 | End: 2024-04-23 | Stop reason: HOSPADM

## 2024-04-18 RX ORDER — LOSARTAN POTASSIUM 100 MG/1
100 TABLET ORAL
Status: ON HOLD | COMMUNITY
Start: 2024-01-02

## 2024-04-18 RX ORDER — CARVEDILOL 6.25 MG/1
6.25 TABLET ORAL 2 TIMES DAILY
Status: ON HOLD | COMMUNITY
Start: 2024-04-04

## 2024-04-18 RX ORDER — GABAPENTIN 300 MG/1
300 CAPSULE ORAL
Status: DISCONTINUED | OUTPATIENT
Start: 2024-04-18 | End: 2024-04-19

## 2024-04-18 RX ORDER — MAGNESIUM SULFATE IN WATER 40 MG/ML
2000 INJECTION, SOLUTION INTRAVENOUS PRN
Status: DISCONTINUED | OUTPATIENT
Start: 2024-04-18 | End: 2024-04-23 | Stop reason: HOSPADM

## 2024-04-18 RX ORDER — ACETAMINOPHEN 325 MG/1
650 TABLET ORAL EVERY 6 HOURS PRN
Status: DISCONTINUED | OUTPATIENT
Start: 2024-04-18 | End: 2024-04-23 | Stop reason: HOSPADM

## 2024-04-18 RX ORDER — INSULIN GLARGINE 100 [IU]/ML
30 INJECTION, SOLUTION SUBCUTANEOUS 2 TIMES DAILY
Status: DISCONTINUED | OUTPATIENT
Start: 2024-04-18 | End: 2024-04-19

## 2024-04-18 RX ORDER — HYDROCHLOROTHIAZIDE 25 MG/1
25 TABLET ORAL
Status: ON HOLD | COMMUNITY
Start: 2024-01-26

## 2024-04-18 RX ORDER — INSULIN LISPRO 100 [IU]/ML
0-16 INJECTION, SOLUTION INTRAVENOUS; SUBCUTANEOUS
Status: DISCONTINUED | OUTPATIENT
Start: 2024-04-18 | End: 2024-04-23 | Stop reason: HOSPADM

## 2024-04-18 RX ORDER — METHYLPREDNISOLONE 4 MG/1
TABLET ORAL
Status: ON HOLD | COMMUNITY
Start: 2024-04-18 | End: 2024-04-18

## 2024-04-18 RX ORDER — DEXTROSE MONOHYDRATE 100 MG/ML
INJECTION, SOLUTION INTRAVENOUS CONTINUOUS PRN
Status: DISCONTINUED | OUTPATIENT
Start: 2024-04-18 | End: 2024-04-23 | Stop reason: HOSPADM

## 2024-04-18 RX ORDER — ONDANSETRON 2 MG/ML
4 INJECTION INTRAMUSCULAR; INTRAVENOUS ONCE
Status: COMPLETED | OUTPATIENT
Start: 2024-04-18 | End: 2024-04-18

## 2024-04-18 RX ORDER — SODIUM CHLORIDE 9 MG/ML
INJECTION, SOLUTION INTRAVENOUS PRN
Status: DISCONTINUED | OUTPATIENT
Start: 2024-04-18 | End: 2024-04-22 | Stop reason: SDUPTHER

## 2024-04-18 RX ORDER — OMEPRAZOLE 40 MG/1
CAPSULE, DELAYED RELEASE ORAL DAILY
Status: ON HOLD | COMMUNITY
Start: 2024-03-05

## 2024-04-18 RX ORDER — LIDOCAINE 4 G/G
1 PATCH TOPICAL DAILY
Status: DISCONTINUED | OUTPATIENT
Start: 2024-04-19 | End: 2024-04-23 | Stop reason: HOSPADM

## 2024-04-18 RX ORDER — INSULIN LISPRO 200 [IU]/ML
35 INJECTION, SOLUTION SUBCUTANEOUS
Status: ON HOLD | COMMUNITY
Start: 2024-03-08

## 2024-04-18 RX ORDER — ACETAMINOPHEN 650 MG/1
650 SUPPOSITORY RECTAL EVERY 6 HOURS PRN
Status: DISCONTINUED | OUTPATIENT
Start: 2024-04-18 | End: 2024-04-19

## 2024-04-18 RX ORDER — SODIUM CHLORIDE 0.9 % (FLUSH) 0.9 %
5-40 SYRINGE (ML) INJECTION EVERY 12 HOURS SCHEDULED
Status: DISCONTINUED | OUTPATIENT
Start: 2024-04-18 | End: 2024-04-23 | Stop reason: HOSPADM

## 2024-04-18 RX ORDER — CYCLOBENZAPRINE HCL 10 MG
10 TABLET ORAL 2 TIMES DAILY PRN
Status: ON HOLD | COMMUNITY
Start: 2024-04-02

## 2024-04-18 RX ORDER — AMLODIPINE BESYLATE 10 MG/1
10 TABLET ORAL DAILY
Status: DISCONTINUED | OUTPATIENT
Start: 2024-04-19 | End: 2024-04-23 | Stop reason: HOSPADM

## 2024-04-18 RX ORDER — ONDANSETRON 4 MG/1
4 TABLET, ORALLY DISINTEGRATING ORAL EVERY 8 HOURS PRN
Status: DISCONTINUED | OUTPATIENT
Start: 2024-04-18 | End: 2024-04-23 | Stop reason: HOSPADM

## 2024-04-18 RX ORDER — FENTANYL CITRATE 0.05 MG/ML
50 INJECTION, SOLUTION INTRAMUSCULAR; INTRAVENOUS ONCE
Status: COMPLETED | OUTPATIENT
Start: 2024-04-18 | End: 2024-04-18

## 2024-04-18 RX ORDER — HYDROCODONE BITARTRATE AND ACETAMINOPHEN 5; 325 MG/1; MG/1
1 TABLET ORAL EVERY 6 HOURS PRN
Status: ON HOLD | COMMUNITY
Start: 2024-04-02

## 2024-04-18 RX ORDER — DEXAMETHASONE SODIUM PHOSPHATE 10 MG/ML
10 INJECTION, SOLUTION INTRAMUSCULAR; INTRAVENOUS ONCE
Status: COMPLETED | OUTPATIENT
Start: 2024-04-18 | End: 2024-04-18

## 2024-04-18 RX ORDER — POLYETHYLENE GLYCOL 3350 17 G/17G
17 POWDER, FOR SOLUTION ORAL DAILY PRN
Status: DISCONTINUED | OUTPATIENT
Start: 2024-04-18 | End: 2024-04-22 | Stop reason: SDUPTHER

## 2024-04-18 RX ORDER — AMLODIPINE BESYLATE 10 MG/1
10 TABLET ORAL
Status: ON HOLD | COMMUNITY
Start: 2024-01-02

## 2024-04-18 RX ORDER — POTASSIUM CHLORIDE 7.45 MG/ML
10 INJECTION INTRAVENOUS PRN
Status: DISCONTINUED | OUTPATIENT
Start: 2024-04-18 | End: 2024-04-23 | Stop reason: HOSPADM

## 2024-04-18 RX ORDER — ICOSAPENT ETHYL 1000 MG/1
2 CAPSULE ORAL 2 TIMES DAILY WITH MEALS
Status: ON HOLD | COMMUNITY
Start: 2015-10-19

## 2024-04-18 RX ORDER — HYDROMORPHONE HYDROCHLORIDE 1 MG/ML
1 INJECTION, SOLUTION INTRAMUSCULAR; INTRAVENOUS; SUBCUTANEOUS ONCE
Status: COMPLETED | OUTPATIENT
Start: 2024-04-18 | End: 2024-04-18

## 2024-04-18 RX ORDER — METHYLPREDNISOLONE 4 MG/1
TABLET ORAL
Qty: 21 TABLET | Refills: 0 | Status: SHIPPED | OUTPATIENT
Start: 2024-04-19 | End: 2024-04-21

## 2024-04-18 RX ORDER — GLUCAGON 1 MG/ML
1 KIT INJECTION PRN
Status: DISCONTINUED | OUTPATIENT
Start: 2024-04-18 | End: 2024-04-23 | Stop reason: HOSPADM

## 2024-04-18 RX ORDER — ONDANSETRON 2 MG/ML
4 INJECTION INTRAMUSCULAR; INTRAVENOUS EVERY 6 HOURS PRN
Status: DISCONTINUED | OUTPATIENT
Start: 2024-04-18 | End: 2024-04-23 | Stop reason: HOSPADM

## 2024-04-18 RX ORDER — ATORVASTATIN CALCIUM 20 MG/1
20 TABLET, FILM COATED ORAL
Status: ON HOLD | COMMUNITY
Start: 2015-10-01

## 2024-04-18 RX ORDER — INSULIN LISPRO 100 [IU]/ML
0-4 INJECTION, SOLUTION INTRAVENOUS; SUBCUTANEOUS NIGHTLY
Status: DISCONTINUED | OUTPATIENT
Start: 2024-04-18 | End: 2024-04-18

## 2024-04-18 RX ORDER — OXYCODONE AND ACETAMINOPHEN 5; 325 MG/1; MG/1
1 TABLET ORAL EVERY 6 HOURS PRN
Qty: 12 TABLET | Refills: 0 | Status: SHIPPED | OUTPATIENT
Start: 2024-04-18 | End: 2024-04-21

## 2024-04-18 RX ORDER — INSULIN GLARGINE 100 [IU]/ML
76 INJECTION, SOLUTION SUBCUTANEOUS 2 TIMES DAILY
Status: DISCONTINUED | OUTPATIENT
Start: 2024-04-18 | End: 2024-04-18

## 2024-04-18 RX ORDER — INSULIN LISPRO 100 [IU]/ML
0-4 INJECTION, SOLUTION INTRAVENOUS; SUBCUTANEOUS NIGHTLY
Status: DISCONTINUED | OUTPATIENT
Start: 2024-04-18 | End: 2024-04-23 | Stop reason: HOSPADM

## 2024-04-18 RX ORDER — CARVEDILOL 3.12 MG/1
6.25 TABLET ORAL 2 TIMES DAILY
Status: DISCONTINUED | OUTPATIENT
Start: 2024-04-18 | End: 2024-04-23 | Stop reason: HOSPADM

## 2024-04-18 RX ORDER — ENOXAPARIN SODIUM 100 MG/ML
30 INJECTION SUBCUTANEOUS 2 TIMES DAILY
Status: DISCONTINUED | OUTPATIENT
Start: 2024-04-18 | End: 2024-04-23 | Stop reason: HOSPADM

## 2024-04-18 RX ORDER — FENOFIBRATE 134 MG/1
1 CAPSULE ORAL NIGHTLY
Status: ON HOLD | COMMUNITY
Start: 2023-07-11

## 2024-04-18 RX ORDER — SODIUM CHLORIDE 0.9 % (FLUSH) 0.9 %
5-40 SYRINGE (ML) INJECTION PRN
Status: DISCONTINUED | OUTPATIENT
Start: 2024-04-18 | End: 2024-04-23 | Stop reason: HOSPADM

## 2024-04-18 RX ORDER — HYDROMORPHONE HYDROCHLORIDE 1 MG/ML
1 INJECTION, SOLUTION INTRAMUSCULAR; INTRAVENOUS; SUBCUTANEOUS
Status: DISCONTINUED | OUTPATIENT
Start: 2024-04-18 | End: 2024-04-22 | Stop reason: SDUPTHER

## 2024-04-18 RX ORDER — LIDOCAINE 50 MG/G
1 PATCH TOPICAL DAILY
Status: ON HOLD | COMMUNITY
Start: 2024-04-02

## 2024-04-18 RX ORDER — OXYCODONE HYDROCHLORIDE AND ACETAMINOPHEN 5; 325 MG/1; MG/1
TABLET ORAL
Status: ON HOLD | COMMUNITY
Start: 2024-04-07 | End: 2024-04-18

## 2024-04-18 RX ADMIN — HYDROMORPHONE HYDROCHLORIDE 1 MG: 1 INJECTION, SOLUTION INTRAMUSCULAR; INTRAVENOUS; SUBCUTANEOUS at 20:08

## 2024-04-18 RX ADMIN — INSULIN LISPRO 26 UNITS: 100 INJECTION, SOLUTION INTRAVENOUS; SUBCUTANEOUS at 07:47

## 2024-04-18 RX ADMIN — HYDROMORPHONE HYDROCHLORIDE 1 MG: 1 INJECTION, SOLUTION INTRAMUSCULAR; INTRAVENOUS; SUBCUTANEOUS at 23:08

## 2024-04-18 RX ADMIN — ENOXAPARIN SODIUM 30 MG: 100 INJECTION SUBCUTANEOUS at 23:05

## 2024-04-18 RX ADMIN — CEPHALEXIN 500 MG: 500 CAPSULE ORAL at 03:03

## 2024-04-18 RX ADMIN — HYDROMORPHONE HYDROCHLORIDE 1 MG: 1 INJECTION, SOLUTION INTRAMUSCULAR; INTRAVENOUS; SUBCUTANEOUS at 17:12

## 2024-04-18 RX ADMIN — OXYCODONE HYDROCHLORIDE AND ACETAMINOPHEN 1 TABLET: 5; 325 TABLET ORAL at 04:03

## 2024-04-18 RX ADMIN — PANTOPRAZOLE SODIUM 40 MG: 40 TABLET, DELAYED RELEASE ORAL at 05:03

## 2024-04-18 RX ADMIN — INSULIN LISPRO 2 UNITS: 100 INJECTION, SOLUTION INTRAVENOUS; SUBCUTANEOUS at 07:47

## 2024-04-18 RX ADMIN — GABAPENTIN 300 MG: 300 CAPSULE ORAL at 23:05

## 2024-04-18 RX ADMIN — ASPIRIN 81 MG: 81 TABLET, CHEWABLE ORAL at 08:31

## 2024-04-18 RX ADMIN — CARVEDILOL 6.25 MG: 6.25 TABLET, FILM COATED ORAL at 08:32

## 2024-04-18 RX ADMIN — CEPHALEXIN 500 MG: 500 CAPSULE ORAL at 08:32

## 2024-04-18 RX ADMIN — INSULIN GLARGINE 30 UNITS: 100 INJECTION, SOLUTION SUBCUTANEOUS at 23:06

## 2024-04-18 RX ADMIN — LOSARTAN POTASSIUM 100 MG: 100 TABLET, FILM COATED ORAL at 08:32

## 2024-04-18 RX ADMIN — HYDROMORPHONE HYDROCHLORIDE 1 MG: 1 INJECTION, SOLUTION INTRAMUSCULAR; INTRAVENOUS; SUBCUTANEOUS at 05:36

## 2024-04-18 RX ADMIN — DEXAMETHASONE SODIUM PHOSPHATE 10 MG: 10 INJECTION INTRAMUSCULAR; INTRAVENOUS at 17:12

## 2024-04-18 RX ADMIN — ENOXAPARIN SODIUM 40 MG: 40 INJECTION SUBCUTANEOUS at 08:32

## 2024-04-18 RX ADMIN — ONDANSETRON 4 MG: 2 INJECTION INTRAMUSCULAR; INTRAVENOUS at 17:12

## 2024-04-18 RX ADMIN — LIDOCAINE 4% 2 PATCH: 40 PATCH TOPICAL at 08:28

## 2024-04-18 RX ADMIN — OXYCODONE HYDROCHLORIDE AND ACETAMINOPHEN 1 TABLET: 5; 325 TABLET ORAL at 11:54

## 2024-04-18 RX ADMIN — FENTANYL CITRATE 50 MCG: 0.05 INJECTION, SOLUTION INTRAMUSCULAR; INTRAVENOUS at 18:26

## 2024-04-18 RX ADMIN — Medication 10 ML: at 23:06

## 2024-04-18 RX ADMIN — INSULIN GLARGINE 68 UNITS: 100 INJECTION, SOLUTION SUBCUTANEOUS at 11:54

## 2024-04-18 RX ADMIN — POLYETHYLENE GLYCOL 3350 17 G: 17 POWDER, FOR SOLUTION ORAL at 08:29

## 2024-04-18 RX ADMIN — METHYLPREDNISOLONE 16 MG: 4 TABLET ORAL at 08:30

## 2024-04-18 RX ADMIN — CARVEDILOL 6.25 MG: 3.12 TABLET, FILM COATED ORAL at 23:04

## 2024-04-18 RX ADMIN — AMLODIPINE BESYLATE 10 MG: 5 TABLET ORAL at 08:32

## 2024-04-18 ASSESSMENT — PAIN SCALES - GENERAL
PAINLEVEL_OUTOF10: 9
PAINLEVEL_OUTOF10: 7
PAINLEVEL_OUTOF10: 8
PAINLEVEL_OUTOF10: 5 - MODERATE PAIN
PAINLEVEL_OUTOF10: 10
PAINLEVEL_OUTOF10: 10
PAINLEVEL_OUTOF10: 8
PAINLEVEL_OUTOF10: 10

## 2024-04-18 ASSESSMENT — PAIN DESCRIPTION - ORIENTATION
ORIENTATION: RIGHT

## 2024-04-18 ASSESSMENT — COGNITIVE AND FUNCTIONAL STATUS - GENERAL
MOVING TO AND FROM BED TO CHAIR: A LITTLE
MOBILITY SCORE: 24
CLIMB 3 TO 5 STEPS WITH RAILING: A LOT
DAILY ACTIVITIY SCORE: 24
TURNING FROM BACK TO SIDE WHILE IN FLAT BAD: A LITTLE
WALKING IN HOSPITAL ROOM: A LITTLE
MOBILITY SCORE: 18
STANDING UP FROM CHAIR USING ARMS: A LITTLE

## 2024-04-18 ASSESSMENT — PAIN DESCRIPTION - LOCATION
LOCATION: HIP;LEG
LOCATION: BACK;LEG
LOCATION: LEG

## 2024-04-18 ASSESSMENT — PAIN - FUNCTIONAL ASSESSMENT
PAIN_FUNCTIONAL_ASSESSMENT: 0-10
PAIN_FUNCTIONAL_ASSESSMENT: PREVENTS OR INTERFERES SOME ACTIVE ACTIVITIES AND ADLS
PAIN_FUNCTIONAL_ASSESSMENT: 0-10
PAIN_FUNCTIONAL_ASSESSMENT: PREVENTS OR INTERFERES SOME ACTIVE ACTIVITIES AND ADLS

## 2024-04-18 ASSESSMENT — ENCOUNTER SYMPTOMS
BACK PAIN: 1
SHORTNESS OF BREATH: 0
EYE PAIN: 0
NAUSEA: 1

## 2024-04-18 ASSESSMENT — PAIN DESCRIPTION - DESCRIPTORS
DESCRIPTORS: ACHING
DESCRIPTORS: DISCOMFORT;GNAWING
DESCRIPTORS: ACHING;DISCOMFORT;SQUEEZING

## 2024-04-18 NOTE — PROGRESS NOTES
Patient Name: Will Bravo : 1960        Date: 2024      Type of Appt: Consult    Reason for appt: PSR: SEEN AT  ER, BACK PAIN AND RIGHT LEG PAIN. SEEN IN PAST BY DR. ARCHER AND DR. LIANG.     Referred by: PSR    Studies done: 23 MRI OF THORACIC SPINE AT   24 MRI OF LUMBAR SPINE AT   24 CT OF LUMBAR SPINE AT     Physical therapy: YES OR NO    Smoking:  No                        [facility]  [facility]  [address] , [address]  [place], [place]  P: [phone]  F: [phone]          Patient:  Will Bravo  YOB: 1960  Date: 2024    The patient is a 63 y.o. male who presents today for evaluation of the following problems:     Chief Complaint   Patient presents with    New Patient    Back Pain    Leg Pain        Referred by No ref. provider found      PAST MEDICAL, FAMILY AND SOCIAL HISTORY:  Past Medical History:   Diagnosis Date    Hypertension     Pancreatitis     Type II or unspecified type diabetes mellitus without mention of complication, not stated as uncontrolled      Past Surgical History:   Procedure Laterality Date    CT CRYO ABLATION RENAL TUMOR  2019    CT CRYO ABLATION RENAL TUMOR    KNEE ARTHROSCOPY Left     [facility]    SKIN GRAFT Right     [facility] LEG    SPINE SURGERY  2015    LUMBAR    TONSILLECTOMY  1972     Family History   Problem Relation Age of Onset    Arthritis Mother     Diabetes Mother     High Cholesterol Mother     Diabetes Father     High Cholesterol Father     Heart Disease Father      Current Outpatient Medications on File Prior to Visit   Medication Sig Dispense Refill    carvedilol (COREG) 6.25 MG tablet Take 1 tablet by mouth 2 times daily      Icosapent Ethyl (VASCEPA) 1 g CAPS capsule Take 2 g by mouth 2 times daily (with meals)      Insulin Glargine, 2 Unit Dial, 300 UNIT/ML SOPN Inject 100 Units into the skin 2 times daily      HUMALOG KWIKPEN 200

## 2024-04-18 NOTE — ED PROVIDER NOTES
[facility] ED  EMERGENCY DEPARTMENT ENCOUNTER      Pt Name: Will Bravo  MRN: [id number]  Birthdate 1960  Date of evaluation: 4/18/2024  Provider: Rafi Lopez PA-C  4:57 PM EDT    CHIEF COMPLAINT       Chief Complaint   Patient presents with    Leg Pain         HISTORY OF PRESENT ILLNESS   (Location/Symptom, Timing/Onset, Context/Setting, Quality, Duration, Modifying Factors, Severity)  Note limiting factors.   Will Bravo is a 63 y.o. male who presents to the emergency department with complaint of low back pain, numbness and buttock, radiating down the posterior aspect of right lower extremity, patient states has been ongoing for approximately 1 week, patient states he had been admitted to [facility] in [place] on Sunday, he states he was discharged from their facility at noon today for the same issue.  He followed up with Dr. Kingston of neurosurgery after discharge from the hospital, after review of MRI of the lumbar and thoracic spine, patient was advised to come to [facility] for admission for potential back surgery this upcoming Monday due to spinal stenosis.  Patient states upon being discharged from [facility] he is not able to stand, not able to walk, and has worsening pain to buttock, and posterior right thigh radiating to posterior right knee.  Patient denies any bowel or bladder dysfunction.  Rating current pain at this time 10 out of 10.  Past medical history significant for type 2 diabetes, hyperlipidemia, lumbosacral neuritis, radiculitis, pancreatitis, hypertension.    HPI    Nursing Notes were reviewed.    REVIEW OF SYSTEMS    (2-9 systems for level 4, 10 or more for level 5)     Review of Systems   Constitutional:  Negative for activity change and appetite change.   HENT:  Negative for congestion, ear discharge, ear pain, nosebleeds, rhinorrhea and sore throat.    Eyes:  Negative for discharge.   Respiratory:  Negative for shortness of breath.

## 2024-04-18 NOTE — PROGRESS NOTES
04/18/24 1144   Current Planned Discharge Disposition   Current Planned Discharge Disposition Home     Patient is discharging home, has follow up appointment today with Dr. Gonzales for his back and patient also follows up with pain clinic, Dr. Dolan. Patient had been up in room with PT, using walker which patient states he has at home already. Plan will be home with follow up appointments scheduled. PCP discharged patient with pain medication until patient can be seen at pain clinic.    RUE/ RLE WFL, LUE: shoulder flexion ~ 0-45 degrees, elbow flexion ~0-90, limited  / grasp, LLE: hip flexion 3/4 AROM , knee extension grossly WFL, ankle DF 1/2 AROM

## 2024-04-18 NOTE — DISCHARGE SUMMARY
Discharge Diagnosis  Intractable back pain  1. Intractable back pain    2. Back pain of lumbosacral region with sciatica    3. Redness and swelling of upper arm    4. Acute exacerbation of chronic low back pain       Issues Requiring Follow-Up  Follow-up with neurosurgery  Follow-up with pain management  Follow-up with PCP      Discharge Meds     Your medication list        START taking these medications        Instructions Last Dose Given Next Dose Due   methylPREDNISolone 4 mg tablets  Commonly known as: Medrol Dospak  Start taking on: April 19, 2024      Take 3 tablets 12 mg on 4/19/2024, then take 2 tablets 8 mg on 4/20/2024 then take 1 tablet 4 mg on 4/21/2024 then stop Do not start before April 19, 2024.              CONTINUE taking these medications        Instructions Last Dose Given Next Dose Due   amLODIPine 10 mg tablet  Commonly known as: Norvasc           aspirin 81 mg chewable tablet           atorvastatin 20 mg tablet  Commonly known as: Lipitor           carvedilol 6.25 mg tablet  Commonly known as: Coreg           cyclobenzaprine 10 mg tablet  Commonly known as: Flexeril      Take 1 tablet (10 mg) by mouth 2 times a day as needed for muscle spasms.       fenofibrate micronized 134 mg capsule  Commonly known as: Lofibra           gabapentin 300 mg capsule  Commonly known as: Neurontin           glimepiride 4 mg tablet  Commonly known as: Amaryl           HumaLOG KwikPen Insulin 200 unit/mL (3 mL) insulin pen pen  Generic drug: insulin lispro           hydroCHLOROthiazide 25 mg tablet  Commonly known as: HYDRODiuril           lidocaine 5 % patch  Commonly known as: Lidoderm      Place 1 patch over 12 hours on the skin once daily. Remove & discard patch within 12 hours or as directed by MD.       losartan 100 mg tablet  Commonly known as: Cozaar           metFORMIN 500 mg tablet  Commonly known as: Glucophage           omeprazole 40 mg DR capsule  Commonly known as: PriLOSEC            oxyCODONE-acetaminophen 5-325 mg tablet  Commonly known as: Percocet      Take 1 tablet by mouth every 6 hours if needed for severe pain (7 - 10) for up to 3 days.       Toujeo Max U-300 SoloStar 300 unit/mL (3 mL) injection  Generic drug: insulin glargine           Vascepa 1 gram capsule  Generic drug: icosapent ethyL                  STOP taking these medications      HYDROcodone-acetaminophen 5-325 mg tablet  Commonly known as: Norco        naproxen 500 mg tablet  Commonly known as: Naprosyn                  Where to Get Your Medications        These medications were sent to [id number] - [place], [place] - [address] AT [address] [place] [id number]      Phone: [phone]   methylPREDNISolone 4 mg tablets       You can get these medications from any pharmacy    Bring a paper prescription for each of these medications  oxyCODONE-acetaminophen 5-325 mg tablet         Test Results Pending At Discharge  Pending Labs       No current pending labs.            Hospital Course  Gary Sharma is 63 y.o. male with a history of poorly controlled type 2 diabetes, hypertension, degenerative osteoarthritis, hyperlipidemia, DIAZ cirrhosis, renal carcinoma presenting with intractable back pain.  Orthopedics consulted, patient does not have saddle anesthesia or loss of bowel or bladder function or urinary retention, MRI of lumbar spine was done and showed multilevel degenerative disc disease and facet arthrosis pronounced at L4-L5 with moderate to severe spinal canal stenosis, was evaluated by pain management, due to multiple levels of stenosis unable to do steroid injection, recommended tapering dose steroid and patient has been on tapering dose steroid.  Patient has a pain management doctor as well as neurosurgeon and that he follows up with outpatient and to follow-up outpatient.  Orthopedics signed off as no surgical intervention required for now    #.  Intractable back pain with  sciatica  #.  Degenerative osteoarthritis of spine  -Thoracic MRI showing multilevel degenerative changes without definite spinal cord signal abnormality  Lumbar MRI was done and showed multilevel degenerative disc disease and facet arthrosis pronounced at L4-L5 with moderate to severe spinal canal stenosis,  Was also evaluated by orthopedics, orthopedics signed off no intervention  Pain management recommended to continue steroids, has been on tapering dose steroids  Will give oxycodone short duration  Follow-up with pain management  Continue gabapentin  Patient is stable for discharge today with outpatient follow-up  Discharge plan discussed with the patient and he is in agreement.  Total discharge time spent 35 minutes.    Pertinent Physical Exam At Time of Discharge  Physical Exam  Constitutional:       General: He is not in acute distress.     Appearance: Normal appearance. He is not ill-appearing, toxic-appearing or diaphoretic.   HENT:      Head: Normocephalic and atraumatic.      Mouth/Throat:      Mouth: Mucous membranes are moist.      Pharynx: No oropharyngeal exudate.   Eyes:      Extraocular Movements: Extraocular movements intact.      Pupils: Pupils are equal, round, and reactive to light.   Cardiovascular:      Rate and Rhythm: Normal rate and regular rhythm.      Heart sounds: No murmur heard.  Pulmonary:      Effort: Pulmonary effort is normal. No respiratory distress.      Breath sounds: Normal breath sounds. No wheezing.   Abdominal:      General: Abdomen is flat. Bowel sounds are normal. There is no distension.      Tenderness: There is no abdominal tenderness. There is no guarding or rebound.   Musculoskeletal:         General: No swelling.      Right lower leg: No edema.      Left lower leg: No edema.   Skin:     Findings: No lesion or rash.   Neurological:      General: No focal deficit present.      Mental Status: He is alert and oriented to person, place, and time.      Cranial Nerves: No  cranial nerve deficit.      Motor: No weakness.   Psychiatric:         Mood and Affect: Mood normal.         Behavior: Behavior normal.       Outpatient Follow-Up  No future appointments.    Ra Escoto PA-C  [address]   [facility], [address] [place] [id number] [phone]    Schedule an appointment as soon as possible for a visit in 3 week(s)        Ekaterina Rooney MD

## 2024-04-18 NOTE — ED NOTES
Pt presents to ER with wife for recently being discharged from [facility] and Dr. Kingston saying he is going to try to perform surgery for him on Monday. Pt is in too much pain and was lying on the floor in the lobby due to pain the right leg that goes all the way from the hip down. Pt was given percocet at [facility] today around 12. Pt injured himself 3 weeks ago and since then has gotten no relief. Pt is A&Ox4, warm and dry with vitals stable at this time.

## 2024-04-18 NOTE — H&P
149/93   Pulse 82   Temp 97.7 °F (36.5 °C)   Resp 20   Ht 1.778 m (5' 10\")   Wt 127 kg (280 lb)   SpO2 98%   BMI 40.18 kg/m²   Physical Exam  Vitals and nursing note reviewed.   Constitutional:       General: He is not in acute distress.     Appearance: He is well-developed.   HENT:      Head: Normocephalic.      Nose: No congestion or rhinorrhea.      Mouth/Throat:      Mouth: Mucous membranes are moist.   Cardiovascular:      Rate and Rhythm: Normal rate and regular rhythm.      Pulses: Normal pulses.      Heart sounds: Normal heart sounds.   Pulmonary:      Effort: Pulmonary effort is normal. No respiratory distress.      Breath sounds: Normal breath sounds. No wheezing, rhonchi or rales.   Abdominal:      General: Bowel sounds are normal.      Palpations: Abdomen is soft.      Tenderness: There is no abdominal tenderness.   Musculoskeletal:         General: Tenderness present.      Lumbar back: Spasms and bony tenderness present. Decreased range of motion.      Right hip: Tenderness present. Normal range of motion.      Right knee: Normal range of motion.      Right lower leg: No edema.   Skin:     General: Skin is warm and dry.      Capillary Refill: Capillary refill takes less than 2 seconds.   Neurological:      Mental Status: He is alert and oriented to person, place, and time.      Motor: Weakness present.      Gait: Gait abnormal.   Psychiatric:         Thought Content: Thought content normal.       Neurologic Exam     Mental Status   Oriented to person, place, and time.        DATA:     Diagnostic tests reviewed for today's visit:    Most recent labs and imaging results reviewed.     LABS:    Abnormal Labs Reviewed   CBC WITH AUTO DIFFERENTIAL - Abnormal; Notable for the following components:       Result Value    RBC 4.60 (*)     Hemoglobin 13.8 (*)     Hematocrit 40.0 (*)     All other components within normal limits   COMPREHENSIVE METABOLIC PANEL - Abnormal; Notable for the following

## 2024-04-18 NOTE — PROGRESS NOTES
Physical Therapy    Physical Therapy    Physical Therapy Treatment    Patient Name: Gary Sharma  MRN: [id number]  Today's Date: 4/18/2024  Time Calculation  Start Time: 1100  Stop Time: 1123  Time Calculation (min): 23 min       Assessment/Plan   PT Assessment  PT Assessment Results: Decreased strength, Decreased endurance, Impaired balance, Pain, Decreased mobility  Rehab Prognosis: Good  Evaluation/Treatment Tolerance: Patient limited by fatigue, Patient limited by pain  End of Session Communication: Bedside nurse  Assessment Comment:  (Progression toward goals hindered by back/ leg pain.)  End of Session Patient Position: Bed, 3 rail up     PT Plan  Treatment/Interventions: Bed mobility, Transfer training, Gait training, Stair training, Balance training, Strengthening, Endurance training, Therapeutic exercise, Therapeutic activity, Home exercise program  PT Plan: Skilled PT  PT Frequency: 2 times per week  PT Discharge Recommendations: Low intensity level of continued care  Equipment Recommended upon Discharge: Wheeled walker  PT Recommended Transfer Status: Assistive device, Stand by assist, Assist x1      Current Problem:  1. Intractable back pain        2. Back pain of lumbosacral region with sciatica        3. Redness and swelling of upper arm  Vascular US upper extremity venous duplex left    Vascular US upper extremity venous duplex left      4. Acute exacerbation of chronic low back pain  oxyCODONE-acetaminophen (Percocet) 5-325 mg tablet    methylPREDNISolone (Medrol Dospak) 4 mg tablets          General Visit Information:   PT  Visit  PT Received On: 04/18/24  General  Reason for Referral:  (Pt dealing with lower back pain with sciatica.)  Prior to Session Communication: Bedside nurse  Patient Position Received: Bed, 3 rail up  Preferred Learning Style: verbal  General Comment:  (Pt laying in bed sidelying with c/o back/ leg pain- R sided.)  Subjective     Precautions:  Precautions  Medical  Precautions: Fall precautions         Objective     Pain:  Pain Assessment  Pain Assessment: 0-10  Pain Score: 8  Pain Type: Intractable pain  Pain Location: Back  Pain Orientation: Right, Lower  Pain Interventions: Cold applied                     Treatments:           Bed Mobility  Bed Mobility: Yes  Bed Mobility 1  Bed Mobility 1: Supine to sitting  Level of Assistance 1: Contact guard  Bed Mobility Comments 1:  (Pt transfers onto EOB with instruction in log rolling technique and bringing up upper trunk with pushing off shoulder.)  Bed Mobility 2  Bed Mobility  2: Sitting to supine  Level of Assistance 2: Close supervision  Bed Mobility Comments 2:  (Pt transfers back into bed with log rolling technioque with slow guarded movements.)  Ambulation/Gait Training  Ambulation/Gait Training Performed: Yes  Ambulation/Gait Training 1  Surface 1: Level tile  Device 1: Rolling walker  Assistance 1: Close supervision  Quality of Gait 1: Inconsistent stride length  Comments/Distance (ft) 1:  (Pt ambulates with WW  with slow, uneven guarded steps with close supervision ariella 20 ft.  Pt stood at walker with weight going through LEs.)  Transfers  Transfer: Yes  Transfer 1  Technique 1: Sit to stand, Stand to sit  Transfer Device 1: Walker  Transfer Level of Assistance 1: Contact guard  Trials/Comments 1:  (Pt transfers with WW  with CGA with slow guarded movements.)          Outcome Measures:  [facility] Basic Mobility  Turning from your back to your side while in a flat bed without using bedrails: None  Moving from lying on your back to sitting on the side of a flat bed without using bedrails: A little  Moving to and from bed to chair (including a wheelchair): A little  Standing up from a chair using your arms (e.g. wheelchair or bedside chair): A little  To walk in hospital room: A little  Climbing 3-5 steps with railing: A lot  Basic Mobility - Total Score: 18  Education Documentation  No documentation found.  Education  Comments  No comments found.        EDUCATION:    Individual(s) Educated: Patient  Education Provided: Body Mechanics, Home Safety  Patient/Caregiver Demonstrated Understanding: yes  Patient Response to Education: Patient/Caregiver Verbalized Understanding of Information, Patient/Caregiver Asked Appropriate Questions  Education Comment:  (Pt instructed to inquire at the doctor's office for possible out patient therapy.)  Encounter Problems       Encounter Problems (Resolved)       PT Problem       Pt will demonstrate IND with bed mobility to edge of bed.   (Adequate for Discharge)       Start:  04/16/24    Expected End:  04/30/24    Resolved:  04/18/24         Pt will demonstrate mod I  with sit to stand/chair transfers with FWW.   (Adequate for Discharge)       Start:  04/16/24    Expected End:  04/30/24    Resolved:  04/18/24         Pt will ambulate 100ft feet with FWW mod I .   (Adequate for Discharge)       Start:  04/16/24    Expected End:  04/30/24    Resolved:  04/18/24

## 2024-04-19 ENCOUNTER — APPOINTMENT (OUTPATIENT)
Dept: NEUROSURGERY | Facility: CLINIC | Age: 64
End: 2024-04-19
Payer: COMMERCIAL

## 2024-04-19 ENCOUNTER — PREP FOR PROCEDURE (OUTPATIENT)
Dept: NEUROSURGERY | Age: 64
End: 2024-04-19

## 2024-04-19 PROBLEM — E11.65 POORLY CONTROLLED DIABETES MELLITUS (HCC): Status: ACTIVE | Noted: 2024-04-19

## 2024-04-19 PROBLEM — M54.16 RIGHT LUMBAR RADICULITIS: Status: ACTIVE | Noted: 2024-04-19

## 2024-04-19 LAB
ALBUMIN SERPL-MCNC: 4.4 G/DL (ref 3.5–4.6)
ALP SERPL-CCNC: 48 U/L (ref 35–104)
ALT SERPL-CCNC: 62 U/L (ref 0–41)
ANION GAP SERPL CALCULATED.3IONS-SCNC: 14 MEQ/L (ref 9–15)
AST SERPL-CCNC: 48 U/L (ref 0–40)
BASOPHILS # BLD: 0 K/UL (ref 0–0.2)
BASOPHILS NFR BLD: 0.1 %
BILIRUB SERPL-MCNC: 0.6 MG/DL (ref 0.2–0.7)
BUN SERPL-MCNC: 28 MG/DL (ref 8–23)
CALCIUM SERPL-MCNC: 9.8 MG/DL (ref 8.5–9.9)
CHLORIDE SERPL-SCNC: 95 MEQ/L (ref 95–107)
CO2 SERPL-SCNC: 25 MEQ/L (ref 20–31)
CREAT SERPL-MCNC: 1.1 MG/DL (ref 0.7–1.2)
EKG ATRIAL RATE: 87 BPM
EKG P AXIS: 43 DEGREES
EKG P-R INTERVAL: 156 MS
EKG Q-T INTERVAL: 426 MS
EKG QRS DURATION: 146 MS
EKG QTC CALCULATION (BAZETT): 512 MS
EKG R AXIS: -43 DEGREES
EKG T AXIS: 35 DEGREES
EKG VENTRICULAR RATE: 87 BPM
EOSINOPHIL # BLD: 0 K/UL (ref 0–0.7)
EOSINOPHIL NFR BLD: 0 %
ERYTHROCYTE [DISTWIDTH] IN BLOOD BY AUTOMATED COUNT: 13.2 % (ref 11.5–14.5)
ESTIMATED AVERAGE GLUCOSE: 212 MG/DL
GLOBULIN SER CALC-MCNC: 4.1 G/DL (ref 2.3–3.5)
GLUCOSE BLD-MCNC: 171 MG/DL (ref 70–99)
GLUCOSE BLD-MCNC: 212 MG/DL (ref 70–99)
GLUCOSE BLD-MCNC: 333 MG/DL (ref 70–99)
GLUCOSE BLD-MCNC: 359 MG/DL (ref 70–99)
GLUCOSE SERPL-MCNC: 352 MG/DL (ref 70–99)
HBA1C MFR BLD: 9 % (ref 4–6)
HCT VFR BLD AUTO: 40 % (ref 42–52)
HGB BLD-MCNC: 14 G/DL (ref 14–18)
LYMPHOCYTES # BLD: 2.3 K/UL (ref 1–4.8)
LYMPHOCYTES NFR BLD: 27.9 %
MCH RBC QN AUTO: 30.2 PG (ref 27–31.3)
MCHC RBC AUTO-ENTMCNC: 35 % (ref 33–37)
MCV RBC AUTO: 86.2 FL (ref 79–92.2)
MONOCYTES # BLD: 0.7 K/UL (ref 0.2–0.8)
MONOCYTES NFR BLD: 7.8 %
NEUTROPHILS # BLD: 5.3 K/UL (ref 1.4–6.5)
NEUTS SEG NFR BLD: 63.7 %
PERFORMED ON: ABNORMAL
PLATELET # BLD AUTO: 247 K/UL (ref 130–400)
POTASSIUM SERPL-SCNC: 5 MEQ/L (ref 3.4–4.9)
PROT SERPL-MCNC: 8.5 G/DL (ref 6.3–8)
RBC # BLD AUTO: 4.64 M/UL (ref 4.7–6.1)
REASON FOR REJECTION: NORMAL
REJECTED TEST: NORMAL
SODIUM SERPL-SCNC: 134 MEQ/L (ref 135–144)
WBC # BLD AUTO: 8.4 K/UL (ref 4.8–10.8)

## 2024-04-19 PROCEDURE — 2580000003 HC RX 258

## 2024-04-19 PROCEDURE — 83036 HEMOGLOBIN GLYCOSYLATED A1C: CPT

## 2024-04-19 PROCEDURE — 6370000000 HC RX 637 (ALT 250 FOR IP): Performed by: PAIN MEDICINE

## 2024-04-19 PROCEDURE — 93010 ELECTROCARDIOGRAM REPORT: CPT | Performed by: INTERNAL MEDICINE

## 2024-04-19 PROCEDURE — 80053 COMPREHEN METABOLIC PANEL: CPT

## 2024-04-19 PROCEDURE — 93005 ELECTROCARDIOGRAM TRACING: CPT

## 2024-04-19 PROCEDURE — 6370000000 HC RX 637 (ALT 250 FOR IP): Performed by: INTERNAL MEDICINE

## 2024-04-19 PROCEDURE — 85025 COMPLETE CBC W/AUTO DIFF WBC: CPT

## 2024-04-19 PROCEDURE — 6360000002 HC RX W HCPCS: Performed by: FAMILY MEDICINE

## 2024-04-19 PROCEDURE — 6360000002 HC RX W HCPCS

## 2024-04-19 PROCEDURE — 1210000000 HC MED SURG R&B

## 2024-04-19 PROCEDURE — 36415 COLL VENOUS BLD VENIPUNCTURE: CPT

## 2024-04-19 PROCEDURE — 6370000000 HC RX 637 (ALT 250 FOR IP)

## 2024-04-19 PROCEDURE — 99221 1ST HOSP IP/OBS SF/LOW 40: CPT | Performed by: PAIN MEDICINE

## 2024-04-19 PROCEDURE — 99222 1ST HOSP IP/OBS MODERATE 55: CPT | Performed by: INTERNAL MEDICINE

## 2024-04-19 RX ORDER — OXYCODONE HYDROCHLORIDE AND ACETAMINOPHEN 5; 325 MG/1; MG/1
1 TABLET ORAL EVERY 4 HOURS PRN
Status: DISCONTINUED | OUTPATIENT
Start: 2024-04-19 | End: 2024-04-23 | Stop reason: HOSPADM

## 2024-04-19 RX ORDER — INSULIN LISPRO 100 [IU]/ML
16 INJECTION, SOLUTION INTRAVENOUS; SUBCUTANEOUS
Status: DISCONTINUED | OUTPATIENT
Start: 2024-04-19 | End: 2024-04-20

## 2024-04-19 RX ORDER — INSULIN GLARGINE 100 [IU]/ML
50 INJECTION, SOLUTION SUBCUTANEOUS 2 TIMES DAILY
Status: DISCONTINUED | OUTPATIENT
Start: 2024-04-19 | End: 2024-04-20

## 2024-04-19 RX ORDER — GABAPENTIN 300 MG/1
600 CAPSULE ORAL 4 TIMES DAILY
Status: DISCONTINUED | OUTPATIENT
Start: 2024-04-19 | End: 2024-04-23 | Stop reason: HOSPADM

## 2024-04-19 RX ADMIN — ACETAMINOPHEN 650 MG: 325 TABLET ORAL at 09:31

## 2024-04-19 RX ADMIN — Medication 10 ML: at 20:33

## 2024-04-19 RX ADMIN — GABAPENTIN 600 MG: 300 CAPSULE ORAL at 20:31

## 2024-04-19 RX ADMIN — CARVEDILOL 6.25 MG: 3.12 TABLET, FILM COATED ORAL at 20:30

## 2024-04-19 RX ADMIN — HYDROMORPHONE HYDROCHLORIDE 1 MG: 1 INJECTION, SOLUTION INTRAMUSCULAR; INTRAVENOUS; SUBCUTANEOUS at 17:33

## 2024-04-19 RX ADMIN — INSULIN LISPRO 16 UNITS: 100 INJECTION, SOLUTION INTRAVENOUS; SUBCUTANEOUS at 17:41

## 2024-04-19 RX ADMIN — OXYCODONE AND ACETAMINOPHEN 1 TABLET: 5; 325 TABLET ORAL at 20:30

## 2024-04-19 RX ADMIN — HYDROMORPHONE HYDROCHLORIDE 1 MG: 1 INJECTION, SOLUTION INTRAMUSCULAR; INTRAVENOUS; SUBCUTANEOUS at 05:29

## 2024-04-19 RX ADMIN — ENOXAPARIN SODIUM 30 MG: 100 INJECTION SUBCUTANEOUS at 09:51

## 2024-04-19 RX ADMIN — SODIUM CHLORIDE, PRESERVATIVE FREE 10 ML: 5 INJECTION INTRAVENOUS at 05:29

## 2024-04-19 RX ADMIN — CARVEDILOL 6.25 MG: 3.12 TABLET, FILM COATED ORAL at 09:31

## 2024-04-19 RX ADMIN — OXYCODONE AND ACETAMINOPHEN 1 TABLET: 5; 325 TABLET ORAL at 13:06

## 2024-04-19 RX ADMIN — AMLODIPINE BESYLATE 10 MG: 10 TABLET ORAL at 09:32

## 2024-04-19 RX ADMIN — INSULIN LISPRO 16 UNITS: 100 INJECTION, SOLUTION INTRAVENOUS; SUBCUTANEOUS at 13:06

## 2024-04-19 RX ADMIN — GABAPENTIN 600 MG: 300 CAPSULE ORAL at 17:40

## 2024-04-19 RX ADMIN — GABAPENTIN 600 MG: 300 CAPSULE ORAL at 13:05

## 2024-04-19 RX ADMIN — INSULIN GLARGINE 50 UNITS: 100 INJECTION, SOLUTION SUBCUTANEOUS at 20:33

## 2024-04-19 RX ADMIN — INSULIN LISPRO 12 UNITS: 100 INJECTION, SOLUTION INTRAVENOUS; SUBCUTANEOUS at 12:00

## 2024-04-19 RX ADMIN — HYDROMORPHONE HYDROCHLORIDE 1 MG: 1 INJECTION, SOLUTION INTRAMUSCULAR; INTRAVENOUS; SUBCUTANEOUS at 09:55

## 2024-04-19 RX ADMIN — PANTOPRAZOLE SODIUM 40 MG: 40 TABLET, DELAYED RELEASE ORAL at 05:30

## 2024-04-19 RX ADMIN — ENOXAPARIN SODIUM 30 MG: 100 INJECTION SUBCUTANEOUS at 20:32

## 2024-04-19 RX ADMIN — INSULIN LISPRO 16 UNITS: 100 INJECTION, SOLUTION INTRAVENOUS; SUBCUTANEOUS at 09:37

## 2024-04-19 RX ADMIN — HYDROMORPHONE HYDROCHLORIDE 1 MG: 1 INJECTION, SOLUTION INTRAMUSCULAR; INTRAVENOUS; SUBCUTANEOUS at 02:14

## 2024-04-19 RX ADMIN — INSULIN GLARGINE 30 UNITS: 100 INJECTION, SOLUTION SUBCUTANEOUS at 09:33

## 2024-04-19 ASSESSMENT — PAIN SCALES - GENERAL
PAINLEVEL_OUTOF10: 9
PAINLEVEL_OUTOF10: 4
PAINLEVEL_OUTOF10: 6
PAINLEVEL_OUTOF10: 8
PAINLEVEL_OUTOF10: 5
PAINLEVEL_OUTOF10: 10
PAINLEVEL_OUTOF10: 8
PAINLEVEL_OUTOF10: 7
PAINLEVEL_OUTOF10: 10

## 2024-04-19 ASSESSMENT — PAIN DESCRIPTION - LOCATION
LOCATION: BACK
LOCATION: HIP
LOCATION: BACK

## 2024-04-19 ASSESSMENT — PAIN DESCRIPTION - ORIENTATION
ORIENTATION: MID
ORIENTATION: RIGHT

## 2024-04-19 ASSESSMENT — PAIN DESCRIPTION - DESCRIPTORS: DESCRIPTORS: ACHING
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
## 2024-04-20 LAB
GLUCOSE BLD-MCNC: 122 MG/DL (ref 70–99)
GLUCOSE BLD-MCNC: 181 MG/DL (ref 70–99)
GLUCOSE BLD-MCNC: 256 MG/DL (ref 70–99)
GLUCOSE BLD-MCNC: 263 MG/DL (ref 70–99)
PERFORMED ON: ABNORMAL

## 2024-04-20 PROCEDURE — 6370000000 HC RX 637 (ALT 250 FOR IP): Performed by: INTERNAL MEDICINE

## 2024-04-20 PROCEDURE — 6360000002 HC RX W HCPCS

## 2024-04-20 PROCEDURE — 2580000003 HC RX 258

## 2024-04-20 PROCEDURE — 6370000000 HC RX 637 (ALT 250 FOR IP): Performed by: PAIN MEDICINE

## 2024-04-20 PROCEDURE — 99231 SBSQ HOSP IP/OBS SF/LOW 25: CPT | Performed by: NEUROLOGICAL SURGERY

## 2024-04-20 PROCEDURE — 6360000002 HC RX W HCPCS: Performed by: FAMILY MEDICINE

## 2024-04-20 PROCEDURE — 6370000000 HC RX 637 (ALT 250 FOR IP)

## 2024-04-20 PROCEDURE — 1210000000 HC MED SURG R&B

## 2024-04-20 RX ORDER — INSULIN GLARGINE 100 [IU]/ML
60 INJECTION, SOLUTION SUBCUTANEOUS 2 TIMES DAILY
Status: DISCONTINUED | OUTPATIENT
Start: 2024-04-20 | End: 2024-04-23

## 2024-04-20 RX ORDER — INSULIN LISPRO 100 [IU]/ML
20 INJECTION, SOLUTION INTRAVENOUS; SUBCUTANEOUS
Status: DISCONTINUED | OUTPATIENT
Start: 2024-04-20 | End: 2024-04-23 | Stop reason: HOSPADM

## 2024-04-20 RX ADMIN — ACETAMINOPHEN 650 MG: 325 TABLET ORAL at 11:53

## 2024-04-20 RX ADMIN — GABAPENTIN 600 MG: 300 CAPSULE ORAL at 17:43

## 2024-04-20 RX ADMIN — GABAPENTIN 600 MG: 300 CAPSULE ORAL at 12:25

## 2024-04-20 RX ADMIN — Medication 10 ML: at 21:02

## 2024-04-20 RX ADMIN — OXYCODONE AND ACETAMINOPHEN 1 TABLET: 5; 325 TABLET ORAL at 18:47

## 2024-04-20 RX ADMIN — GABAPENTIN 600 MG: 300 CAPSULE ORAL at 08:40

## 2024-04-20 RX ADMIN — CARVEDILOL 6.25 MG: 3.12 TABLET, FILM COATED ORAL at 08:40

## 2024-04-20 RX ADMIN — INSULIN LISPRO 20 UNITS: 100 INJECTION, SOLUTION INTRAVENOUS; SUBCUTANEOUS at 17:44

## 2024-04-20 RX ADMIN — ACETAMINOPHEN 650 MG: 325 TABLET ORAL at 21:01

## 2024-04-20 RX ADMIN — CARVEDILOL 6.25 MG: 3.12 TABLET, FILM COATED ORAL at 21:01

## 2024-04-20 RX ADMIN — INSULIN LISPRO 8 UNITS: 100 INJECTION, SOLUTION INTRAVENOUS; SUBCUTANEOUS at 08:41

## 2024-04-20 RX ADMIN — INSULIN LISPRO 16 UNITS: 100 INJECTION, SOLUTION INTRAVENOUS; SUBCUTANEOUS at 08:46

## 2024-04-20 RX ADMIN — OXYCODONE AND ACETAMINOPHEN 1 TABLET: 5; 325 TABLET ORAL at 00:46

## 2024-04-20 RX ADMIN — INSULIN LISPRO 8 UNITS: 100 INJECTION, SOLUTION INTRAVENOUS; SUBCUTANEOUS at 11:57

## 2024-04-20 RX ADMIN — OXYCODONE AND ACETAMINOPHEN 1 TABLET: 5; 325 TABLET ORAL at 05:03

## 2024-04-20 RX ADMIN — INSULIN LISPRO 16 UNITS: 100 INJECTION, SOLUTION INTRAVENOUS; SUBCUTANEOUS at 11:56

## 2024-04-20 RX ADMIN — HYDROMORPHONE HYDROCHLORIDE 1 MG: 1 INJECTION, SOLUTION INTRAMUSCULAR; INTRAVENOUS; SUBCUTANEOUS at 02:33

## 2024-04-20 RX ADMIN — OXYCODONE AND ACETAMINOPHEN 1 TABLET: 5; 325 TABLET ORAL at 14:16

## 2024-04-20 RX ADMIN — ENOXAPARIN SODIUM 30 MG: 100 INJECTION SUBCUTANEOUS at 21:02

## 2024-04-20 RX ADMIN — ENOXAPARIN SODIUM 30 MG: 100 INJECTION SUBCUTANEOUS at 08:44

## 2024-04-20 RX ADMIN — OXYCODONE AND ACETAMINOPHEN 1 TABLET: 5; 325 TABLET ORAL at 23:18

## 2024-04-20 RX ADMIN — HYDROMORPHONE HYDROCHLORIDE 1 MG: 1 INJECTION, SOLUTION INTRAMUSCULAR; INTRAVENOUS; SUBCUTANEOUS at 12:25

## 2024-04-20 RX ADMIN — AMLODIPINE BESYLATE 10 MG: 10 TABLET ORAL at 08:40

## 2024-04-20 RX ADMIN — INSULIN GLARGINE 50 UNITS: 100 INJECTION, SOLUTION SUBCUTANEOUS at 08:40

## 2024-04-20 RX ADMIN — POLYETHYLENE GLYCOL 3350 17 G: 17 POWDER, FOR SOLUTION ORAL at 10:19

## 2024-04-20 RX ADMIN — INSULIN GLARGINE 60 UNITS: 100 INJECTION, SOLUTION SUBCUTANEOUS at 21:02

## 2024-04-20 RX ADMIN — PANTOPRAZOLE SODIUM 40 MG: 40 TABLET, DELAYED RELEASE ORAL at 06:39

## 2024-04-20 RX ADMIN — OXYCODONE AND ACETAMINOPHEN 1 TABLET: 5; 325 TABLET ORAL at 10:19

## 2024-04-20 RX ADMIN — Medication 10 ML: at 08:47

## 2024-04-20 RX ADMIN — GABAPENTIN 600 MG: 300 CAPSULE ORAL at 21:02

## 2024-04-20 ASSESSMENT — PAIN DESCRIPTION - ORIENTATION
ORIENTATION: RIGHT

## 2024-04-20 ASSESSMENT — PAIN SCALES - GENERAL
PAINLEVEL_OUTOF10: 6
PAINLEVEL_OUTOF10: 10
PAINLEVEL_OUTOF10: 10
PAINLEVEL_OUTOF10: 9
PAINLEVEL_OUTOF10: 7
PAINLEVEL_OUTOF10: 6
PAINLEVEL_OUTOF10: 7
PAINLEVEL_OUTOF10: 5
PAINLEVEL_OUTOF10: 1
PAINLEVEL_OUTOF10: 7
PAINLEVEL_OUTOF10: 7

## 2024-04-20 ASSESSMENT — PAIN DESCRIPTION - DESCRIPTORS
DESCRIPTORS: ACHING

## 2024-04-20 ASSESSMENT — PAIN DESCRIPTION - LOCATION
LOCATION: HIP;LEG
LOCATION: LEG
LOCATION: HIP
LOCATION: HIP
LOCATION: LEG

## 2024-04-20 ASSESSMENT — PAIN DESCRIPTION - FREQUENCY: FREQUENCY: CONTINUOUS

## 2024-04-20 ASSESSMENT — PAIN DESCRIPTION - ONSET: ONSET: ON-GOING

## 2024-04-21 LAB
GLUCOSE BLD-MCNC: 181 MG/DL (ref 70–99)
GLUCOSE BLD-MCNC: 229 MG/DL (ref 70–99)
GLUCOSE BLD-MCNC: 265 MG/DL (ref 70–99)
GLUCOSE BLD-MCNC: 269 MG/DL (ref 70–99)
PERFORMED ON: ABNORMAL

## 2024-04-21 PROCEDURE — 6370000000 HC RX 637 (ALT 250 FOR IP): Performed by: INTERNAL MEDICINE

## 2024-04-21 PROCEDURE — 2580000003 HC RX 258

## 2024-04-21 PROCEDURE — 6360000002 HC RX W HCPCS: Performed by: FAMILY MEDICINE

## 2024-04-21 PROCEDURE — 6360000002 HC RX W HCPCS

## 2024-04-21 PROCEDURE — 6370000000 HC RX 637 (ALT 250 FOR IP): Performed by: PAIN MEDICINE

## 2024-04-21 PROCEDURE — 1210000000 HC MED SURG R&B

## 2024-04-21 PROCEDURE — 6370000000 HC RX 637 (ALT 250 FOR IP)

## 2024-04-21 RX ADMIN — HYDROMORPHONE HYDROCHLORIDE 1 MG: 1 INJECTION, SOLUTION INTRAMUSCULAR; INTRAVENOUS; SUBCUTANEOUS at 17:24

## 2024-04-21 RX ADMIN — OXYCODONE AND ACETAMINOPHEN 1 TABLET: 5; 325 TABLET ORAL at 08:26

## 2024-04-21 RX ADMIN — INSULIN LISPRO 4 UNITS: 100 INJECTION, SOLUTION INTRAVENOUS; SUBCUTANEOUS at 17:19

## 2024-04-21 RX ADMIN — HYDROMORPHONE HYDROCHLORIDE 1 MG: 1 INJECTION, SOLUTION INTRAMUSCULAR; INTRAVENOUS; SUBCUTANEOUS at 04:58

## 2024-04-21 RX ADMIN — AMLODIPINE BESYLATE 10 MG: 10 TABLET ORAL at 08:27

## 2024-04-21 RX ADMIN — OXYCODONE AND ACETAMINOPHEN 1 TABLET: 5; 325 TABLET ORAL at 12:23

## 2024-04-21 RX ADMIN — INSULIN LISPRO 20 UNITS: 100 INJECTION, SOLUTION INTRAVENOUS; SUBCUTANEOUS at 17:19

## 2024-04-21 RX ADMIN — CARVEDILOL 6.25 MG: 3.12 TABLET, FILM COATED ORAL at 08:27

## 2024-04-21 RX ADMIN — PANTOPRAZOLE SODIUM 40 MG: 40 TABLET, DELAYED RELEASE ORAL at 08:27

## 2024-04-21 RX ADMIN — INSULIN LISPRO 20 UNITS: 100 INJECTION, SOLUTION INTRAVENOUS; SUBCUTANEOUS at 12:24

## 2024-04-21 RX ADMIN — OXYCODONE AND ACETAMINOPHEN 1 TABLET: 5; 325 TABLET ORAL at 03:47

## 2024-04-21 RX ADMIN — GABAPENTIN 600 MG: 300 CAPSULE ORAL at 16:19

## 2024-04-21 RX ADMIN — INSULIN LISPRO 20 UNITS: 100 INJECTION, SOLUTION INTRAVENOUS; SUBCUTANEOUS at 08:28

## 2024-04-21 RX ADMIN — Medication 10 ML: at 08:30

## 2024-04-21 RX ADMIN — ENOXAPARIN SODIUM 30 MG: 100 INJECTION SUBCUTANEOUS at 08:30

## 2024-04-21 RX ADMIN — OXYCODONE AND ACETAMINOPHEN 1 TABLET: 5; 325 TABLET ORAL at 20:25

## 2024-04-21 RX ADMIN — GABAPENTIN 600 MG: 300 CAPSULE ORAL at 12:23

## 2024-04-21 RX ADMIN — INSULIN GLARGINE 60 UNITS: 100 INJECTION, SOLUTION SUBCUTANEOUS at 20:25

## 2024-04-21 RX ADMIN — GABAPENTIN 600 MG: 300 CAPSULE ORAL at 08:27

## 2024-04-21 RX ADMIN — INSULIN LISPRO 8 UNITS: 100 INJECTION, SOLUTION INTRAVENOUS; SUBCUTANEOUS at 12:24

## 2024-04-21 RX ADMIN — CARVEDILOL 6.25 MG: 3.12 TABLET, FILM COATED ORAL at 20:25

## 2024-04-21 RX ADMIN — Medication 10 ML: at 20:27

## 2024-04-21 RX ADMIN — OXYCODONE AND ACETAMINOPHEN 1 TABLET: 5; 325 TABLET ORAL at 16:19

## 2024-04-21 RX ADMIN — INSULIN LISPRO 8 UNITS: 100 INJECTION, SOLUTION INTRAVENOUS; SUBCUTANEOUS at 08:29

## 2024-04-21 RX ADMIN — GABAPENTIN 600 MG: 300 CAPSULE ORAL at 20:25

## 2024-04-21 RX ADMIN — INSULIN GLARGINE 60 UNITS: 100 INJECTION, SOLUTION SUBCUTANEOUS at 08:29

## 2024-04-21 ASSESSMENT — PAIN SCALES - GENERAL
PAINLEVEL_OUTOF10: 10
PAINLEVEL_OUTOF10: 4
PAINLEVEL_OUTOF10: 6
PAINLEVEL_OUTOF10: 6
PAINLEVEL_OUTOF10: 5
PAINLEVEL_OUTOF10: 7
PAINLEVEL_OUTOF10: 7
PAINLEVEL_OUTOF10: 10

## 2024-04-21 ASSESSMENT — PAIN DESCRIPTION - DESCRIPTORS
DESCRIPTORS: ACHING

## 2024-04-21 ASSESSMENT — PAIN DESCRIPTION - PAIN TYPE: TYPE: ACUTE PAIN

## 2024-04-21 ASSESSMENT — PAIN DESCRIPTION - LOCATION
LOCATION: LEG

## 2024-04-21 ASSESSMENT — PAIN DESCRIPTION - ORIENTATION
ORIENTATION: LEFT
ORIENTATION: LEFT
ORIENTATION: RIGHT
ORIENTATION: LEFT

## 2024-04-22 ENCOUNTER — ANESTHESIA (OUTPATIENT)
Dept: OPERATING ROOM | Age: 64
DRG: 519 | End: 2024-04-22
Payer: MEDICARE

## 2024-04-22 ENCOUNTER — APPOINTMENT (OUTPATIENT)
Dept: GENERAL RADIOLOGY | Age: 64
DRG: 519 | End: 2024-04-22
Payer: MEDICARE

## 2024-04-22 ENCOUNTER — ANESTHESIA EVENT (OUTPATIENT)
Dept: OPERATING ROOM | Age: 64
DRG: 519 | End: 2024-04-22
Payer: MEDICARE

## 2024-04-22 DIAGNOSIS — G99.2 MYELOPATHY CONCURRENT WITH AND DUE TO STENOSIS OF LUMBAR SPINE (HCC): Primary | ICD-10-CM

## 2024-04-22 DIAGNOSIS — M48.061 MYELOPATHY CONCURRENT WITH AND DUE TO STENOSIS OF LUMBAR SPINE (HCC): Primary | ICD-10-CM

## 2024-04-22 PROBLEM — F43.9 STRESS AT HOME: Status: ACTIVE | Noted: 2022-11-14

## 2024-04-22 PROBLEM — M79.89 SWELLING OF UPPER ARM: Status: ACTIVE | Noted: 2024-04-14

## 2024-04-22 PROBLEM — Z74.09 IMPAIRED MOBILITY AND ACTIVITIES OF DAILY LIVING: Status: ACTIVE | Noted: 2024-04-22

## 2024-04-22 PROBLEM — Z85.528 HISTORY OF RENAL CELL CANCER: Status: ACTIVE | Noted: 2023-09-08

## 2024-04-22 PROBLEM — E66.813 OBESITY, CLASS III, BMI 40-49.9 (MORBID OBESITY) (HCC): Status: ACTIVE | Noted: 2023-02-03

## 2024-04-22 PROBLEM — K74.60 CIRRHOSIS OF LIVER WITHOUT ASCITES (HCC): Status: ACTIVE | Noted: 2020-11-10

## 2024-04-22 PROBLEM — G89.29 CHRONIC PAIN OF RIGHT KNEE: Status: ACTIVE | Noted: 2020-02-11

## 2024-04-22 PROBLEM — S39.012A STRAIN OF LUMBAR REGION: Status: ACTIVE | Noted: 2024-04-02

## 2024-04-22 PROBLEM — E11.21 DIABETIC NEPHROPATHY ASSOCIATED WITH TYPE 2 DIABETES MELLITUS (HCC): Status: ACTIVE | Noted: 2022-03-04

## 2024-04-22 PROBLEM — F43.21 SITUATIONAL DEPRESSION: Status: ACTIVE | Noted: 2022-11-14

## 2024-04-22 PROBLEM — M25.561 CHRONIC PAIN OF RIGHT KNEE: Status: ACTIVE | Noted: 2020-02-11

## 2024-04-22 PROBLEM — Z78.9 HEPATITIS A IMMUNE: Status: ACTIVE | Noted: 2022-03-17

## 2024-04-22 PROBLEM — Z78.9 IMPAIRED MOBILITY AND ACTIVITIES OF DAILY LIVING: Status: ACTIVE | Noted: 2024-04-22

## 2024-04-22 PROBLEM — K76.6 PORTAL HYPERTENSION (HCC): Status: ACTIVE | Noted: 2023-05-05

## 2024-04-22 PROBLEM — E83.52 HYPERCALCEMIA: Status: ACTIVE | Noted: 2022-05-31

## 2024-04-22 PROBLEM — K76.0 HEPATIC STEATOSIS: Status: ACTIVE | Noted: 2020-11-10

## 2024-04-22 PROBLEM — E66.01 OBESITY, CLASS III, BMI 40-49.9 (MORBID OBESITY) (HCC): Status: ACTIVE | Noted: 2023-02-03

## 2024-04-22 PROBLEM — R80.9 ALBUMINURIA: Status: ACTIVE | Noted: 2021-09-13

## 2024-04-22 PROBLEM — E88.810 METABOLIC SYNDROME: Status: ACTIVE | Noted: 2022-03-17

## 2024-04-22 PROBLEM — K22.9 ESOPHAGEAL DISORDER: Status: ACTIVE | Noted: 2019-03-14

## 2024-04-22 LAB
GLUCOSE BLD-MCNC: 248 MG/DL (ref 70–99)
GLUCOSE BLD-MCNC: 265 MG/DL (ref 70–99)
GLUCOSE BLD-MCNC: 351 MG/DL (ref 70–99)
PERFORMED ON: ABNORMAL

## 2024-04-22 PROCEDURE — 6360000002 HC RX W HCPCS: Performed by: ANESTHESIOLOGY

## 2024-04-22 PROCEDURE — 2580000003 HC RX 258: Performed by: NEUROLOGICAL SURGERY

## 2024-04-22 PROCEDURE — C1713 ANCHOR/SCREW BN/BN,TIS/BN: HCPCS | Performed by: NEUROLOGICAL SURGERY

## 2024-04-22 PROCEDURE — 2580000003 HC RX 258

## 2024-04-22 PROCEDURE — 3600000014 HC SURGERY LEVEL 4 ADDTL 15MIN: Performed by: NEUROLOGICAL SURGERY

## 2024-04-22 PROCEDURE — 6370000000 HC RX 637 (ALT 250 FOR IP)

## 2024-04-22 PROCEDURE — 3600000004 HC SURGERY LEVEL 4 BASE: Performed by: NEUROLOGICAL SURGERY

## 2024-04-22 PROCEDURE — A4217 STERILE WATER/SALINE, 500 ML: HCPCS | Performed by: NEUROLOGICAL SURGERY

## 2024-04-22 PROCEDURE — 6360000002 HC RX W HCPCS: Performed by: NEUROLOGICAL SURGERY

## 2024-04-22 PROCEDURE — 6370000000 HC RX 637 (ALT 250 FOR IP): Performed by: INTERNAL MEDICINE

## 2024-04-22 PROCEDURE — 3700000001 HC ADD 15 MINUTES (ANESTHESIA): Performed by: NEUROLOGICAL SURGERY

## 2024-04-22 PROCEDURE — 2500000003 HC RX 250 WO HCPCS: Performed by: NEUROLOGICAL SURGERY

## 2024-04-22 PROCEDURE — 6370000000 HC RX 637 (ALT 250 FOR IP): Performed by: NEUROLOGICAL SURGERY

## 2024-04-22 PROCEDURE — 2709999900 HC NON-CHARGEABLE SUPPLY: Performed by: NEUROLOGICAL SURGERY

## 2024-04-22 PROCEDURE — 6360000002 HC RX W HCPCS: Performed by: ANESTHESIOLOGIST ASSISTANT

## 2024-04-22 PROCEDURE — 2500000003 HC RX 250 WO HCPCS: Performed by: ANESTHESIOLOGIST ASSISTANT

## 2024-04-22 PROCEDURE — 99231 SBSQ HOSP IP/OBS SF/LOW 25: CPT | Performed by: INTERNAL MEDICINE

## 2024-04-22 PROCEDURE — 6360000002 HC RX W HCPCS: Performed by: FAMILY MEDICINE

## 2024-04-22 PROCEDURE — 2580000003 HC RX 258: Performed by: ANESTHESIOLOGY

## 2024-04-22 PROCEDURE — 0SB20ZZ EXCISION OF LUMBAR VERTEBRAL DISC, OPEN APPROACH: ICD-10-PCS | Performed by: NEUROLOGICAL SURGERY

## 2024-04-22 PROCEDURE — 63047 LAM FACETEC & FORAMOT LUMBAR: CPT | Performed by: NEUROLOGICAL SURGERY

## 2024-04-22 PROCEDURE — 7100000000 HC PACU RECOVERY - FIRST 15 MIN: Performed by: NEUROLOGICAL SURGERY

## 2024-04-22 PROCEDURE — 1210000000 HC MED SURG R&B

## 2024-04-22 PROCEDURE — 6370000000 HC RX 637 (ALT 250 FOR IP): Performed by: PAIN MEDICINE

## 2024-04-22 PROCEDURE — 7100000001 HC PACU RECOVERY - ADDTL 15 MIN: Performed by: NEUROLOGICAL SURGERY

## 2024-04-22 PROCEDURE — 3700000000 HC ANESTHESIA ATTENDED CARE: Performed by: NEUROLOGICAL SURGERY

## 2024-04-22 PROCEDURE — 2720000010 HC SURG SUPPLY STERILE: Performed by: NEUROLOGICAL SURGERY

## 2024-04-22 PROCEDURE — 76942 ECHO GUIDE FOR BIOPSY: CPT | Performed by: ANESTHESIOLOGY

## 2024-04-22 PROCEDURE — 6360000002 HC RX W HCPCS

## 2024-04-22 RX ORDER — DIPHENHYDRAMINE HYDROCHLORIDE 50 MG/ML
12.5 INJECTION INTRAMUSCULAR; INTRAVENOUS
Status: DISCONTINUED | OUTPATIENT
Start: 2024-04-22 | End: 2024-04-22 | Stop reason: HOSPADM

## 2024-04-22 RX ORDER — SODIUM CHLORIDE 9 MG/ML
INJECTION, SOLUTION INTRAVENOUS CONTINUOUS
Status: DISCONTINUED | OUTPATIENT
Start: 2024-04-22 | End: 2024-04-23 | Stop reason: HOSPADM

## 2024-04-22 RX ORDER — BISACODYL 5 MG/1
5 TABLET, DELAYED RELEASE ORAL DAILY
Status: DISCONTINUED | OUTPATIENT
Start: 2024-04-22 | End: 2024-04-23 | Stop reason: HOSPADM

## 2024-04-22 RX ORDER — METOCLOPRAMIDE HYDROCHLORIDE 5 MG/ML
10 INJECTION INTRAMUSCULAR; INTRAVENOUS
Status: DISCONTINUED | OUTPATIENT
Start: 2024-04-22 | End: 2024-04-22 | Stop reason: HOSPADM

## 2024-04-22 RX ORDER — OXYCODONE HYDROCHLORIDE 5 MG/1
5 TABLET ORAL
Status: DISCONTINUED | OUTPATIENT
Start: 2024-04-22 | End: 2024-04-22 | Stop reason: HOSPADM

## 2024-04-22 RX ORDER — ROPIVACAINE HYDROCHLORIDE 5 MG/ML
INJECTION, SOLUTION EPIDURAL; INFILTRATION; PERINEURAL PRN
Status: DISCONTINUED | OUTPATIENT
Start: 2024-04-22 | End: 2024-04-22 | Stop reason: SDUPTHER

## 2024-04-22 RX ORDER — LIDOCAINE HYDROCHLORIDE AND EPINEPHRINE 10; 10 MG/ML; UG/ML
INJECTION, SOLUTION INFILTRATION; PERINEURAL PRN
Status: DISCONTINUED | OUTPATIENT
Start: 2024-04-22 | End: 2024-04-22 | Stop reason: ALTCHOICE

## 2024-04-22 RX ORDER — OXYCODONE HYDROCHLORIDE 5 MG/1
5 TABLET ORAL EVERY 4 HOURS PRN
Status: DISCONTINUED | OUTPATIENT
Start: 2024-04-22 | End: 2024-04-23 | Stop reason: HOSPADM

## 2024-04-22 RX ORDER — DEXAMETHASONE SODIUM PHOSPHATE 10 MG/ML
INJECTION INTRAMUSCULAR; INTRAVENOUS PRN
Status: DISCONTINUED | OUTPATIENT
Start: 2024-04-22 | End: 2024-04-22 | Stop reason: SDUPTHER

## 2024-04-22 RX ORDER — HYDROCODONE BITARTRATE AND ACETAMINOPHEN 5; 325 MG/1; MG/1
1 TABLET ORAL EVERY 6 HOURS PRN
Qty: 56 TABLET | Refills: 0 | Status: SHIPPED | OUTPATIENT
Start: 2024-04-22 | End: 2024-04-23 | Stop reason: HOSPADM

## 2024-04-22 RX ORDER — ONDANSETRON 2 MG/ML
INJECTION INTRAMUSCULAR; INTRAVENOUS PRN
Status: DISCONTINUED | OUTPATIENT
Start: 2024-04-22 | End: 2024-04-22 | Stop reason: SDUPTHER

## 2024-04-22 RX ORDER — PROPOFOL 10 MG/ML
INJECTION, EMULSION INTRAVENOUS PRN
Status: DISCONTINUED | OUTPATIENT
Start: 2024-04-22 | End: 2024-04-22 | Stop reason: SDUPTHER

## 2024-04-22 RX ORDER — SODIUM CHLORIDE, SODIUM LACTATE, POTASSIUM CHLORIDE, CALCIUM CHLORIDE 600; 310; 30; 20 MG/100ML; MG/100ML; MG/100ML; MG/100ML
INJECTION, SOLUTION INTRAVENOUS CONTINUOUS
Status: DISCONTINUED | OUTPATIENT
Start: 2024-04-22 | End: 2024-04-22 | Stop reason: HOSPADM

## 2024-04-22 RX ORDER — SODIUM CHLORIDE 0.9 % (FLUSH) 0.9 %
5-40 SYRINGE (ML) INJECTION EVERY 12 HOURS SCHEDULED
Status: DISCONTINUED | OUTPATIENT
Start: 2024-04-22 | End: 2024-04-23 | Stop reason: HOSPADM

## 2024-04-22 RX ORDER — SODIUM CHLORIDE 0.9 % (FLUSH) 0.9 %
5-40 SYRINGE (ML) INJECTION PRN
Status: DISCONTINUED | OUTPATIENT
Start: 2024-04-22 | End: 2024-04-23 | Stop reason: HOSPADM

## 2024-04-22 RX ORDER — FENTANYL CITRATE 0.05 MG/ML
50 INJECTION, SOLUTION INTRAMUSCULAR; INTRAVENOUS EVERY 10 MIN PRN
Status: DISCONTINUED | OUTPATIENT
Start: 2024-04-22 | End: 2024-04-22 | Stop reason: HOSPADM

## 2024-04-22 RX ORDER — SODIUM CHLORIDE 9 MG/ML
INJECTION, SOLUTION INTRAVENOUS PRN
Status: DISCONTINUED | OUTPATIENT
Start: 2024-04-22 | End: 2024-04-23 | Stop reason: HOSPADM

## 2024-04-22 RX ORDER — MEPERIDINE HYDROCHLORIDE 25 MG/ML
12.5 INJECTION INTRAMUSCULAR; INTRAVENOUS; SUBCUTANEOUS
Status: DISCONTINUED | OUTPATIENT
Start: 2024-04-22 | End: 2024-04-22 | Stop reason: HOSPADM

## 2024-04-22 RX ORDER — ROCURONIUM BROMIDE 10 MG/ML
INJECTION, SOLUTION INTRAVENOUS PRN
Status: DISCONTINUED | OUTPATIENT
Start: 2024-04-22 | End: 2024-04-22 | Stop reason: SDUPTHER

## 2024-04-22 RX ORDER — SODIUM CHLORIDE 0.9 % (FLUSH) 0.9 %
5-40 SYRINGE (ML) INJECTION PRN
Status: DISCONTINUED | OUTPATIENT
Start: 2024-04-22 | End: 2024-04-22 | Stop reason: HOSPADM

## 2024-04-22 RX ORDER — FENTANYL CITRATE 50 UG/ML
INJECTION, SOLUTION INTRAMUSCULAR; INTRAVENOUS PRN
Status: DISCONTINUED | OUTPATIENT
Start: 2024-04-22 | End: 2024-04-22 | Stop reason: SDUPTHER

## 2024-04-22 RX ORDER — ACETAMINOPHEN 325 MG/1
650 TABLET ORAL EVERY 6 HOURS
Status: DISCONTINUED | OUTPATIENT
Start: 2024-04-22 | End: 2024-04-23 | Stop reason: HOSPADM

## 2024-04-22 RX ORDER — INSULIN LISPRO 100 [IU]/ML
10 INJECTION, SOLUTION INTRAVENOUS; SUBCUTANEOUS ONCE
Status: COMPLETED | OUTPATIENT
Start: 2024-04-23 | End: 2024-04-22

## 2024-04-22 RX ORDER — MIDAZOLAM HYDROCHLORIDE 1 MG/ML
INJECTION INTRAMUSCULAR; INTRAVENOUS PRN
Status: DISCONTINUED | OUTPATIENT
Start: 2024-04-22 | End: 2024-04-22 | Stop reason: SDUPTHER

## 2024-04-22 RX ORDER — MAGNESIUM HYDROXIDE 1200 MG/15ML
LIQUID ORAL CONTINUOUS PRN
Status: COMPLETED | OUTPATIENT
Start: 2024-04-22 | End: 2024-04-22

## 2024-04-22 RX ORDER — NALOXONE HYDROCHLORIDE 0.4 MG/ML
INJECTION, SOLUTION INTRAMUSCULAR; INTRAVENOUS; SUBCUTANEOUS PRN
Status: DISCONTINUED | OUTPATIENT
Start: 2024-04-22 | End: 2024-04-22 | Stop reason: HOSPADM

## 2024-04-22 RX ORDER — LIDOCAINE HYDROCHLORIDE 20 MG/ML
INJECTION, SOLUTION INTRAVENOUS PRN
Status: DISCONTINUED | OUTPATIENT
Start: 2024-04-22 | End: 2024-04-22 | Stop reason: SDUPTHER

## 2024-04-22 RX ORDER — OXYCODONE HYDROCHLORIDE 5 MG/1
5 TABLET ORAL EVERY 6 HOURS PRN
Status: DISCONTINUED | OUTPATIENT
Start: 2024-04-22 | End: 2024-04-23 | Stop reason: HOSPADM

## 2024-04-22 RX ORDER — SODIUM CHLORIDE 9 MG/ML
INJECTION, SOLUTION INTRAVENOUS PRN
Status: DISCONTINUED | OUTPATIENT
Start: 2024-04-22 | End: 2024-04-22 | Stop reason: HOSPADM

## 2024-04-22 RX ORDER — ONDANSETRON 2 MG/ML
4 INJECTION INTRAMUSCULAR; INTRAVENOUS
Status: DISCONTINUED | OUTPATIENT
Start: 2024-04-22 | End: 2024-04-22 | Stop reason: HOSPADM

## 2024-04-22 RX ORDER — POLYETHYLENE GLYCOL 3350 17 G/17G
17 POWDER, FOR SOLUTION ORAL DAILY PRN
Status: DISCONTINUED | OUTPATIENT
Start: 2024-04-22 | End: 2024-04-23 | Stop reason: HOSPADM

## 2024-04-22 RX ORDER — KETOROLAC TROMETHAMINE 30 MG/ML
30 INJECTION, SOLUTION INTRAMUSCULAR; INTRAVENOUS EVERY 6 HOURS
Status: DISCONTINUED | OUTPATIENT
Start: 2024-04-22 | End: 2024-04-23 | Stop reason: HOSPADM

## 2024-04-22 RX ORDER — SODIUM CHLORIDE 0.9 % (FLUSH) 0.9 %
5-40 SYRINGE (ML) INJECTION EVERY 12 HOURS SCHEDULED
Status: DISCONTINUED | OUTPATIENT
Start: 2024-04-22 | End: 2024-04-22 | Stop reason: HOSPADM

## 2024-04-22 RX ORDER — ONDANSETRON 2 MG/ML
4 INJECTION INTRAMUSCULAR; INTRAVENOUS EVERY 6 HOURS PRN
Status: DISCONTINUED | OUTPATIENT
Start: 2024-04-22 | End: 2024-04-22 | Stop reason: SDUPTHER

## 2024-04-22 RX ORDER — VANCOMYCIN HYDROCHLORIDE 1 G/20ML
INJECTION, POWDER, LYOPHILIZED, FOR SOLUTION INTRAVENOUS PRN
Status: DISCONTINUED | OUTPATIENT
Start: 2024-04-22 | End: 2024-04-22 | Stop reason: ALTCHOICE

## 2024-04-22 RX ADMIN — SODIUM CHLORIDE, POTASSIUM CHLORIDE, SODIUM LACTATE AND CALCIUM CHLORIDE: 600; 310; 30; 20 INJECTION, SOLUTION INTRAVENOUS at 09:24

## 2024-04-22 RX ADMIN — PROPOFOL 200 MG: 10 INJECTION, EMULSION INTRAVENOUS at 13:08

## 2024-04-22 RX ADMIN — ENOXAPARIN SODIUM 30 MG: 100 INJECTION SUBCUTANEOUS at 19:48

## 2024-04-22 RX ADMIN — LIDOCAINE HYDROCHLORIDE 80 MG: 20 INJECTION, SOLUTION INTRAVENOUS at 13:08

## 2024-04-22 RX ADMIN — PHENYLEPHRINE HYDROCHLORIDE 100 MCG: 10 INJECTION INTRAVENOUS at 14:25

## 2024-04-22 RX ADMIN — MIDAZOLAM HYDROCHLORIDE 2 MG: 1 INJECTION, SOLUTION INTRAMUSCULAR; INTRAVENOUS at 11:29

## 2024-04-22 RX ADMIN — AMLODIPINE BESYLATE 10 MG: 10 TABLET ORAL at 08:35

## 2024-04-22 RX ADMIN — ROCURONIUM BROMIDE 20 MG: 10 INJECTION INTRAVENOUS at 14:49

## 2024-04-22 RX ADMIN — ROCURONIUM BROMIDE 30 MG: 10 INJECTION INTRAVENOUS at 13:32

## 2024-04-22 RX ADMIN — KETOROLAC TROMETHAMINE 30 MG: 30 INJECTION INTRAMUSCULAR; INTRAVENOUS at 18:08

## 2024-04-22 RX ADMIN — Medication 10 ML: at 08:35

## 2024-04-22 RX ADMIN — ROPIVACAINE HYDROCHLORIDE 30 ML: 5 INJECTION, SOLUTION EPIDURAL; INFILTRATION; PERINEURAL at 11:34

## 2024-04-22 RX ADMIN — GABAPENTIN 600 MG: 300 CAPSULE ORAL at 18:08

## 2024-04-22 RX ADMIN — SODIUM CHLORIDE: 9 INJECTION, SOLUTION INTRAVENOUS at 18:14

## 2024-04-22 RX ADMIN — KETOROLAC TROMETHAMINE 30 MG: 30 INJECTION INTRAMUSCULAR; INTRAVENOUS at 23:40

## 2024-04-22 RX ADMIN — HYDROMORPHONE HYDROCHLORIDE 0.5 MG: 1 INJECTION, SOLUTION INTRAMUSCULAR; INTRAVENOUS; SUBCUTANEOUS at 19:49

## 2024-04-22 RX ADMIN — GABAPENTIN 600 MG: 300 CAPSULE ORAL at 19:49

## 2024-04-22 RX ADMIN — SODIUM CHLORIDE 3000 MG: 9 INJECTION, SOLUTION INTRAVENOUS at 21:19

## 2024-04-22 RX ADMIN — CARVEDILOL 6.25 MG: 3.12 TABLET, FILM COATED ORAL at 19:49

## 2024-04-22 RX ADMIN — ONDANSETRON 4 MG: 2 INJECTION INTRAMUSCULAR; INTRAVENOUS at 15:14

## 2024-04-22 RX ADMIN — OXYCODONE AND ACETAMINOPHEN 1 TABLET: 5; 325 TABLET ORAL at 00:59

## 2024-04-22 RX ADMIN — FENTANYL CITRATE 50 MCG: 50 INJECTION, SOLUTION INTRAMUSCULAR; INTRAVENOUS at 13:32

## 2024-04-22 RX ADMIN — SUGAMMADEX 200 MG: 100 INJECTION, SOLUTION INTRAVENOUS at 15:23

## 2024-04-22 RX ADMIN — PHENYLEPHRINE HYDROCHLORIDE 100 MCG: 10 INJECTION INTRAVENOUS at 14:11

## 2024-04-22 RX ADMIN — PHENYLEPHRINE HYDROCHLORIDE 200 MCG: 10 INJECTION INTRAVENOUS at 14:44

## 2024-04-22 RX ADMIN — ACETAMINOPHEN 650 MG: 325 TABLET ORAL at 23:40

## 2024-04-22 RX ADMIN — FENTANYL CITRATE 50 MCG: 50 INJECTION, SOLUTION INTRAMUSCULAR; INTRAVENOUS at 13:01

## 2024-04-22 RX ADMIN — Medication 10 ML: at 21:19

## 2024-04-22 RX ADMIN — INSULIN LISPRO 4 UNITS: 100 INJECTION, SOLUTION INTRAVENOUS; SUBCUTANEOUS at 08:34

## 2024-04-22 RX ADMIN — DEXAMETHASONE SODIUM PHOSPHATE 4 MG: 10 INJECTION INTRAMUSCULAR; INTRAVENOUS at 13:32

## 2024-04-22 RX ADMIN — OXYCODONE AND ACETAMINOPHEN 1 TABLET: 5; 325 TABLET ORAL at 05:09

## 2024-04-22 RX ADMIN — INSULIN GLARGINE 60 UNITS: 100 INJECTION, SOLUTION SUBCUTANEOUS at 21:20

## 2024-04-22 RX ADMIN — FENTANYL CITRATE 50 MCG: 50 INJECTION, SOLUTION INTRAMUSCULAR; INTRAVENOUS at 13:08

## 2024-04-22 RX ADMIN — CARVEDILOL 6.25 MG: 3.12 TABLET, FILM COATED ORAL at 08:35

## 2024-04-22 RX ADMIN — ACETAMINOPHEN 650 MG: 325 TABLET ORAL at 18:07

## 2024-04-22 RX ADMIN — INSULIN LISPRO 10 UNITS: 100 INJECTION, SOLUTION INTRAVENOUS; SUBCUTANEOUS at 23:47

## 2024-04-22 RX ADMIN — CEFAZOLIN 3000 MG: 2 INJECTION, POWDER, FOR SOLUTION INTRAMUSCULAR; INTRAVENOUS at 13:28

## 2024-04-22 RX ADMIN — ROCURONIUM BROMIDE 70 MG: 10 INJECTION INTRAVENOUS at 13:08

## 2024-04-22 RX ADMIN — GABAPENTIN 600 MG: 300 CAPSULE ORAL at 08:35

## 2024-04-22 RX ADMIN — PHENYLEPHRINE HYDROCHLORIDE 150 MCG: 10 INJECTION INTRAVENOUS at 14:01

## 2024-04-22 RX ADMIN — PHENYLEPHRINE HYDROCHLORIDE 100 MCG: 10 INJECTION INTRAVENOUS at 14:30

## 2024-04-22 RX ADMIN — Medication 5 ML: at 21:15

## 2024-04-22 RX ADMIN — INSULIN LISPRO 4 UNITS: 100 INJECTION, SOLUTION INTRAVENOUS; SUBCUTANEOUS at 21:19

## 2024-04-22 RX ADMIN — HYDROMORPHONE HYDROCHLORIDE 1 MG: 1 INJECTION, SOLUTION INTRAMUSCULAR; INTRAVENOUS; SUBCUTANEOUS at 02:30

## 2024-04-22 RX ADMIN — PHENYLEPHRINE HYDROCHLORIDE 150 MCG: 10 INJECTION INTRAVENOUS at 13:46

## 2024-04-22 RX ADMIN — PANTOPRAZOLE SODIUM 40 MG: 40 TABLET, DELAYED RELEASE ORAL at 05:09

## 2024-04-22 ASSESSMENT — PAIN SCALES - GENERAL
PAINLEVEL_OUTOF10: 0
PAINLEVEL_OUTOF10: 8
PAINLEVEL_OUTOF10: 0
PAINLEVEL_OUTOF10: 5
PAINLEVEL_OUTOF10: 10

## 2024-04-22 ASSESSMENT — PAIN DESCRIPTION - DESCRIPTORS
DESCRIPTORS: ACHING
DESCRIPTORS: SORE
DESCRIPTORS: ACHING

## 2024-04-22 ASSESSMENT — PAIN DESCRIPTION - LOCATION
LOCATION: BACK

## 2024-04-22 NOTE — ANESTHESIA PROCEDURE NOTES
Peripheral Block    Patient location during procedure: pre-op  Reason for block: post-op pain management and at surgeon's request  Start time: 4/22/2024 11:30 AM  End time: 4/22/2024 11:37 AM  Staffing  Performed: anesthesiologist   Anesthesiologist: Clarence Jewell MD  Performed by: Clarence Jewell MD  Authorized by: Clarence Jewell MD    Preanesthetic Checklist  Completed: patient identified, IV checked, site marked, risks and benefits discussed, surgical/procedural consents, equipment checked, pre-op evaluation, timeout performed, anesthesia consent given, oxygen available and monitors applied/VS acknowledged  Peripheral Block   Patient position: sitting  Prep: ChloraPrep  Provider prep: mask and sterile gloves (Sterile probe cover)  Patient monitoring: cardiac monitor, continuous pulse ox, frequent blood pressure checks and IV access  Block type: Erector spinae  Laterality: bilateral  Injection technique: single-shot  Guidance: ultrasound guided  Local infiltration: ropivacaine (20 ml of NS added)  Infiltration strength: 0.5 %  Local infiltration: ropivacaine (20 ml of NS added)  Dose: 30 mL    Needle   Needle type: combined needle/nerve stimulator   Needle gauge: 21 G  Needle localization: anatomical landmarks and ultrasound guidance  Needle length: 10 cm  Assessment   Injection assessment: negative aspiration for heme, no paresthesia on injection and local visualized surrounding nerve on ultrasound  Paresthesia pain: immediately resolved  Slow fractionated injection: yes  Hemodynamics: stable    Additional Notes  Ultrasound image printed and saved in patient chart.    Sterile probe cover used

## 2024-04-22 NOTE — DISCHARGE INSTRUCTIONS
Every day after surgery change dressing frequently to keep wound clean and dry using 4X4 gauze pads and paper tape.    May shower in 3 days postoperative.    Do not soak or soap wound for one week post operative.    Discharge prescriptions e-prescribed to pharmacy.  Order done  as outpatient.  Rx [place] 5/325 #[address]    Recheck one month.    Questions call office .

## 2024-04-22 NOTE — ANESTHESIA POSTPROCEDURE EVALUATION
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    Narendra Phase I: Narendra Score: 10    Narendra Phase II:      Anesthesia Post Evaluation    Patient location during evaluation: PACU  Patient participation: complete - patient participated  Level of consciousness: awake and alert  Pain score: 0  Airway patency: patent  Nausea & Vomiting: no vomiting and no nausea  Cardiovascular status: hemodynamically stable  Respiratory status: face mask  Hydration status: stable  Multimodal analgesia pain management approach  Pain management: adequate    No notable events documented.

## 2024-04-22 NOTE — ANESTHESIA PRE PROCEDURE
Department of Anesthesiology  Preprocedure Note       Name:  Will Bravo   Age:  63 y.o.  :  1960                                          MRN:  [id number]         Date:  2024      Surgeon: Surgeon(s):  Alda Kingston MD    Procedure: Procedure(s):  L4-5 DECOMPRESSION    Medications prior to admission:   Prior to Admission medications    Medication Sig Start Date End Date Taking? Authorizing Provider   carvedilol (COREG) 6.25 MG tablet Take 1 tablet by mouth 2 times daily 24   Yudy Archer MD   Icosapent Ethyl (VASCEPA) 1 g CAPS capsule Take 2 g by mouth 2 times daily (with meals) 10/19/15   Yudy Archer MD   Insulin Glargine, 2 Unit Dial, 300 UNIT/ML SOPN Inject 94 Units into the skin 2 times daily 3/8/24   Yudy Archer MD   HUMALOG KWIKPEN 200 UNIT/ML SOPN pen Inject 35 Units into the skin 3 times daily (with meals) 3/8/24   Yudy Archer MD   omeprazole (PRILOSEC) 40 MG delayed release capsule Take by mouth daily 3/5/24   Yudy Archer MD   hydroCHLOROthiazide (HYDRODIURIL) 25 MG tablet Take 1 tablet by mouth every morning (before breakfast) 24   Yudy Archer MD   losartan (COZAAR) 100 MG tablet Take 1 tablet by mouth 24   Yudy Archer MD   amLODIPine (NORVASC) 10 MG tablet Take 1 tablet by mouth 24   Yudy Archer MD   atorvastatin (LIPITOR) 20 MG tablet Take 1 tablet by mouth 10/1/15   Yudy Archer MD   fenofibrate micronized (LOFIBRA) 134 MG capsule Take 1 tablet by mouth nightly 23   Yudy Archer MD   lidocaine (LIDODERM) 5 % Place 1 patch onto the skin daily 24   Yudy Archer MD   HYDROcodone-acetaminophen (NORCO) 5-325 MG per tablet Take 1 tablet by mouth every 6 hours as needed.  Patient not taking: Reported on 2024   Yudy Archer MD   cyclobenzaprine (FLEXERIL) 10 MG tablet Take 1 tablet by mouth 2 times daily as needed 24   Yudy Archer MD

## 2024-04-22 NOTE — BRIEF OP NOTE
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) [M48.061, G99.2]    Post-Op Diagnosis: Same       Procedure(s):  L4-5 MICRODISECTOMY DECOMPRESSION    Surgeon(s):  Alda Kingston MD    Assistant:  Physician Assistant: Mely Jeffries PA-C    Anesthesia: General    Estimated Blood Loss (mL): less than 50     Complications: None        Drains:   Closed/Suction Drain Inferior Back Accordion (Active)       Findings:  Infection Present At Time Of Surgery (PATOS) (choose all levels that have infection present):  No infection present  Other Findings: Severe canal stenosis with right-sided protruded disc lumbar 4 5    Electronically signed by ALDA KINGSTON MD on 4/22/2024 at 3:11 PM

## 2024-04-22 NOTE — DISCHARGE INSTR - COC
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 (Formerly [facility]) M48.061, G99.2    Class 3 severe obesity due to excess calories with serious comorbidity and body mass index (BMI) of 40.0 to 44.9 in adult (Formerly [facility]) E66.01, Z68.41    Spinal stenosis of lumbar region with neurogenic claudication M48.062    Poorly controlled diabetes mellitus (Formerly [facility]) E11.65    Right lumbar radiculitis M54.16       Isolation/Infection:   Isolation            No Isolation          Patient Infection Status       None to display            Nurse Assessment:  Last Vital Signs: BP (!) 154/90   Pulse 84   Temp 97.5 °F (36.4 °C) (Oral)   Resp 18   Ht 1.778 m (5' 10\")   Wt 127 kg (280 lb)   SpO2 97%   BMI 40.18 kg/m²     Last documented pain score (0-10 scale): Pain Level: 10  Last Weight:   Wt Readings from Last 1 Encounters:   04/18/24 127 kg (280 lb)     Mental Status:  {IP PT MENTAL STATUS:20030}    IV Access:  { JUNI IV ACCESS:268425024}    Nursing Mobility/ADLs:  Walking   {CHP DME ADLs:264883767}  Transfer  {CHP DME ADLs:843065485}  Bathing  {CHP DME ADLs:524743893}  Dressing  {CHP DME ADLs:193818041}  Toileting  {CHP DME ADLs:011180513}  Feeding  {P DME ADLs:064115487}  Med Admin  {P DME ADLs:080222969}  Med Delivery   { JUNI MED Delivery:800773321}    Wound Care Documentation and Therapy:        Elimination:  Continence:   Bowel: {YES / NO:19727}  Bladder: {YES / NO:19727}  Urinary Catheter: {Urinary Catheter:315229180}   Colostomy/Ileostomy/Ileal Conduit: {YES / NO:19727}       Date of Last BM: ***    Intake/Output Summary (Last 24 hours) at 4/22/2024 1231  Last data filed at 4/22/2024 1047  Gross per 24 hour   Intake --   Output 200 ml   Net -200 ml     I/O last 3 completed shifts:  In: -   Out: 250 [Urine:250]    Safety Concerns:     { JUNI Safety Concerns:574089987}    Impairments/Disabilities:      { JUNI Impairments/Disabilities:431373728}    Nutrition Therapy:  Current

## 2024-04-23 VITALS
WEIGHT: 280 LBS | SYSTOLIC BLOOD PRESSURE: 144 MMHG | RESPIRATION RATE: 18 BRPM | DIASTOLIC BLOOD PRESSURE: 86 MMHG | HEIGHT: 70 IN | TEMPERATURE: 97.9 F | OXYGEN SATURATION: 99 % | BODY MASS INDEX: 40.09 KG/M2 | HEART RATE: 93 BPM

## 2024-04-23 PROBLEM — G89.18 POST-OP PAIN: Status: ACTIVE | Noted: 2024-04-23

## 2024-04-23 LAB
GLUCOSE BLD-MCNC: 229 MG/DL (ref 70–99)
GLUCOSE BLD-MCNC: 332 MG/DL (ref 70–99)
GLUCOSE BLD-MCNC: 366 MG/DL (ref 70–99)
GLUCOSE BLD-MCNC: 379 MG/DL (ref 70–99)
PERFORMED ON: ABNORMAL

## 2024-04-23 PROCEDURE — 6370000000 HC RX 637 (ALT 250 FOR IP)

## 2024-04-23 PROCEDURE — 6360000002 HC RX W HCPCS: Performed by: NEUROLOGICAL SURGERY

## 2024-04-23 PROCEDURE — 6370000000 HC RX 637 (ALT 250 FOR IP): Performed by: INTERNAL MEDICINE

## 2024-04-23 PROCEDURE — 99221 1ST HOSP IP/OBS SF/LOW 40: CPT | Performed by: PHYSICAL MEDICINE & REHABILITATION

## 2024-04-23 PROCEDURE — 97161 PT EVAL LOW COMPLEX 20 MIN: CPT

## 2024-04-23 PROCEDURE — 6360000002 HC RX W HCPCS

## 2024-04-23 PROCEDURE — 6370000000 HC RX 637 (ALT 250 FOR IP): Performed by: NEUROLOGICAL SURGERY

## 2024-04-23 PROCEDURE — 2700000000 HC OXYGEN THERAPY PER DAY

## 2024-04-23 PROCEDURE — 6370000000 HC RX 637 (ALT 250 FOR IP): Performed by: PAIN MEDICINE

## 2024-04-23 PROCEDURE — 97116 GAIT TRAINING THERAPY: CPT

## 2024-04-23 PROCEDURE — 2580000003 HC RX 258: Performed by: NEUROLOGICAL SURGERY

## 2024-04-23 PROCEDURE — 97165 OT EVAL LOW COMPLEX 30 MIN: CPT

## 2024-04-23 RX ORDER — INSULIN LISPRO 100 [IU]/ML
20 INJECTION, SOLUTION INTRAVENOUS; SUBCUTANEOUS ONCE
Status: COMPLETED | OUTPATIENT
Start: 2024-04-23 | End: 2024-04-23

## 2024-04-23 RX ORDER — INSULIN GLARGINE 100 [IU]/ML
68 INJECTION, SOLUTION SUBCUTANEOUS 2 TIMES DAILY
Status: DISCONTINUED | OUTPATIENT
Start: 2024-04-23 | End: 2024-04-23 | Stop reason: HOSPADM

## 2024-04-23 RX ORDER — BISACODYL 5 MG/1
5 TABLET, DELAYED RELEASE ORAL DAILY
Status: ON HOLD | COMMUNITY
Start: 2024-04-24

## 2024-04-23 RX ORDER — OXYCODONE HYDROCHLORIDE AND ACETAMINOPHEN 5; 325 MG/1; MG/1
1 TABLET ORAL EVERY 4 HOURS PRN
Qty: 20 TABLET | Refills: 0 | Status: SHIPPED | OUTPATIENT
Start: 2024-04-23 | End: 2024-04-27

## 2024-04-23 RX ADMIN — OXYCODONE AND ACETAMINOPHEN 1 TABLET: 5; 325 TABLET ORAL at 16:01

## 2024-04-23 RX ADMIN — INSULIN LISPRO 20 UNITS: 100 INJECTION, SOLUTION INTRAVENOUS; SUBCUTANEOUS at 12:34

## 2024-04-23 RX ADMIN — GABAPENTIN 600 MG: 300 CAPSULE ORAL at 09:19

## 2024-04-23 RX ADMIN — OXYCODONE 5 MG: 5 TABLET ORAL at 09:19

## 2024-04-23 RX ADMIN — ENOXAPARIN SODIUM 30 MG: 100 INJECTION SUBCUTANEOUS at 09:19

## 2024-04-23 RX ADMIN — ACETAMINOPHEN 650 MG: 325 TABLET ORAL at 06:21

## 2024-04-23 RX ADMIN — PANTOPRAZOLE SODIUM 40 MG: 40 TABLET, DELAYED RELEASE ORAL at 06:21

## 2024-04-23 RX ADMIN — AMLODIPINE BESYLATE 10 MG: 10 TABLET ORAL at 09:19

## 2024-04-23 RX ADMIN — Medication 10 ML: at 09:21

## 2024-04-23 RX ADMIN — INSULIN LISPRO 20 UNITS: 100 INJECTION, SOLUTION INTRAVENOUS; SUBCUTANEOUS at 01:42

## 2024-04-23 RX ADMIN — INSULIN LISPRO 12 UNITS: 100 INJECTION, SOLUTION INTRAVENOUS; SUBCUTANEOUS at 12:35

## 2024-04-23 RX ADMIN — HYDROMORPHONE HYDROCHLORIDE 0.5 MG: 1 INJECTION, SOLUTION INTRAMUSCULAR; INTRAVENOUS; SUBCUTANEOUS at 01:43

## 2024-04-23 RX ADMIN — CARVEDILOL 6.25 MG: 3.12 TABLET, FILM COATED ORAL at 09:19

## 2024-04-23 RX ADMIN — BISACODYL 5 MG: 5 TABLET, COATED ORAL at 09:19

## 2024-04-23 RX ADMIN — SODIUM CHLORIDE 3000 MG: 9 INJECTION, SOLUTION INTRAVENOUS at 05:02

## 2024-04-23 RX ADMIN — INSULIN LISPRO 20 UNITS: 100 INJECTION, SOLUTION INTRAVENOUS; SUBCUTANEOUS at 09:22

## 2024-04-23 RX ADMIN — INSULIN LISPRO 4 UNITS: 100 INJECTION, SOLUTION INTRAVENOUS; SUBCUTANEOUS at 09:20

## 2024-04-23 RX ADMIN — KETOROLAC TROMETHAMINE 30 MG: 30 INJECTION INTRAMUSCULAR; INTRAVENOUS at 06:21

## 2024-04-23 RX ADMIN — ACETAMINOPHEN 650 MG: 325 TABLET ORAL at 12:33

## 2024-04-23 RX ADMIN — INSULIN GLARGINE 60 UNITS: 100 INJECTION, SOLUTION SUBCUTANEOUS at 09:21

## 2024-04-23 RX ADMIN — OXYCODONE 5 MG: 5 TABLET ORAL at 12:34

## 2024-04-23 RX ADMIN — GABAPENTIN 600 MG: 300 CAPSULE ORAL at 12:34

## 2024-04-23 ASSESSMENT — ENCOUNTER SYMPTOMS
EYE PAIN: 0
NAUSEA: 0
EYE REDNESS: 0
STRIDOR: 0
CONSTIPATION: 1
WHEEZING: 0
PHOTOPHOBIA: 0
DIARRHEA: 0
BOWEL INCONTINENCE: 0
SHORTNESS OF BREATH: 1
BLOOD IN STOOL: 0
COUGH: 0
ABDOMINAL PAIN: 0
VOMITING: 0
SORE THROAT: 0
BACK PAIN: 1

## 2024-04-23 ASSESSMENT — PAIN SCALES - GENERAL
PAINLEVEL_OUTOF10: 9
PAINLEVEL_OUTOF10: 4
PAINLEVEL_OUTOF10: 7
PAINLEVEL_OUTOF10: 4
PAINLEVEL_OUTOF10: 5

## 2024-04-23 ASSESSMENT — PAIN DESCRIPTION - ORIENTATION
ORIENTATION: RIGHT
ORIENTATION: RIGHT

## 2024-04-23 ASSESSMENT — PAIN DESCRIPTION - LOCATION
LOCATION: BACK
LOCATION: LEG
LOCATION: BACK

## 2024-04-23 ASSESSMENT — PAIN DESCRIPTION - DESCRIPTORS
DESCRIPTORS: ACHING
DESCRIPTORS: ACHING;SHARP
DESCRIPTORS: ACHING;SHARP

## 2024-04-23 NOTE — OP NOTE
[facility]                   [address] [place] [address]                            OPERATIVE REPORT      PATIENT NAME: JASBIR JASON               : 1960  MED REC NO: [id number]                        ROOM: W280  ACCOUNT NO: [id number]                       ADMIT DATE: 2024  PROVIDER: Alda Kingston MD      DATE OF PROCEDURE:      SURGEON:  Alda Kingston MD    PREOPERATIVE DIAGNOSIS:  L3-4 canal stenosis with myelopathy.    POSTOPERATIVE DIAGNOSIS:  L3-4 canal stenosis with myelopathy.    OPERATION PERFORMED:  Right bilateral L3-4 microdecompression diskectomy.    DESCRIPTION OF PROCEDURE:  The patient was given general endotracheal anesthesia in supine position and turned to prone position.  Back was prepped and draped.  Time-out, patient identified.  Needle was placed.  Lateral x-rays were obtained to confirm level.  Needles were withdrawn.  Ancef 3 g IV.  The patient is morbidly obese.  BMI 41.  Skin infiltrated with lidocaine with epinephrine.  Skin incision made over the tip of the spinous processes of L4-5.  Dissection was carried down through subcutaneous fat, measuring 5 cm in depth to the fascia, which was then excised just to the right side of the spinous process tip of L4, superior aspect of L5 spinous processes.  The spinous processes of lamina and facet joint complex at L4-5 on the right side exposed.  Micro retractor was placed.  A needle was placed.  Lateral x-rays obtained to confirm level.  Needle was withdrawn.  With use of the microscope, a partial hemilaminectomy of the inferior aspect of L4 was performed on the right side working superiorly not laterally doing medial facetectomy, detaching the hypertrophied ligamentum flavum, doing a small superior laminectomy of the lamina of L5.  This is to remove the facet overgrowth and hypertrophied ligamentum flavum causing the pressure on the dural sac.  Next, the microscope was then angled over the dorsal

## 2024-04-23 NOTE — CARE COORDINATION
Case Management Assessment  Initial Evaluation    Date/Time of Evaluation: 4/19/2024 11:00 AM  Assessment Completed by: Tara David RN    If patient is discharged prior to next notation, then this note serves as note for discharge by case management.    Patient Name: Will Bravo                   YOB: 1960  Diagnosis: Unable to ambulate [R26.2]  Spinal stenosis of lumbar region with neurogenic claudication [M48.062]  Lumbar stenosis with neurogenic claudication [M48.062]  Right lumbar radiculitis [M54.16]                   Date / Time: 4/18/2024  4:44 PM    Patient Admission Status: Inpatient   Readmission Risk (Low < 19, Mod (19-27), High > 27): Readmission Risk Score: 9.5    Current PCP: Magaly Nolan MD  PCP verified by CM? Yes    Chart Reviewed: Yes      History Provided by: Patient, Spouse  Patient Orientation: Alert and Oriented    Patient Cognition: Alert    Hospitalization in the last 30 days (Readmission):  No    If yes, Readmission Assessment in CM Navigator will be completed.    Advance Directives:      Code Status: Full Code   Patient's Primary Decision Maker is:        Discharge Planning:    Patient lives with:   Type of Home:    Primary Care Giver: Self  Patient Support Systems include: Spouse/Significant Other   Current Financial resources:    Current community resources:    Current services prior to admission:              Current DME:  [facility]            Type of Home Care services:       ADLS  Prior functional level: Independent in ADLs/IADLs  Current functional level: Assistance with the following:, Mobility, Cooking, Housework, Shopping (BACK PAIN LIMITING MOBILITY.)    PT AM-PAC:   /24  OT AM-PAC:   /24    Family can provide assistance at DC: Yes  Would you like Case Management to discuss the discharge plan with any other family members/significant others, and if so, who? Yes (WIFE)  Plans to Return to Present Housing: Unknown at present (WILL NEED POST OP PT/OT)  Other 
LSW  MET WITH THE PT AND HIS WIFE AT BEDSIDE TO DISCUSS THE DC PLAN.  PT DOES NOT FEEL THAT HE WILL NEED TO GO TO  REHAB AS HE DID WELL WITH THERAPY TODAY.  PT AND WIFE AGREE THAT HOME WILL BE THE PLAN FOR TODAY.  PT REQUESTED A WHEELED WALKER FOR HOME AS HE ONLY HAS A CANE AT HOME AT THIS TIME.  LSW TO PROVIDE WALKER THROUGH [employer]      Lsw spoke with wife and pt.  They would like HHC at [place].  FOC given and they chose [facility].  Referral was called to Mary at [facility].   
PT TO OR TODAY. WILL NEED PT/OT POST OP. REFERRAL WAS SENT TO [facility] ON FRI. THEY ARE FOLLOWING.   
Referral was called to [facility] rehab and information faxed to Angela at  rehab.  (P) [phone] (F) [phone]  
IntraVENous, Q6H    insulin glargine, 60 Units, SubCUTAneous, BID    insulin lispro, 20 Units, SubCUTAneous, TID WC    gabapentin, 600 mg, Oral, 4x Daily    sodium chloride flush, 5-40 mL, IntraVENous, 2 times per day    enoxaparin, 30 mg, SubCUTAneous, BID    insulin lispro, 0-16 Units, SubCUTAneous, TID WC    insulin lispro, 0-4 Units, SubCUTAneous, Nightly    amLODIPine, 10 mg, Oral, Daily    carvedilol, 6.25 mg, Oral, BID    lidocaine, 1 patch, Topical, Daily    pantoprazole, 40 mg, Oral, QAM AC    PRN:  sodium chloride flush, sodium chloride, oxyCODONE **OR** oxyCODONE, HYDROmorphone **OR** HYDROmorphone, magnesium hydroxide, polyethylene glycol, oxyCODONE-acetaminophen, sodium chloride flush, potassium chloride **OR** potassium alternative oral replacement **OR** potassium chloride, magnesium sulfate, ondansetron **OR** ondansetron, acetaminophen **OR** [DISCONTINUED] acetaminophen, glucose, dextrose bolus **OR** dextrose bolus, glucagon (rDNA), dextrose    Allergies:  No Known Allergies      Most Recent Vitals, Height and Weight  BP (!) 144/86   Pulse 93   Temp 97.9 °F (36.6 °C) (Oral)   Resp 16   Ht 1.778 m (5' 10\")   Wt 127 kg (280 lb)   SpO2 99%   BMI 40.18 kg/m²     Weight Bearing Restrictions: noone       Current Diet Order: ADULT DIET; Regular    Skin: back incision   Wound Care Documentation:          Lungs:   Respiratory  Respiratory Pattern: Regular  Respiratory Depth: Normal  Respiratory Quality/Effort: Unlabored, Snoring  Chest Assessment: Chest expansion symmetrical  L Breath Sounds: Clear  R Breath Sounds: Clear      Cognition and Behavior:  Language Preference (if other than English):      Alertness/Behavior  Neuro (WDL): Within Defined Limits  Level of Consciousness: Alert (0)  History of Falling: No      Short Term Memory Deficits     History of Falling: No    Safety          Prior Level of Function and Living Arrangements:     Living Arrangements: Spouse/Significant Other  Support

## 2024-04-23 NOTE — DISCHARGE SUMMARY
bisacodyl (DULCOLAX) 5 MG EC tablet Take 1 tablet by mouth daily           CONTINUE these medications which have CHANGED    Details   oxyCODONE-acetaminophen (PERCOCET) 5-325 MG per tablet Take 1 tablet by mouth every 4 hours as needed for Pain for up to 5 days. Max Daily Amount: 6 tablets  Qty: 20 tablet, Refills: 0    Comments: Reduce doses taken as pain becomes manageable  Associated Diagnoses: Spinal stenosis of lumbar region with neurogenic claudication           CONTINUE these medications which have NOT CHANGED    Details   HYDROcodone-acetaminophen (NORCO) 5-325 MG per tablet Take 1 tablet by mouth every 6 hours as needed for Pain for up to 14 days. Intended supply: 14  days. 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 (HCC)      carvedilol (COREG) 6.25 MG tablet Take 1 tablet by mouth 2 times daily      Icosapent Ethyl (VASCEPA) 1 g CAPS capsule Take 2 g by mouth 2 times daily (with meals)      Insulin Glargine, 2 Unit Dial, 300 UNIT/ML SOPN Inject 94 Units into the skin 2 times daily      HUMALOG KWIKPEN 200 UNIT/ML SOPN pen Inject 35 Units into the skin 3 times daily (with meals)      omeprazole (PRILOSEC) 40 MG delayed release capsule Take by mouth daily      hydroCHLOROthiazide (HYDRODIURIL) 25 MG tablet Take 1 tablet by mouth every morning (before breakfast)      losartan (COZAAR) 100 MG tablet Take 1 tablet by mouth      amLODIPine (NORVASC) 10 MG tablet Take 1 tablet by mouth      atorvastatin (LIPITOR) 20 MG tablet Take 1 tablet by mouth      fenofibrate micronized (LOFIBRA) 134 MG capsule Take 1 tablet by mouth nightly      lidocaine (LIDODERM) 5 % Place 1 patch onto the skin daily      cyclobenzaprine (FLEXERIL) 10 MG tablet Take 1 tablet by mouth 2 times daily as needed      gabapentin (NEURONTIN) 300

## 2024-04-23 NOTE — CONSULTS
[facility]                   [address] [place] [address]                              CONSULTATION      PATIENT NAME: JASBIR JASON               : 1960  MED REC NO: [id number]                        ROOM: W280  ACCOUNT NO: [id number]                       ADMIT DATE: 2024  PROVIDER: Kenny Deng MD    ENDOCRINE CONSULT    REFERRING PHYSICIAN:  REUBEN Bowser - CNP      REASON FOR CONSULT:  Management of uncontrolled type 2 diabetes.    CHIEF COMPLAINT AND HISTORY OF PRESENT ILLNESS:  The patient is a 63-year-old male with known history of diabetes followed up through the [facility], admitted with leg pain getting worse with aching and shooting pain, history of hypertension, obesity, hyperlipidemia.  Symptoms started 3 weeks ago.  The patient is seen by Dr. Nichols, Neurosurgery.  Initial admitting diagnosis was spinal stenosis with neurogenic claudication.  The patient did get a dose of Decadron yesterday 10 mg.  Blood sugars are staying between 200 to 360 range.  Hemoglobin A1c was not done here, but he had A1c done through the [facility] was 9.9.  The patient is on basal bolus regimen at home.  History of also obesity BMI at 40.  Home medications for diabetes included Humalog 35 units twice daily, glimepiride 4 mg daily, glargine 94 units twice daily.  The patient is here on Lantus 30 units twice daily plus Humalog coverage.  Chemistries were reviewed.  Sodium 134, potassium 5.0, chloride was 95, CO2 was 25, BUN 28, creatinine was 1.10.  Liver enzymes slightly elevated.  ALT was 62, AST was 48.    PAST MEDICAL HISTORY:  Significant for type 2 diabetes, hypertension, pancreatitis in .    SURGICAL HISTORY:  Cryoablation of renal tumor, knee arthroscopic surgery, skin graft, spinal surgery in , and tonsillectomy.    FAMILY HISTORY:  Significant for arthritis, heart disease, diabetes, and hyperlipidemia.    PERSONAL AND SOCIAL HISTORY:  
Department of Chronic Pain Managment  Attending Progress Note      SUBJECTIVE  Low back pain    OBJECTIVE: Pt c/o Low back and Rt Leg pain 3-4 week duration evaluated by surgeon and scheduled for Lumbar Decompression in 3 days on Dilaudid 1mg q 3Hrs with poor pain controll    Medications  Current Facility-Administered Medications: gabapentin (NEURONTIN) capsule 600 mg, 600 mg, Oral, 4x Daily  oxyCODONE-acetaminophen (PERCOCET) 5-325 MG per tablet 1 tablet, 1 tablet, Oral, Q4H PRN  insulin glargine (LANTUS) injection vial 50 Units, 50 Units, SubCUTAneous, BID  insulin lispro (HUMALOG) injection vial 16 Units, 16 Units, SubCUTAneous, TID WC  sodium chloride flush 0.9 % injection 5-40 mL, 5-40 mL, IntraVENous, 2 times per day  sodium chloride flush 0.9 % injection 5-40 mL, 5-40 mL, IntraVENous, PRN  0.9 % sodium chloride infusion, , IntraVENous, PRN  potassium chloride (KLOR-CON M) extended release tablet 40 mEq, 40 mEq, Oral, PRN **OR** potassium bicarb-citric acid (EFFER-K) effervescent tablet 40 mEq, 40 mEq, Oral, PRN **OR** potassium chloride 10 mEq/100 mL IVPB (Peripheral Line), 10 mEq, IntraVENous, PRN  magnesium sulfate 2000 mg in 50 mL IVPB premix, 2,000 mg, IntraVENous, PRN  enoxaparin Sodium (LOVENOX) injection 30 mg, 30 mg, SubCUTAneous, BID  ondansetron (ZOFRAN-ODT) disintegrating tablet 4 mg, 4 mg, Oral, Q8H PRN **OR** ondansetron (ZOFRAN) injection 4 mg, 4 mg, IntraVENous, Q6H PRN  polyethylene glycol (GLYCOLAX) packet 17 g, 17 g, Oral, Daily PRN  acetaminophen (TYLENOL) tablet 650 mg, 650 mg, Oral, Q6H PRN **OR** [DISCONTINUED] acetaminophen (TYLENOL) suppository 650 mg, 650 mg, Rectal, Q6H PRN  glucose chewable tablet 16 g, 4 tablet, Oral, PRN  dextrose bolus 10% 125 mL, 125 mL, IntraVENous, PRN **OR** dextrose bolus 10% 250 mL, 250 mL, IntraVENous, PRN  glucagon injection 1 mg, 1 mg, SubCUTAneous, PRN  dextrose 10 % infusion, , IntraVENous, Continuous PRN  insulin lispro (HUMALOG) injection vial 0-16 
ear normal.      Nose: Nose normal.      Mouth/Throat:      Pharynx: Oropharynx is clear.   Eyes:      Extraocular Movements: Extraocular movements intact.   Cardiovascular:      Pulses: Normal pulses.   Pulmonary:      Effort: Pulmonary effort is normal.   Abdominal:      Palpations: Abdomen is soft.   Genitourinary:     Rectum: Normal.   Musculoskeletal:         General: Normal range of motion.      Cervical back: Normal range of motion.      Comments: Patient laying on the examining table when I came in.  Complaining and groaning of severe back and right lower extremity pain.  With assistance by his wife he is able to sit up on the edge of the examining table.  Has weakness in the right lower extremity 4/5 compared to the left.  Good range of motion of the knees and hips.  Sensation intact in both lower extremities.  Unable to stand without assistance.  Using the wheelchair.     Skin:     General: Skin is warm.   Neurological:      General: No focal deficit present.   Psychiatric:         Mood and Affect: Mood normal.            I have reviewed all laboratory studies, reports, data, and pertinent images.          HISTORY OF PRESENT ILLNESS:  3 to 4 weeks of very severe unremitting back and right lower extremity pain being admitted to  hospitals on Dilaudid every 3 hours tapering down to every 6 hours.  Was seen by Dr. Fernandez of orthopedic surgery and suggested continuing pain management physical therapy.  He was just discharged from the hospital sent home.  He is in a wheelchair he cannot take care of himself he cannot get up and go to the bathroom the wife cannot care for him.  He is unable to care for himself dress himself and needs assistance to go to the bathroom.  He is unable to stand to urinate.     4/15/2024 MRI lumbar spine  severe canal stenosis L4-5     Lumbar stenosis with myelopathy.    Class III severe obesity.  BMI 41  Primary hypertension  Diabetes mellitus type 2 insulin-dependent   
re  .    Focus of today's plan-   follow for consistency of function however it looks like he is probably okay to go home with home health care.      Required Certification Data (potential inpatient rehabilitation facility patient's only)    Deficits:high risk for falls, decreased endurance, and deconditioning     Disability:mobility, locomotion, and self care    Potential barriers to progress/discharge:complex medical conditions and severe pain         It was my pleasure to evaluate Will Bravo today.  Please call [phone] with questions.    Charlene Ruff, DO

## 2024-04-23 NOTE — PLAN OF CARE
Problem: Chronic Conditions and Co-morbidities  Goal: Patient's chronic conditions and co-morbidity symptoms are monitored and maintained or improved  4/23/2024 0053 by Osvaldo Enciso, RN  Outcome: Progressing     Problem: Safety - Adult  Goal: Free from fall injury  4/23/2024 0053 by Osvaldo Enciso, RN  Outcome: Progressing  Flowsheets (Taken 4/22/2024 1650 by Lourdes Oneal, RN)  Free From Fall Injury: Instruct family/caregiver on patient safety     
  Problem: Pain  Goal: Verbalizes/displays adequate comfort level or baseline comfort level  4/20/2024 0926 by Milton Meyer RN  Outcome: Progressing  4/20/2024 0131 by Nasim Velasquez RN  Outcome: Progressing     Problem: Chronic Conditions and Co-morbidities  Goal: Patient's chronic conditions and co-morbidity symptoms are monitored and maintained or improved  4/20/2024 0926 by Milton Meyer RN  Outcome: Progressing  4/20/2024 0131 by Nasim Velasquez RN  Outcome: Progressing     Problem: Safety - Adult  Goal: Free from fall injury  4/20/2024 0926 by Milton Meyer RN  Outcome: Progressing  4/20/2024 0131 by Nasim Velasquez RN  Outcome: Progressing     
  Problem: Pain  Goal: Verbalizes/displays adequate comfort level or baseline comfort level  4/21/2024 0937 by Milton Meyer RN  Outcome: Progressing  4/20/2024 2347 by Francisco Shukla RN  Outcome: Progressing     Problem: Chronic Conditions and Co-morbidities  Goal: Patient's chronic conditions and co-morbidity symptoms are monitored and maintained or improved  4/21/2024 0937 by Milton Meyer RN  Outcome: Progressing  4/20/2024 2347 by Francisco Shukla RN  Outcome: Progressing     Problem: Safety - Adult  Goal: Free from fall injury  4/21/2024 0937 by Milton Meyer RN  Outcome: Progressing  4/20/2024 2347 by Francisco Shukla RN  Outcome: Progressing     
  Problem: Pain  Goal: Verbalizes/displays adequate comfort level or baseline comfort level  Outcome: Progressing     Problem: Chronic Conditions and Co-morbidities  Goal: Patient's chronic conditions and co-morbidity symptoms are monitored and maintained or improved  Outcome: Progressing     Problem: Safety - Adult  Goal: Free from fall injury  Outcome: Progressing     
  Problem: Pain  Goal: Verbalizes/displays adequate comfort level or baseline comfort level  Outcome: Progressing     Problem: Chronic Conditions and Co-morbidities  Goal: Patient's chronic conditions and co-morbidity symptoms are monitored and maintained or improved  Outcome: Progressing     Problem: Safety - Adult  Goal: Free from fall injury  Outcome: Progressing  Flowsheets (Taken 4/22/2024 1650 by Lourdes Oneal RN)  Free From Fall Injury: Instruct family/caregiver on patient safety     
order or complex needs related to functional status, cognitive ability or social support system

## 2024-04-24 NOTE — PROGRESS NOTES
Hospitalist Daily Progress Note  Name: Will Bravo  Age: 63 y.o.  Gender: male  CodeStatus: Full Code  Allergies: No Known Allergies    Chief Complaint:Leg Pain      Primary Care Provider: Magaly Nolan MD    InpatientTreatment Team: Treatment Team: Attending Provider: Nasim Moraes MD; Consulting Physician: Kenny Deng MD; Consulting Physician: Alda Kingston MD; Surgeon: Alda Kingston MD; Consulting Physician: Jorge Tyler MD; : Isabella Conti, RN; Registered Nurse: Milton Meyer RN; : Sakina Willoughby    Admission Date: 4/18/2024      Subjective: No chest pain, sob, nausea.  Resting in bed, family at bedside.    Physical Exam  Vitals and nursing note reviewed.   Constitutional:       Appearance: Normal appearance.   Cardiovascular:      Rate and Rhythm: Normal rate and regular rhythm.   Pulmonary:      Effort: Pulmonary effort is normal.      Breath sounds: Normal breath sounds.   Abdominal:      General: Bowel sounds are normal.      Palpations: Abdomen is soft.   Musculoskeletal:         General: Normal range of motion.   Skin:     General: Skin is warm and dry.   Neurological:      Mental Status: He is alert and oriented to person, place, and time. Mental status is at baseline.         Medications:  Reviewed    Infusion Medications:    sodium chloride      dextrose       Scheduled Medications:    insulin glargine  60 Units SubCUTAneous BID    insulin lispro  20 Units SubCUTAneous TID WC    gabapentin  600 mg Oral 4x Daily    sodium chloride flush  5-40 mL IntraVENous 2 times per day    enoxaparin  30 mg SubCUTAneous BID    insulin lispro  0-16 Units SubCUTAneous TID WC    insulin lispro  0-4 Units SubCUTAneous Nightly    amLODIPine  10 mg Oral Daily    carvedilol  6.25 mg Oral BID    lidocaine  1 patch Topical Daily    pantoprazole  40 mg Oral QAM AC     PRN Meds: oxyCODONE-acetaminophen, sodium chloride flush, sodium chloride, potassium chloride **OR** potassium 
  Hospitalist Daily Progress Note  Name: Will Bravo  Age: 63 y.o.  Gender: male  CodeStatus: Full Code  Allergies: No Known Allergies    Chief Complaint:Leg Pain      Primary Care Provider: Magaly Nolan MD    InpatientTreatment Team: Treatment Team: Attending Provider: Nasim Moraes MD; Consulting Physician: Kenny Deng MD; Consulting Physician: Alda Kingston MD; Surgeon: Alda Kingston MD; Consulting Physician: Jorge Tyler MD; Patient Care Tech: Marietta Bashir; Registered Nurse: Lourdes Oneal RN; Utilization Reviewer: Sonal Hartman RN    Admission Date: 4/18/2024      Subjective: Patient resting in bed, post op, no cp, sob.  Pain controlled.    Physical Exam  Vitals and nursing note reviewed.   Constitutional:       Appearance: Normal appearance.   Cardiovascular:      Rate and Rhythm: Normal rate and regular rhythm.   Pulmonary:      Effort: Pulmonary effort is normal.      Breath sounds: Normal breath sounds.   Abdominal:      General: Bowel sounds are normal.      Palpations: Abdomen is soft.   Musculoskeletal:         General: Normal range of motion.   Skin:     General: Skin is warm and dry.   Neurological:      Mental Status: He is alert and oriented to person, place, and time. Mental status is at baseline.         Medications:  Reviewed    Infusion Medications:    lactated ringers IV soln 100 mL/hr at 04/22/24 1259    sod chloride IRR soln      sodium chloride      dextrose       Scheduled Medications:    insulin glargine  60 Units SubCUTAneous BID    insulin lispro  20 Units SubCUTAneous TID WC    gabapentin  600 mg Oral 4x Daily    sodium chloride flush  5-40 mL IntraVENous 2 times per day    enoxaparin  30 mg SubCUTAneous BID    insulin lispro  0-16 Units SubCUTAneous TID WC    insulin lispro  0-4 Units SubCUTAneous Nightly    amLODIPine  10 mg Oral Daily    carvedilol  6.25 mg Oral BID    lidocaine  1 patch Topical Daily    pantoprazole  40 mg Oral QAM AC     PRN Meds: 
Full note to follow, will increase Gabapentin and Add PO Pain meds  
Physical Therapy  Facility/Department: [facility] MED SURG W280/W280-01  Physical Therapy Discharge      NAME: Will Bravo    : 1960 (63 y.o.)  MRN: [id number]    Account: [id number]  Gender: male      Patient has been discharged from acute care hospital. DC patient from current PT program.      Electronically signed by Danielle Winter PT on 24 at 2:07 PM EDT    
Physical Therapy Med Surg Daily Treatment Note  Facility/Department: [address] ORTHO TELE  Room: [facility]       NAME: Will Bravo  : 1960 (63 y.o.)  MRN: [id number]  CODE STATUS: Full Code    Date of Service: 2024    Patient Diagnosis(es): Unable to ambulate [R26.2]  Spinal stenosis of lumbar region with neurogenic claudication [M48.062]  Lumbar stenosis with neurogenic claudication [M48.062]  Right lumbar radiculitis [M54.16]   Chief Complaint   Patient presents with    Leg Pain     Patient Active Problem List    Diagnosis Date Noted    Post-op pain- L4-5 MICRODISECTOMY DECOMPRESSION (Spine Lumbar) 2024    Impaired mobility and activities of daily living dt  L4-5 MICRODISECTOMY DECOMPRESSION (Spine Lumbar) 2024    Poorly controlled diabetes mellitus (HCC) 2024    Right lumbar radiculitis 2024    Myelopathy concurrent with and due to stenosis of lumbar spine (HCC) 2024    Class 3 severe obesity due to excess calories with serious comorbidity and body mass index (BMI) of 40.0 to 44.9 in adult (HCC) 2024    Spinal stenosis of lumbar region with neurogenic claudication 2024    Swelling of upper arm 2024    Strain of lumbar region 2024    History of renal cell cancer 2023    Portal hypertension (HCC) 2023    Obesity, Class III, BMI 40-49.9 (morbid obesity) (HCC) 2023    Situational depression 2022    Stress at home 2022    Hypercalcemia 2022    Hepatitis A immune 2022    Metabolic syndrome 2022    Diabetic nephropathy associated with type 2 diabetes mellitus (HCC) 2022    Albuminuria 2021    Cirrhosis of liver without ascites (HCC) 11/10/2020    Hepatic steatosis 11/10/2020    Chronic pain of right knee 2020    Esophageal disorder 2019    Low back pain 2016    Osteoarthritis of spine with radiculopathy, lumbar region 2016    Tear of meniscus of right knee 2016    
Physical Therapy Med Surg Initial Assessment  Facility/Department: [address] ORTHO TELE  Room: [facility]       NAME: Will Bravo  : 1960 (63 y.o.)  MRN: [id number]  CODE STATUS: Full Code    Date of Service: 2024    Patient Diagnosis(es): Unable to ambulate [R26.2]  Spinal stenosis of lumbar region with neurogenic claudication [M48.062]  Lumbar stenosis with neurogenic claudication [M48.062]  Right lumbar radiculitis [M54.16]   Chief Complaint   Patient presents with    Leg Pain     Patient Active Problem List    Diagnosis Date Noted    Post-op pain- L4-5 MICRODISECTOMY DECOMPRESSION (Spine Lumbar) 2024    Impaired mobility and activities of daily living dt  L4-5 MICRODISECTOMY DECOMPRESSION (Spine Lumbar) 2024    Poorly controlled diabetes mellitus (HCC) 2024    Right lumbar radiculitis 2024    Myelopathy concurrent with and due to stenosis of lumbar spine (HCC) 2024    Class 3 severe obesity due to excess calories with serious comorbidity and body mass index (BMI) of 40.0 to 44.9 in adult (HCC) 2024    Spinal stenosis of lumbar region with neurogenic claudication 2024    Swelling of upper arm 2024    Strain of lumbar region 2024    History of renal cell cancer 2023    Portal hypertension (HCC) 2023    Obesity, Class III, BMI 40-49.9 (morbid obesity) (HCC) 2023    Situational depression 2022    Stress at home 2022    Hypercalcemia 2022    Hepatitis A immune 2022    Metabolic syndrome 2022    Diabetic nephropathy associated with type 2 diabetes mellitus (HCC) 2022    Albuminuria 2021    Cirrhosis of liver without ascites (HCC) 11/10/2020    Hepatic steatosis 11/10/2020    Chronic pain of right knee 2020    Esophageal disorder 2019    Low back pain 2016    Osteoarthritis of spine with radiculopathy, lumbar region 2016    Tear of meniscus of right knee 2016    
Postoperative day #1 right and bilateral L4-5 microdecompression discectomy.  Patient is improved with ability to ambulate with walker with much less pain in the back and right lower extremity.  Wound is healing well.    Drain is minimal output plan removal today.  Evaluated by OT PT   rehabilitation consult for discharge options  
Progress Note  Date:2024       Room:80/W280-01  Patient Name:Will Bravo     YOB: 1960     Age:63 y.o.    Chief complaint uncontrolled type 2 diabetes    Subjective    Subjective   Review of Systems   Unable to perform ROS: Mental status change     Objective         Vitals Last 24 Hours:  TEMPERATURE:  Temp  Av.9 °F (36.6 °C)  Min: 97.5 °F (36.4 °C)  Max: 98.2 °F (36.8 °C)  RESPIRATIONS RANGE: Resp  Av.3  Min: 11  Max: 18  PULSE OXIMETRY RANGE: SpO2  Av.9 %  Min: 91 %  Max: 100 %  PULSE RANGE: Pulse  Av.6  Min: 73  Max: 104  BLOOD PRESSURE RANGE: Systolic (24hrs), Av , Min:126 , Max:154   ; Diastolic (24hrs), Av, Min:69, Max:90    I/O (24Hr):    Intake/Output Summary (Last 24 hours) at 2024  Last data filed at 2024 1534  Gross per 24 hour   Intake 1500 ml   Output 225 ml   Net 1275 ml     Objective:  General Appearance:  General patient appearance: Patient very sleepy postop.    Vital signs: (most recent): Blood pressure 126/72, pulse (!) 104, temperature 98.2 °F (36.8 °C), temperature source Oral, resp. rate 18, height 1.778 m (5' 10\"), weight 127 kg (280 lb), SpO2 100 %.  Vital signs are normal.    HEENT: Normal HEENT exam.    Lungs:  Normal effort.    Heart: Normal rate.    Extremities: Decreased range of motion.    Neurological: (Patient very drowsy).      Labs/Imaging/Diagnostics    Labs:  CBC:No results for input(s): \"WBC\", \"RBC\", \"HGB\", \"HCT\", \"MCV\", \"RDW\", \"PLT\" in the last 72 hours.  CHEMISTRIES:No results for input(s): \"NA\", \"K\", \"CL\", \"CO2\", \"BUN\", \"CREATININE\", \"GLUCOSE\", \"CA\", \"PHOS\", \"MG\" in the last 72 hours.  PT/INR:No results for input(s): \"PROTIME\", \"INR\" in the last 72 hours.  APTT:No results for input(s): \"APTT\" in the last 72 hours.  LIVER PROFILE:No results for input(s): \"AST\", \"ALT\", \"BILIDIR\", \"BILITOT\", \"ALKPHOS\" in the last 72 hours.    Imaging Last 24 Hours:  FLUORO FOR SURGICAL PROCEDURES    Result Date: 
Pt admits reasonable controll of pain , Trying to stay away from dilaudid, advised him to use it as needed but rely on PO as much as needed.  
Pt bs at hs 351 routine insulin per order MD notified recheck in 1 hr and 2 hr bs 366 order for 10 units humalog, recheck in 1 hr bs 379 , pt stated was drinking gatorade and outside food made aware of the importance of diet will monitor   
Will Bravo is a 63 y.o. male patient.  1. Spinal stenosis of lumbar region with neurogenic claudication    2. Right lumbar radiculitis    3. Unable to ambulate      Past Medical History:   Diagnosis Date    Hypertension     Pancreatitis 2011    Type II or unspecified type diabetes mellitus without mention of complication, not stated as uncontrolled      Current Facility-Administered Medications   Medication Dose Route Frequency Provider Last Rate Last Admin    gabapentin (NEURONTIN) capsule 600 mg  600 mg Oral 4x Daily Jorge Tyler MD   600 mg at 04/19/24 2031    oxyCODONE-acetaminophen (PERCOCET) 5-325 MG per tablet 1 tablet  1 tablet Oral Q4H PRN Jorge Tyler MD   1 tablet at 04/20/24 0503    insulin glargine (LANTUS) injection vial 50 Units  50 Units SubCUTAneous BID Kenny Deng MD   50 Units at 04/19/24 2033    insulin lispro (HUMALOG) injection vial 16 Units  16 Units SubCUTAneous TID WC Kenny Deng MD   16 Units at 04/19/24 1741    sodium chloride flush 0.9 % injection 5-40 mL  5-40 mL IntraVENous 2 times per day Borisov, Saknia, APRN - CNP   10 mL at 04/19/24 2033    sodium chloride flush 0.9 % injection 5-40 mL  5-40 mL IntraVENous PRN Borisov, Sakina, APRN - CNP   10 mL at 04/19/24 0529    0.9 % sodium chloride infusion   IntraVENous PRN Borisov, Sakina, APRN - CNP        potassium chloride (KLOR-CON M) extended release tablet 40 mEq  40 mEq Oral PRN Borisov, Sakina, APRN - CNP        Or    potassium bicarb-citric acid (EFFER-K) effervescent tablet 40 mEq  40 mEq Oral PRN Borisov, Sakina, APRN - CNP        Or    potassium chloride 10 mEq/100 mL IVPB (Peripheral Line)  10 mEq IntraVENous PRN Borisov, Sakina, APRN - CNP        magnesium sulfate 2000 mg in 50 mL IVPB premix  2,000 mg IntraVENous PRN Borisov, Sakina, APRN - CNP        enoxaparin Sodium (LOVENOX) injection 30 mg  30 mg SubCUTAneous BID Borisov, Sakina, APRN - CNP   30 mg at 04/19/24 2032    ondansetron (ZOFRAN-ODT) disintegrating 
flush, sodium chloride, potassium chloride **OR** potassium alternative oral replacement **OR** potassium chloride, magnesium sulfate, ondansetron **OR** ondansetron, polyethylene glycol, acetaminophen **OR** [DISCONTINUED] acetaminophen, glucose, dextrose bolus **OR** dextrose bolus, glucagon (rDNA), dextrose, HYDROmorphone    Labs:   Recent Labs     04/18/24  1700 04/19/24  0621   WBC 7.1 8.4   HGB 13.8* 14.0   HCT 40.0* 40.0*    247       Recent Labs     04/18/24  1700 04/19/24  0621   * 134*   K 4.5 5.0*   CL 94* 95   CO2 25 25   BUN 21 28*   CREATININE 0.82 1.10   CALCIUM 9.9 9.8       Recent Labs     04/18/24  1700 04/19/24  0621   AST 94* 48*   ALT 76* 62*   BILITOT 0.8* 0.6   ALKPHOS 49 48       Recent Labs     04/18/24 1700   INR 1.1       No results for input(s): \"CKTOTAL\", \"TROPONINI\" in the last 72 hours.    Urinalysis:   No results found for: \"NITRU\", \"WBCUA\", \"BACTERIA\", \"RBCUA\", \"BLOODU\", \"SPECGRAV\", \"GLUCOSEU\"    Radiology:   Most recent    Chest CT      WITH CONTRAST:No results found for this or any previous visit.       WITHOUT CONTRAST: No results found for this or any previous visit.      CXR      2-view: No results found for this or any previous visit.       Portable: No results found for this or any previous visit.      Echo No results found for this or any previous visit.            Assessment/Plan:    Active Hospital Problems    Diagnosis Date Noted    Poorly controlled diabetes mellitus (HCC) [E11.65] 04/19/2024    Right lumbar radiculitis [M54.16] 04/19/2024    Spinal stenosis of lumbar region with neurogenic claudication [M48.062] 04/18/2024     Acute Illnesses  Spinal stenosis of lumbar region with neurogenic claudication  Right lumbar radiculitis  Impaired mobility  Admit inpatient with telemetry  Preop screening: CBC, CMP, EKG, viral panel  Pain management  VS per unit routine  Up with assist  Fall precautions and safety  4/19 - await surgery 4/22 4/20 - pain well 
glycol, acetaminophen **OR** acetaminophen, glucose, dextrose bolus **OR** dextrose bolus, glucagon (rDNA), dextrose, HYDROmorphone    Labs:   Recent Labs     04/18/24  1700 04/19/24  0621   WBC 7.1 8.4   HGB 13.8* 14.0   HCT 40.0* 40.0*    247     Recent Labs     04/18/24  1700 04/19/24  0621   * 134*   K 4.5 5.0*   CL 94* 95   CO2 25 25   BUN 21 28*   CREATININE 0.82 1.10   CALCIUM 9.9 9.8     Recent Labs     04/18/24  1700 04/19/24  0621   AST 94* 48*   ALT 76* 62*   BILITOT 0.8* 0.6   ALKPHOS 49 48     Recent Labs     04/18/24  1700   INR 1.1     No results for input(s): \"CKTOTAL\", \"TROPONINI\" in the last 72 hours.    Urinalysis:   No results found for: \"NITRU\", \"WBCUA\", \"BACTERIA\", \"RBCUA\", \"BLOODU\", \"SPECGRAV\", \"GLUCOSEU\"    Radiology:   Most recent    Chest CT      WITH CONTRAST:No results found for this or any previous visit.       WITHOUT CONTRAST: No results found for this or any previous visit.      CXR      2-view: No results found for this or any previous visit.       Portable: No results found for this or any previous visit.      Echo No results found for this or any previous visit.            Assessment/Plan:    Active Hospital Problems    Diagnosis Date Noted    Lumbar stenosis with neurogenic claudication [M48.062] 04/18/2024     Acute Illnesses  Spinal stenosis of lumbar region with neurogenic claudication  Right lumbar radiculitis  Impaired mobility  Admit inpatient with telemetry  Preop screening: CBC, CMP, EKG, viral panel  Pain management  VS per unit routine  Up with assist  Fall precautions and safety  4/19 - await surgery 4/22     Chronic Illnesses  DM2  Latest blood glucose 261  Accu-Cheks ACHS  On insulin sliding scale coverage  On Lantus 76 units twice daily  With hypoglycemia treatment protocol  Diabetic and dietary education  Snack at bedtime to prevent hypoglycemia in the morning  Adjust and add medication as needed  A1c in a.m.     HTN  BP within acceptable range, latest 
and Standing Balance:  Balance  Sitting: Intact  Standing:  (Supervision with use of walker)    Functional Endurance:  Activity Tolerance  Activity Tolerance: Treatment limited secondary to medical complications (free text)    D/C Recommendations:  OT D/C RECOMMENDATIONS  REQUIRES OT FOLLOW-UP: Yes    Equipment Recommendations:       OT Education:        OT Follow Up:    OT D/C RECOMMENDATIONS  REQUIRES OT FOLLOW-UP: Yes       Assessment/Discharge Disposition:     Performance deficits / Impairments: Decreased ADL status, Decreased functional mobility , Decreased balance, Decreased sensation  Prognosis: Good  Discharge Recommendations: Continue to assess pending progress  Decision Making: Medium Complexity     History: moderate  Exam: 4 deficits  Assistance / Modification: min A    AMPA (Six Click) Self care Score    How much help is needed for putting on and taking off regular lower body clothing?: A Little  How much help is needed for bathing (which includes washing, rinsing, drying)?: A Little  How much help is needed for toileting (which includes using toilet, bedpan, or urinal)?: None  How much help is needed for putting on and taking off regular upper body clothing?: None  How much help is needed for taking care of personal grooming?: None  How much help for eating meals?: None  [facility] Inpatient Daily Activity Raw Score: 22  AM-PAC Inpatient ADL T-Scale Score : 47.1  ADL Inpatient CMS 0-100% Score: 25.8    Therapy key for assistance levels -   Independent/Mod I = Pt. is able to perform task with no assistance but may require a device   Stand by assistance = Pt. does not perform task at an independent level but does not need physical assistance, requires verbal cues  Minimal, Moderate, Maximal Assistance = Pt. requires physical assistance (25%, 50%, 75% assist from helper) for task but is able to actively participate in task   Dependent = Pt. requires total assistance with task and is not able to actively

## 2024-04-25 ENCOUNTER — TELEPHONE (OUTPATIENT)
Dept: PAIN MANAGEMENT | Age: 64
End: 2024-04-25

## 2024-04-25 NOTE — TELEPHONE ENCOUNTER
Pt's wife called and she says ever since pt had surgery he has not slept at all due to being in so much pain. They are wondering if there is any stronger meds that could be give to he with the pain so he could get some sleep

## 2024-04-26 DIAGNOSIS — M62.830 BACK SPASM: Primary | ICD-10-CM

## 2024-04-26 RX ORDER — CYCLOBENZAPRINE HCL 10 MG
10 TABLET ORAL 3 TIMES DAILY PRN
Qty: 90 TABLET | Refills: 0 | Status: ON HOLD | OUTPATIENT
Start: 2024-04-26 | End: 2024-05-26

## 2024-04-26 NOTE — TELEPHONE ENCOUNTER
Can cause a lot of postoperative pain and a muscle relaxant certainly helps to control back pain.  Flexeril has been prescribed

## 2024-04-27 ENCOUNTER — APPOINTMENT (OUTPATIENT)
Dept: CT IMAGING | Age: 64
End: 2024-04-27
Payer: MEDICARE

## 2024-04-27 ENCOUNTER — HOSPITAL ENCOUNTER (EMERGENCY)
Age: 64
Discharge: HOME | End: 2024-04-27
Payer: MEDICARE

## 2024-04-27 VITALS
SYSTOLIC BLOOD PRESSURE: 120 MMHG | TEMPERATURE: 97.7 F | OXYGEN SATURATION: 99 % | HEART RATE: 86 BPM | RESPIRATION RATE: 16 BRPM | DIASTOLIC BLOOD PRESSURE: 62 MMHG

## 2024-04-27 DIAGNOSIS — G89.18 POSTOPERATIVE PAIN AFTER SPINAL SURGERY: Primary | ICD-10-CM

## 2024-04-27 LAB
ALBUMIN SERPL-MCNC: 3.9 G/DL (ref 3.5–4.6)
ALP SERPL-CCNC: 59 U/L (ref 35–104)
ALT SERPL-CCNC: 37 U/L (ref 0–41)
ANION GAP SERPL CALCULATED.3IONS-SCNC: 15 MEQ/L (ref 9–15)
AST SERPL-CCNC: 33 U/L (ref 0–40)
BASOPHILS # BLD: 0 K/UL (ref 0–0.2)
BASOPHILS NFR BLD: 0.4 %
BILIRUB SERPL-MCNC: 0.6 MG/DL (ref 0.2–0.7)
BUN SERPL-MCNC: 27 MG/DL (ref 8–23)
CALCIUM SERPL-MCNC: 10.5 MG/DL (ref 8.5–9.9)
CHLORIDE SERPL-SCNC: 97 MEQ/L (ref 95–107)
CO2 SERPL-SCNC: 25 MEQ/L (ref 20–31)
CREAT SERPL-MCNC: 0.94 MG/DL (ref 0.7–1.2)
EOSINOPHIL # BLD: 0.5 K/UL (ref 0–0.7)
EOSINOPHIL NFR BLD: 5.3 %
ERYTHROCYTE [DISTWIDTH] IN BLOOD BY AUTOMATED COUNT: 13.2 % (ref 11.5–14.5)
GLOBULIN SER CALC-MCNC: 4.4 G/DL (ref 2.3–3.5)
GLUCOSE SERPL-MCNC: 124 MG/DL (ref 70–99)
HCT VFR BLD AUTO: 37 % (ref 42–52)
HGB BLD-MCNC: 12.5 G/DL (ref 14–18)
LYMPHOCYTES # BLD: 3.1 K/UL (ref 1–4.8)
LYMPHOCYTES NFR BLD: 31.3 %
MCH RBC QN AUTO: 30.3 PG (ref 27–31.3)
MCHC RBC AUTO-ENTMCNC: 33.8 % (ref 33–37)
MCV RBC AUTO: 89.6 FL (ref 79–92.2)
MONOCYTES # BLD: 0.8 K/UL (ref 0.2–0.8)
MONOCYTES NFR BLD: 8.1 %
NEUTROPHILS # BLD: 5.4 K/UL (ref 1.4–6.5)
NEUTS SEG NFR BLD: 54.6 %
PLATELET # BLD AUTO: 229 K/UL (ref 130–400)
POC CREATININE WHOLE BLOOD: 1
POTASSIUM SERPL-SCNC: 4.1 MEQ/L (ref 3.4–4.9)
PROT SERPL-MCNC: 8.3 G/DL (ref 6.3–8)
RBC # BLD AUTO: 4.13 M/UL (ref 4.7–6.1)
SODIUM SERPL-SCNC: 137 MEQ/L (ref 135–144)
WBC # BLD AUTO: 9.9 K/UL (ref 4.8–10.8)

## 2024-04-27 PROCEDURE — 96376 TX/PRO/DX INJ SAME DRUG ADON: CPT

## 2024-04-27 PROCEDURE — 6360000004 HC RX CONTRAST MEDICATION: Performed by: PERSONAL EMERGENCY RESPONSE ATTENDANT

## 2024-04-27 PROCEDURE — 96375 TX/PRO/DX INJ NEW DRUG ADDON: CPT

## 2024-04-27 PROCEDURE — 36415 COLL VENOUS BLD VENIPUNCTURE: CPT

## 2024-04-27 PROCEDURE — 99285 EMERGENCY DEPT VISIT HI MDM: CPT

## 2024-04-27 PROCEDURE — 80053 COMPREHEN METABOLIC PANEL: CPT

## 2024-04-27 PROCEDURE — 72132 CT LUMBAR SPINE W/DYE: CPT

## 2024-04-27 PROCEDURE — 85025 COMPLETE CBC W/AUTO DIFF WBC: CPT

## 2024-04-27 PROCEDURE — 6370000000 HC RX 637 (ALT 250 FOR IP): Performed by: PERSONAL EMERGENCY RESPONSE ATTENDANT

## 2024-04-27 PROCEDURE — 96374 THER/PROPH/DIAG INJ IV PUSH: CPT

## 2024-04-27 PROCEDURE — 6360000002 HC RX W HCPCS: Performed by: PERSONAL EMERGENCY RESPONSE ATTENDANT

## 2024-04-27 PROCEDURE — 2580000003 HC RX 258: Performed by: PERSONAL EMERGENCY RESPONSE ATTENDANT

## 2024-04-27 RX ORDER — ONDANSETRON 2 MG/ML
4 INJECTION INTRAMUSCULAR; INTRAVENOUS ONCE
Status: COMPLETED | OUTPATIENT
Start: 2024-04-27 | End: 2024-04-27

## 2024-04-27 RX ORDER — HYDROMORPHONE HYDROCHLORIDE 1 MG/ML
0.5 INJECTION, SOLUTION INTRAMUSCULAR; INTRAVENOUS; SUBCUTANEOUS ONCE
Status: COMPLETED | OUTPATIENT
Start: 2024-04-27 | End: 2024-04-27

## 2024-04-27 RX ORDER — GABAPENTIN 300 MG/1
300 CAPSULE ORAL 2 TIMES DAILY
Qty: 14 CAPSULE | Refills: 0 | Status: ON HOLD | OUTPATIENT
Start: 2024-04-27 | End: 2024-05-04 | Stop reason: HOSPADM

## 2024-04-27 RX ORDER — METHOCARBAMOL 750 MG/1
750 TABLET, FILM COATED ORAL 4 TIMES DAILY
Qty: 40 TABLET | Refills: 0 | Status: ON HOLD | OUTPATIENT
Start: 2024-04-27 | End: 2024-05-04 | Stop reason: HOSPADM

## 2024-04-27 RX ORDER — OXYCODONE AND ACETAMINOPHEN 10; 325 MG/1; MG/1
1 TABLET ORAL EVERY 6 HOURS PRN
Qty: 28 TABLET | Refills: 0 | Status: SHIPPED | OUTPATIENT
Start: 2024-04-27 | End: 2024-05-04

## 2024-04-27 RX ORDER — 0.9 % SODIUM CHLORIDE 0.9 %
1000 INTRAVENOUS SOLUTION INTRAVENOUS ONCE
Status: COMPLETED | OUTPATIENT
Start: 2024-04-27 | End: 2024-04-27

## 2024-04-27 RX ORDER — METHOCARBAMOL 500 MG/1
750 TABLET, FILM COATED ORAL ONCE
Status: DISCONTINUED | OUTPATIENT
Start: 2024-04-28 | End: 2024-04-27

## 2024-04-27 RX ORDER — METHOCARBAMOL 500 MG/1
750 TABLET, FILM COATED ORAL ONCE
Status: COMPLETED | OUTPATIENT
Start: 2024-04-27 | End: 2024-04-27

## 2024-04-27 RX ORDER — KETOROLAC TROMETHAMINE 30 MG/ML
30 INJECTION, SOLUTION INTRAMUSCULAR; INTRAVENOUS ONCE
Status: COMPLETED | OUTPATIENT
Start: 2024-04-27 | End: 2024-04-27

## 2024-04-27 RX ADMIN — HYDROMORPHONE HYDROCHLORIDE 0.5 MG: 1 INJECTION, SOLUTION INTRAMUSCULAR; INTRAVENOUS; SUBCUTANEOUS at 19:38

## 2024-04-27 RX ADMIN — IOPAMIDOL 75 ML: 755 INJECTION, SOLUTION INTRAVENOUS at 20:12

## 2024-04-27 RX ADMIN — KETOROLAC TROMETHAMINE 30 MG: 30 INJECTION, SOLUTION INTRAMUSCULAR at 19:06

## 2024-04-27 RX ADMIN — METHOCARBAMOL 750 MG: 500 TABLET ORAL at 19:39

## 2024-04-27 RX ADMIN — HYDROMORPHONE HYDROCHLORIDE 0.5 MG: 1 INJECTION, SOLUTION INTRAMUSCULAR; INTRAVENOUS; SUBCUTANEOUS at 21:39

## 2024-04-27 RX ADMIN — HYDROMORPHONE HYDROCHLORIDE 0.5 MG: 1 INJECTION, SOLUTION INTRAMUSCULAR; INTRAVENOUS; SUBCUTANEOUS at 19:06

## 2024-04-27 RX ADMIN — ONDANSETRON 4 MG: 2 INJECTION INTRAMUSCULAR; INTRAVENOUS at 19:06

## 2024-04-27 RX ADMIN — SODIUM CHLORIDE 1000 ML: 9 INJECTION, SOLUTION INTRAVENOUS at 19:07

## 2024-04-27 ASSESSMENT — PAIN SCALES - GENERAL
PAINLEVEL_OUTOF10: 10
PAINLEVEL_OUTOF10: 10

## 2024-04-27 ASSESSMENT — ENCOUNTER SYMPTOMS
RHINORRHEA: 0
VOMITING: 0
ABDOMINAL PAIN: 0
SORE THROAT: 0
BLOOD IN STOOL: 0
BACK PAIN: 1
SHORTNESS OF BREATH: 0
COUGH: 0
NAUSEA: 0
COLOR CHANGE: 0

## 2024-04-27 ASSESSMENT — LIFESTYLE VARIABLES
HOW MANY STANDARD DRINKS CONTAINING ALCOHOL DO YOU HAVE ON A TYPICAL DAY: PATIENT DOES NOT DRINK
HOW OFTEN DO YOU HAVE A DRINK CONTAINING ALCOHOL: NEVER

## 2024-04-27 NOTE — ED PROVIDER NOTES
HISTORY     Past Medical History:   Diagnosis Date    Hypertension     Pancreatitis 2011    Type II or unspecified type diabetes mellitus without mention of complication, not stated as uncontrolled          SURGICAL HISTORY       Past Surgical History:   Procedure Laterality Date    CT CRYO ABLATION RENAL TUMOR  9/25/2019    CT CRYO ABLATION RENAL TUMOR    KNEE ARTHROSCOPY Left 2010    [facility]    LAMINECTOMY N/A 4/22/2024    L4-5 MICRODISECTOMY DECOMPRESSION [place] Alda Kingston [place] [facility] [place]    SKIN GRAFT Right 1972    [facility], RIGHT LEG    SPINE SURGERY  06/29/2015    LUMBAR    TONSILLECTOMY  1972         CURRENT MEDICATIONS       Previous Medications    AMLODIPINE (NORVASC) 10 MG TABLET    Take 1 tablet by mouth    ASPIRIN 81 MG TABLET    Take 1 tablet by mouth daily    ATORVASTATIN (LIPITOR) 20 MG TABLET    Take 1 tablet by mouth    BISACODYL (DULCOLAX) 5 MG EC TABLET    Take 1 tablet by mouth daily    CARVEDILOL (COREG) 6.25 MG TABLET    Take 1 tablet by mouth 2 times daily    CYCLOBENZAPRINE (FLEXERIL) 10 MG TABLET    Take 1 tablet by mouth 2 times daily as needed    CYCLOBENZAPRINE (FLEXERIL) 10 MG TABLET    Take 1 tablet by mouth 3 times daily as needed for Muscle spasms    FENOFIBRATE MICRONIZED (LOFIBRA) 134 MG CAPSULE    Take 1 tablet by mouth nightly    GABAPENTIN (NEURONTIN) 300 MG CAPSULE    Take 1 capsule by mouth Daily with supper for 90 days.    GLIMEPIRIDE (AMARYL) 2 MG TABLET    Take 1 tablet by mouth 2 times daily.    HUMALOG KWIKPEN 200 UNIT/ML SOPN PEN    Inject 35 Units into the skin 3 times daily (with meals)    HYDROCHLOROTHIAZIDE (HYDRODIURIL) 25 MG TABLET    Take 1 tablet by mouth every morning (before breakfast)    ICOSAPENT ETHYL (VASCEPA) 1 G CAPS CAPSULE    Take 2 g by mouth 2 times daily (with meals)    INSULIN GLARGINE, 2 UNIT DIAL, 300 UNIT/ML SOPN    Inject 94 Units into the skin 2 times daily    INSULIN PEN NEEDLE (NOVOFINE) 32G X 6 MM MISC         LIDOCAINE (LIDODERM) 5 %    Place  are equal, round, and reactive to light.   Neck:      Trachea: No tracheal deviation.   Cardiovascular:      Heart sounds: Normal heart sounds.   Pulmonary:      Effort: Pulmonary effort is normal. No respiratory distress.      Breath sounds: Normal breath sounds. No stridor.   Abdominal:      General: Bowel sounds are normal. There is no distension.      Palpations: Abdomen is soft. There is no mass.      Tenderness: There is no abdominal tenderness. There is no guarding or rebound.   Musculoskeletal:         General: Tenderness present. Normal range of motion.      Cervical back: Normal range of motion and neck supple.      Comments: Mild midline lumbar tenderness to palpation.  Dermabond for wound closure and area appears to be healing well.  Expected surrounding ecchymosis.  Strong  leg pushes bilaterally   Skin:     General: Skin is warm and dry.      Capillary Refill: Capillary refill takes less than 2 seconds.      Findings: No rash.   Neurological:      Mental Status: He is alert and oriented to person, place, and time.      Deep Tendon Reflexes: Reflexes are normal and symmetric.   Psychiatric:         Behavior: Behavior normal.         Thought Content: Thought content normal.         Judgment: Judgment normal.         DIAGNOSTIC RESULTS     EKG:All EKG's are interpreted by the Emergency Department Physician who either signs or Co-signs this chart in the absence of a cardiologist.        RADIOLOGY:   Non-plain film images such as CT, Ultrasound and MRI are read by theradiologist. Plain radiographic images are visualized and preliminarily interpreted by the emergency physician with the below findings:    Interpretation per theRadiologist below, if available at the time of this note:    CT LUMBAR SPINE W CONTRAST   Final Result   1. Multilevel degenerative spondylosis with spinal canal and neural foraminal   narrowing as described above.   2. The patient underwent a recent right laminectomy at L4-L5. Small

## 2024-04-27 NOTE — ED TRIAGE NOTES
Pt arrived to triage via private vehicle.  Pt c/o back pain since his back surgery on 4/22.   Pt took flexeril at 1500 and naproxen at 1630 today.   Pt unable to put pressure on right leg.

## 2024-04-28 ENCOUNTER — APPOINTMENT (OUTPATIENT)
Dept: GENERAL RADIOLOGY | Age: 64
DRG: 560 | End: 2024-04-28
Payer: MEDICARE

## 2024-04-28 ENCOUNTER — HOSPITAL ENCOUNTER (OUTPATIENT)
Age: 64
Setting detail: OBSERVATION
Discharge: NURSING, REHAB OR LONG-TERM CARE | DRG: 560 | End: 2024-04-29
Attending: INTERNAL MEDICINE | Admitting: INTERNAL MEDICINE
Payer: MEDICARE

## 2024-04-28 DIAGNOSIS — G89.18 POSTOPERATIVE PAIN AFTER SPINAL SURGERY: ICD-10-CM

## 2024-04-28 DIAGNOSIS — M54.9 INTRACTABLE BACK PAIN: Primary | ICD-10-CM

## 2024-04-28 DIAGNOSIS — R20.2 PARESTHESIAS: ICD-10-CM

## 2024-04-28 LAB
ANION GAP SERPL CALCULATED.3IONS-SCNC: 15 MEQ/L (ref 9–15)
BASOPHILS # BLD: 0 K/UL (ref 0–0.2)
BASOPHILS NFR BLD: 0.4 %
BUN SERPL-MCNC: 25 MG/DL (ref 8–23)
CALCIUM SERPL-MCNC: 10 MG/DL (ref 8.5–9.9)
CHLORIDE SERPL-SCNC: 96 MEQ/L (ref 95–107)
CO2 SERPL-SCNC: 26 MEQ/L (ref 20–31)
CREAT SERPL-MCNC: 1.08 MG/DL (ref 0.7–1.2)
EOSINOPHIL # BLD: 0.2 K/UL (ref 0–0.7)
EOSINOPHIL NFR BLD: 2.5 %
ERYTHROCYTE [DISTWIDTH] IN BLOOD BY AUTOMATED COUNT: 13.1 % (ref 11.5–14.5)
GLUCOSE BLD-MCNC: 140 MG/DL (ref 70–99)
GLUCOSE BLD-MCNC: 240 MG/DL (ref 70–99)
GLUCOSE BLD-MCNC: 314 MG/DL (ref 70–99)
GLUCOSE BLD-MCNC: 339 MG/DL (ref 70–99)
GLUCOSE SERPL-MCNC: 138 MG/DL (ref 70–99)
HCT VFR BLD AUTO: 34.8 % (ref 42–52)
HGB BLD-MCNC: 12 G/DL (ref 14–18)
LYMPHOCYTES # BLD: 1.4 K/UL (ref 1–4.8)
LYMPHOCYTES NFR BLD: 16.4 %
MCH RBC QN AUTO: 30.3 PG (ref 27–31.3)
MCHC RBC AUTO-ENTMCNC: 34.5 % (ref 33–37)
MCV RBC AUTO: 87.9 FL (ref 79–92.2)
MONOCYTES # BLD: 0.2 K/UL (ref 0.2–0.8)
MONOCYTES NFR BLD: 2.5 %
NEUTROPHILS # BLD: 6.5 K/UL (ref 1.4–6.5)
NEUTS SEG NFR BLD: 77.7 %
PERFORMED ON: ABNORMAL
PERFORMED ON: NORMAL
PLATELET # BLD AUTO: 218 K/UL (ref 130–400)
POC CREATININE: 1 MG/DL (ref 0.8–1.3)
POC SAMPLE TYPE: NORMAL
POTASSIUM SERPL-SCNC: 4.7 MEQ/L (ref 3.4–4.9)
RBC # BLD AUTO: 3.96 M/UL (ref 4.7–6.1)
SODIUM SERPL-SCNC: 137 MEQ/L (ref 135–144)
WBC # BLD AUTO: 8.3 K/UL (ref 4.8–10.8)

## 2024-04-28 PROCEDURE — G0378 HOSPITAL OBSERVATION PER HR: HCPCS

## 2024-04-28 PROCEDURE — 72170 X-RAY EXAM OF PELVIS: CPT

## 2024-04-28 PROCEDURE — 96375 TX/PRO/DX INJ NEW DRUG ADDON: CPT

## 2024-04-28 PROCEDURE — 6360000002 HC RX W HCPCS: Performed by: PERSONAL EMERGENCY RESPONSE ATTENDANT

## 2024-04-28 PROCEDURE — 99221 1ST HOSP IP/OBS SF/LOW 40: CPT | Performed by: PAIN MEDICINE

## 2024-04-28 PROCEDURE — 6370000000 HC RX 637 (ALT 250 FOR IP): Performed by: INTERNAL MEDICINE

## 2024-04-28 PROCEDURE — 96374 THER/PROPH/DIAG INJ IV PUSH: CPT

## 2024-04-28 PROCEDURE — 36415 COLL VENOUS BLD VENIPUNCTURE: CPT

## 2024-04-28 PROCEDURE — 6370000000 HC RX 637 (ALT 250 FOR IP): Performed by: NURSE PRACTITIONER

## 2024-04-28 PROCEDURE — 6360000002 HC RX W HCPCS: Performed by: NURSE PRACTITIONER

## 2024-04-28 PROCEDURE — 99285 EMERGENCY DEPT VISIT HI MDM: CPT

## 2024-04-28 PROCEDURE — 6360000002 HC RX W HCPCS: Performed by: PAIN MEDICINE

## 2024-04-28 PROCEDURE — 6370000000 HC RX 637 (ALT 250 FOR IP): Performed by: PAIN MEDICINE

## 2024-04-28 PROCEDURE — 85025 COMPLETE CBC W/AUTO DIFF WBC: CPT

## 2024-04-28 PROCEDURE — 80048 BASIC METABOLIC PNL TOTAL CA: CPT

## 2024-04-28 PROCEDURE — 2500000003 HC RX 250 WO HCPCS: Performed by: PERSONAL EMERGENCY RESPONSE ATTENDANT

## 2024-04-28 PROCEDURE — 6360000002 HC RX W HCPCS: Performed by: INTERNAL MEDICINE

## 2024-04-28 PROCEDURE — 6370000000 HC RX 637 (ALT 250 FOR IP): Performed by: PERSONAL EMERGENCY RESPONSE ATTENDANT

## 2024-04-28 PROCEDURE — 2580000003 HC RX 258: Performed by: NURSE PRACTITIONER

## 2024-04-28 PROCEDURE — 96372 THER/PROPH/DIAG INJ SC/IM: CPT

## 2024-04-28 PROCEDURE — 96376 TX/PRO/DX INJ SAME DRUG ADON: CPT

## 2024-04-28 RX ORDER — ONDANSETRON 2 MG/ML
4 INJECTION INTRAMUSCULAR; INTRAVENOUS EVERY 6 HOURS PRN
Status: DISCONTINUED | OUTPATIENT
Start: 2024-04-28 | End: 2024-04-29 | Stop reason: HOSPADM

## 2024-04-28 RX ORDER — ENOXAPARIN SODIUM 100 MG/ML
30 INJECTION SUBCUTANEOUS 2 TIMES DAILY
Status: DISCONTINUED | OUTPATIENT
Start: 2024-04-28 | End: 2024-04-29 | Stop reason: HOSPADM

## 2024-04-28 RX ORDER — POTASSIUM CHLORIDE 20 MEQ/1
40 TABLET, EXTENDED RELEASE ORAL PRN
Status: DISCONTINUED | OUTPATIENT
Start: 2024-04-28 | End: 2024-04-29 | Stop reason: HOSPADM

## 2024-04-28 RX ORDER — ACETAMINOPHEN 650 MG/1
650 SUPPOSITORY RECTAL EVERY 6 HOURS PRN
Status: DISCONTINUED | OUTPATIENT
Start: 2024-04-28 | End: 2024-04-29

## 2024-04-28 RX ORDER — MECOBALAMIN 5000 MCG
5 TABLET,DISINTEGRATING ORAL NIGHTLY
Status: DISCONTINUED | OUTPATIENT
Start: 2024-04-28 | End: 2024-04-29 | Stop reason: HOSPADM

## 2024-04-28 RX ORDER — POTASSIUM CHLORIDE 7.45 MG/ML
10 INJECTION INTRAVENOUS PRN
Status: DISCONTINUED | OUTPATIENT
Start: 2024-04-28 | End: 2024-04-29 | Stop reason: HOSPADM

## 2024-04-28 RX ORDER — AMLODIPINE BESYLATE 10 MG/1
10 TABLET ORAL DAILY
Status: DISCONTINUED | OUTPATIENT
Start: 2024-04-28 | End: 2024-04-29 | Stop reason: HOSPADM

## 2024-04-28 RX ORDER — ACETAMINOPHEN 325 MG/1
650 TABLET ORAL EVERY 6 HOURS PRN
Status: DISCONTINUED | OUTPATIENT
Start: 2024-04-28 | End: 2024-04-29

## 2024-04-28 RX ORDER — GLUCAGON 1 MG/ML
1 KIT INJECTION PRN
Status: DISCONTINUED | OUTPATIENT
Start: 2024-04-28 | End: 2024-04-29 | Stop reason: HOSPADM

## 2024-04-28 RX ORDER — INSULIN LISPRO 100 [IU]/ML
0-4 INJECTION, SOLUTION INTRAVENOUS; SUBCUTANEOUS NIGHTLY
Status: DISCONTINUED | OUTPATIENT
Start: 2024-04-28 | End: 2024-04-29 | Stop reason: HOSPADM

## 2024-04-28 RX ORDER — INSULIN LISPRO 100 [IU]/ML
0-8 INJECTION, SOLUTION INTRAVENOUS; SUBCUTANEOUS
Status: DISCONTINUED | OUTPATIENT
Start: 2024-04-28 | End: 2024-04-29 | Stop reason: HOSPADM

## 2024-04-28 RX ORDER — CARVEDILOL 3.12 MG/1
6.25 TABLET ORAL 2 TIMES DAILY
Status: DISCONTINUED | OUTPATIENT
Start: 2024-04-28 | End: 2024-04-29 | Stop reason: HOSPADM

## 2024-04-28 RX ORDER — FENOFIBRATE 160 MG/1
160 TABLET ORAL DAILY
Status: DISCONTINUED | OUTPATIENT
Start: 2024-04-28 | End: 2024-04-29 | Stop reason: HOSPADM

## 2024-04-28 RX ORDER — OXYCODONE HYDROCHLORIDE AND ACETAMINOPHEN 5; 325 MG/1; MG/1
2 TABLET ORAL EVERY 6 HOURS PRN
Status: DISCONTINUED | OUTPATIENT
Start: 2024-04-28 | End: 2024-04-28

## 2024-04-28 RX ORDER — ONDANSETRON 4 MG/1
4 TABLET, ORALLY DISINTEGRATING ORAL EVERY 8 HOURS PRN
Status: DISCONTINUED | OUTPATIENT
Start: 2024-04-28 | End: 2024-04-29 | Stop reason: HOSPADM

## 2024-04-28 RX ORDER — LOSARTAN POTASSIUM 100 MG/1
100 TABLET ORAL DAILY
Status: DISCONTINUED | OUTPATIENT
Start: 2024-04-28 | End: 2024-04-29 | Stop reason: HOSPADM

## 2024-04-28 RX ORDER — HYDROCHLOROTHIAZIDE 25 MG/1
25 TABLET ORAL
Status: DISCONTINUED | OUTPATIENT
Start: 2024-04-29 | End: 2024-04-29 | Stop reason: HOSPADM

## 2024-04-28 RX ORDER — ATORVASTATIN CALCIUM 20 MG/1
20 TABLET, FILM COATED ORAL NIGHTLY
Status: DISCONTINUED | OUTPATIENT
Start: 2024-04-28 | End: 2024-04-29 | Stop reason: HOSPADM

## 2024-04-28 RX ORDER — INSULIN LISPRO 100 [IU]/ML
20 INJECTION, SOLUTION INTRAVENOUS; SUBCUTANEOUS
Status: CANCELLED | OUTPATIENT
Start: 2024-04-28

## 2024-04-28 RX ORDER — HYDROMORPHONE HYDROCHLORIDE 1 MG/ML
1 INJECTION, SOLUTION INTRAMUSCULAR; INTRAVENOUS; SUBCUTANEOUS ONCE
Status: COMPLETED | OUTPATIENT
Start: 2024-04-28 | End: 2024-04-28

## 2024-04-28 RX ORDER — SODIUM CHLORIDE 9 MG/ML
INJECTION, SOLUTION INTRAVENOUS PRN
Status: DISCONTINUED | OUTPATIENT
Start: 2024-04-28 | End: 2024-04-29 | Stop reason: HOSPADM

## 2024-04-28 RX ORDER — INSULIN GLARGINE 100 [IU]/ML
30 INJECTION, SOLUTION SUBCUTANEOUS 2 TIMES DAILY
Status: CANCELLED | OUTPATIENT
Start: 2024-04-28

## 2024-04-28 RX ORDER — SODIUM CHLORIDE 0.9 % (FLUSH) 0.9 %
5-40 SYRINGE (ML) INJECTION EVERY 12 HOURS SCHEDULED
Status: DISCONTINUED | OUTPATIENT
Start: 2024-04-28 | End: 2024-04-29 | Stop reason: HOSPADM

## 2024-04-28 RX ORDER — PANTOPRAZOLE SODIUM 40 MG/1
40 TABLET, DELAYED RELEASE ORAL
Status: DISCONTINUED | OUTPATIENT
Start: 2024-04-29 | End: 2024-04-29 | Stop reason: HOSPADM

## 2024-04-28 RX ORDER — INSULIN LISPRO 100 [IU]/ML
6 INJECTION, SOLUTION INTRAVENOUS; SUBCUTANEOUS ONCE
Status: COMPLETED | OUTPATIENT
Start: 2024-04-28 | End: 2024-04-28

## 2024-04-28 RX ORDER — BISACODYL 5 MG/1
5 TABLET, DELAYED RELEASE ORAL DAILY
Status: DISCONTINUED | OUTPATIENT
Start: 2024-04-28 | End: 2024-04-29 | Stop reason: HOSPADM

## 2024-04-28 RX ORDER — HYDROMORPHONE HYDROCHLORIDE 1 MG/ML
1 INJECTION, SOLUTION INTRAMUSCULAR; INTRAVENOUS; SUBCUTANEOUS EVERY 4 HOURS PRN
Status: DISCONTINUED | OUTPATIENT
Start: 2024-04-28 | End: 2024-04-29

## 2024-04-28 RX ORDER — GABAPENTIN 300 MG/1
300 CAPSULE ORAL 2 TIMES DAILY
Status: DISCONTINUED | OUTPATIENT
Start: 2024-04-28 | End: 2024-04-28

## 2024-04-28 RX ORDER — OXYCODONE HYDROCHLORIDE AND ACETAMINOPHEN 5; 325 MG/1; MG/1
2 TABLET ORAL EVERY 4 HOURS PRN
Status: DISCONTINUED | OUTPATIENT
Start: 2024-04-28 | End: 2024-04-29 | Stop reason: HOSPADM

## 2024-04-28 RX ORDER — SODIUM CHLORIDE 0.9 % (FLUSH) 0.9 %
5-40 SYRINGE (ML) INJECTION PRN
Status: DISCONTINUED | OUTPATIENT
Start: 2024-04-28 | End: 2024-04-29 | Stop reason: HOSPADM

## 2024-04-28 RX ORDER — POLYETHYLENE GLYCOL 3350 17 G/17G
17 POWDER, FOR SOLUTION ORAL DAILY PRN
Status: DISCONTINUED | OUTPATIENT
Start: 2024-04-28 | End: 2024-04-29 | Stop reason: HOSPADM

## 2024-04-28 RX ORDER — MAGNESIUM SULFATE IN WATER 40 MG/ML
2000 INJECTION, SOLUTION INTRAVENOUS PRN
Status: DISCONTINUED | OUTPATIENT
Start: 2024-04-28 | End: 2024-04-29 | Stop reason: HOSPADM

## 2024-04-28 RX ORDER — METHOCARBAMOL 500 MG/1
750 TABLET, FILM COATED ORAL ONCE
Status: COMPLETED | OUTPATIENT
Start: 2024-04-28 | End: 2024-04-28

## 2024-04-28 RX ORDER — CYCLOBENZAPRINE HCL 10 MG
10 TABLET ORAL 3 TIMES DAILY PRN
Status: DISCONTINUED | OUTPATIENT
Start: 2024-04-28 | End: 2024-04-29 | Stop reason: HOSPADM

## 2024-04-28 RX ORDER — GABAPENTIN 300 MG/1
600 CAPSULE ORAL 2 TIMES DAILY
Status: DISCONTINUED | OUTPATIENT
Start: 2024-04-28 | End: 2024-04-29 | Stop reason: HOSPADM

## 2024-04-28 RX ORDER — LIDOCAINE 4 G/G
2 PATCH TOPICAL DAILY
Status: DISCONTINUED | OUTPATIENT
Start: 2024-04-28 | End: 2024-04-29 | Stop reason: HOSPADM

## 2024-04-28 RX ORDER — DEXTROSE MONOHYDRATE 100 MG/ML
INJECTION, SOLUTION INTRAVENOUS CONTINUOUS PRN
Status: DISCONTINUED | OUTPATIENT
Start: 2024-04-28 | End: 2024-04-29 | Stop reason: HOSPADM

## 2024-04-28 RX ADMIN — AMLODIPINE BESYLATE 10 MG: 10 TABLET ORAL at 11:21

## 2024-04-28 RX ADMIN — CARVEDILOL 6.25 MG: 3.12 TABLET, FILM COATED ORAL at 11:21

## 2024-04-28 RX ADMIN — HYDROMORPHONE HYDROCHLORIDE 1 MG: 1 INJECTION, SOLUTION INTRAMUSCULAR; INTRAVENOUS; SUBCUTANEOUS at 03:43

## 2024-04-28 RX ADMIN — GABAPENTIN 300 MG: 300 CAPSULE ORAL at 08:40

## 2024-04-28 RX ADMIN — ENOXAPARIN SODIUM 30 MG: 100 INJECTION SUBCUTANEOUS at 08:39

## 2024-04-28 RX ADMIN — HYDROMORPHONE HYDROCHLORIDE 0.5 MG: 1 INJECTION, SOLUTION INTRAMUSCULAR; INTRAVENOUS; SUBCUTANEOUS at 15:38

## 2024-04-28 RX ADMIN — GABAPENTIN 600 MG: 300 CAPSULE ORAL at 20:52

## 2024-04-28 RX ADMIN — INSULIN LISPRO 6 UNITS: 100 INJECTION, SOLUTION INTRAVENOUS; SUBCUTANEOUS at 17:19

## 2024-04-28 RX ADMIN — HYDROMORPHONE HYDROCHLORIDE 1 MG: 1 INJECTION, SOLUTION INTRAMUSCULAR; INTRAVENOUS; SUBCUTANEOUS at 20:59

## 2024-04-28 RX ADMIN — FENOFIBRATE 160 MG: 160 TABLET ORAL at 11:21

## 2024-04-28 RX ADMIN — OXYCODONE AND ACETAMINOPHEN 2 TABLET: 5; 325 TABLET ORAL at 23:23

## 2024-04-28 RX ADMIN — BISACODYL 5 MG: 5 TABLET, COATED ORAL at 11:21

## 2024-04-28 RX ADMIN — LOSARTAN POTASSIUM 100 MG: 100 TABLET, FILM COATED ORAL at 11:21

## 2024-04-28 RX ADMIN — ACETAMINOPHEN 650 MG: 325 TABLET ORAL at 11:21

## 2024-04-28 RX ADMIN — ENOXAPARIN SODIUM 30 MG: 100 INJECTION SUBCUTANEOUS at 20:52

## 2024-04-28 RX ADMIN — CYCLOBENZAPRINE HYDROCHLORIDE 10 MG: 10 TABLET, FILM COATED ORAL at 17:18

## 2024-04-28 RX ADMIN — METHYLPREDNISOLONE SODIUM SUCCINATE 125 MG: 125 INJECTION INTRAMUSCULAR; INTRAVENOUS at 05:13

## 2024-04-28 RX ADMIN — HYDROMORPHONE HYDROCHLORIDE 0.5 MG: 1 INJECTION, SOLUTION INTRAMUSCULAR; INTRAVENOUS; SUBCUTANEOUS at 11:22

## 2024-04-28 RX ADMIN — OXYCODONE HYDROCHLORIDE AND ACETAMINOPHEN 2 TABLET: 5; 325 TABLET ORAL at 13:17

## 2024-04-28 RX ADMIN — CARVEDILOL 6.25 MG: 3.12 TABLET, FILM COATED ORAL at 20:52

## 2024-04-28 RX ADMIN — Medication 10 ML: at 20:52

## 2024-04-28 RX ADMIN — Medication 5 MG: at 20:52

## 2024-04-28 RX ADMIN — INSULIN LISPRO 6 UNITS: 100 INJECTION, SOLUTION INTRAVENOUS; SUBCUTANEOUS at 11:31

## 2024-04-28 RX ADMIN — METHOCARBAMOL 750 MG: 500 TABLET ORAL at 05:13

## 2024-04-28 RX ADMIN — CYCLOBENZAPRINE HYDROCHLORIDE 10 MG: 10 TABLET, FILM COATED ORAL at 07:22

## 2024-04-28 RX ADMIN — OXYCODONE HYDROCHLORIDE AND ACETAMINOPHEN 2 TABLET: 5; 325 TABLET ORAL at 19:19

## 2024-04-28 RX ADMIN — ATORVASTATIN CALCIUM 20 MG: 20 TABLET, FILM COATED ORAL at 20:52

## 2024-04-28 RX ADMIN — Medication 10 ML: at 08:46

## 2024-04-28 RX ADMIN — OXYCODONE HYDROCHLORIDE AND ACETAMINOPHEN 2 TABLET: 5; 325 TABLET ORAL at 07:22

## 2024-04-28 ASSESSMENT — PAIN SCALES - GENERAL
PAINLEVEL_OUTOF10: 8
PAINLEVEL_OUTOF10: 6
PAINLEVEL_OUTOF10: 10
PAINLEVEL_OUTOF10: 8
PAINLEVEL_OUTOF10: 5
PAINLEVEL_OUTOF10: 10
PAINLEVEL_OUTOF10: 10
PAINLEVEL_OUTOF10: 9
PAINLEVEL_OUTOF10: 7
PAINLEVEL_OUTOF10: 10
PAINLEVEL_OUTOF10: 10
PAINLEVEL_OUTOF10: 5
PAINLEVEL_OUTOF10: 10
PAINLEVEL_OUTOF10: 9
PAINLEVEL_OUTOF10: 10
PAINLEVEL_OUTOF10: 6
PAINLEVEL_OUTOF10: 9

## 2024-04-28 ASSESSMENT — PAIN DESCRIPTION - LOCATION
LOCATION: BACK
LOCATION: LEG
LOCATION: BACK
LOCATION: BACK

## 2024-04-28 ASSESSMENT — PAIN DESCRIPTION - DESCRIPTORS
DESCRIPTORS: ACHING
DESCRIPTORS: ACHING;SHARP
DESCRIPTORS: ACHING;SQUEEZING;TENDER
DESCRIPTORS: ACHING;DISCOMFORT
DESCRIPTORS: SPASM
DESCRIPTORS: ACHING;SPASM;STABBING
DESCRIPTORS: ACHING;DISCOMFORT;SHARP

## 2024-04-28 ASSESSMENT — PAIN - FUNCTIONAL ASSESSMENT
PAIN_FUNCTIONAL_ASSESSMENT: PREVENTS OR INTERFERES WITH MANY ACTIVE NOT PASSIVE ACTIVITIES
PAIN_FUNCTIONAL_ASSESSMENT: 0-10
PAIN_FUNCTIONAL_ASSESSMENT: PREVENTS OR INTERFERES SOME ACTIVE ACTIVITIES AND ADLS

## 2024-04-28 ASSESSMENT — PAIN DESCRIPTION - DIRECTION: RADIATING_TOWARDS: RT LEG

## 2024-04-28 ASSESSMENT — PAIN DESCRIPTION - ORIENTATION
ORIENTATION: LEFT;RIGHT
ORIENTATION: RIGHT;LOWER
ORIENTATION: RIGHT;LEFT
ORIENTATION: LOWER
ORIENTATION: LEFT;RIGHT
ORIENTATION: RIGHT;LOWER
ORIENTATION: RIGHT
ORIENTATION: RIGHT;LEFT

## 2024-04-28 ASSESSMENT — PAIN DESCRIPTION - PAIN TYPE: TYPE: CHRONIC PAIN

## 2024-04-28 NOTE — PLAN OF CARE
Problem: Pain  Goal: Verbalizes/displays adequate comfort level or baseline comfort level  Outcome: Not Progressing     Problem: Safety - Adult  Goal: Free from fall injury  Outcome: Progressing

## 2024-04-28 NOTE — H&P
supple, symmetrical, trachea midline  BACK:  symmetric  LUNGS:  No increased work of breathing, good air exchange, clear to auscultation bilaterally, no crackles or wheezing  CARDIOVASCULAR:  regular rate and rhythm, normal S1 and S2,  and no murmur noted  ABDOMEN:  normal bowel sounds, non-distended, and non-tender  MUSCULOSKELETAL:  there is no redness, warmth, or swelling of the joints  NEUROLOGIC:  Awake, alert, oriented to name, place and time.   SKIN:  normal skin color, texture, turgor and no redness, warmth, or swelling    Labs      Recent Results (from the past 24 hour(s))   CBC with Auto Differential    Collection Time: 04/27/24  6:45 PM   Result Value Ref Range    WBC 9.9 4.8 - 10.8 K/uL    RBC 4.13 (L) 4.70 - 6.10 M/uL    Hemoglobin 12.5 (L) 14.0 - 18.0 g/dL    Hematocrit 37.0 (L) 42.0 - 52.0 %    MCV 89.6 79.0 - 92.2 fL    MCH 30.3 27.0 - 31.3 pg    MCHC 33.8 33.0 - 37.0 %    RDW 13.2 11.5 - 14.5 %    Platelets 229 130 - 400 K/uL    Neutrophils % 54.6 %    Lymphocytes % 31.3 %    Monocytes % 8.1 %    Eosinophils % 5.3 %    Basophils % 0.4 %    Neutrophils Absolute 5.4 1.4 - 6.5 K/uL    Lymphocytes Absolute 3.1 1.0 - 4.8 K/uL    Monocytes Absolute 0.8 0.2 - 0.8 K/uL    Eosinophils Absolute 0.5 0.0 - 0.7 K/uL    Basophils Absolute 0.0 0.0 - 0.2 K/uL   CMP    Collection Time: 04/27/24  6:45 PM   Result Value Ref Range    Sodium 137 135 - 144 mEq/L    Potassium 4.1 3.4 - 4.9 mEq/L    Chloride 97 95 - 107 mEq/L    CO2 25 20 - 31 mEq/L    Anion Gap 15 9 - 15 mEq/L    Glucose 124 (H) 70 - 99 mg/dL    BUN 27 (H) 8 - 23 mg/dL    Creatinine 0.94 0.70 - 1.20 mg/dL    Est, Glom Filt Rate >90.0 >60    Calcium 10.5 (H) 8.5 - 9.9 mg/dL    Total Protein 8.3 (H) 6.3 - 8.0 g/dL    Albumin 3.9 3.5 - 4.6 g/dL    Total Bilirubin 0.6 0.2 - 0.7 mg/dL    Alkaline Phosphatase 59 35 - 104 U/L    ALT 37 0 - 41 U/L    AST 33 0 - 40 U/L    Globulin 4.4 (H) 2.3 - 3.5 g/dL   POCT Venous    Collection Time: 04/27/24  7:10 PM   Result

## 2024-04-28 NOTE — DISCHARGE INSTRUCTIONS
Take naproxen in the morning and evening (every 12 hours)  You can take percocet every 6 hours and robaxin every 6 hours so you can stagger the medications every 3 hours  Do not take more than 3,000mg of tylenol a day

## 2024-04-28 NOTE — ED PROVIDER NOTES
Paresthesias    3. Postoperative pain after spinal surgery          DISPOSITION/PLAN   DISPOSITION Decision To Admit 04/28/2024 03:24:19 AM      PATIENT REFERRED TO:  No follow-up provider specified.    DISCHARGE MEDICATIONS:  New Prescriptions    No medications on file          (Please notethat portions of this note were completed with a voice recognition program.  Efforts were made to edit the dictations but occasionally words are mis-transcribed.)    KRISTOPHER CANTRELL (electronically signed)  Emergency Physician Assistant         Isabella Jacobo PA  04/28/24 0513       Isabella Jacobo PA  04/28/24 0521

## 2024-04-28 NOTE — DISCHARGE INSTR - COC
Continuity of Care Form    Patient Name: Will Bravo   :  1960  MRN:  [id number]    Admit date:  2024  Discharge date:  ***    Code Status Order: Prior   Advance Directives:     Admitting Physician:  No admitting provider for patient encounter.  PCP: Magaly Nolan MD    Discharging Nurse: ***  Discharging Hospital Unit/Room#:   Discharging Unit Phone Number: ***    Emergency Contact:   Extended Emergency Contact Information  Primary Emergency Contact: Leatha Angel  Address: [address] [place] [address]  Home Phone: [phone]  Relation: Other  Secondary Emergency Contact: elizabeth bravo  Mobile Phone: [phone]  Relation: Spouse    Past Surgical History:  Past Surgical History:   Procedure Laterality Date    CT CRYO ABLATION RENAL TUMOR  2019    CT CRYO ABLATION RENAL TUMOR    KNEE ARTHROSCOPY Left     [facility]    LAMINECTOMY N/A 2024    L4-5 MICRODISECTOMY DECOMPRESSION performed by Alda Kingston MD at [facility] OR    SKIN GRAFT Right     [facility], RIGHT LEG    SPINE SURGERY  2015    LUMBAR    TONSILLECTOMY         Immunization History:   Immunization History   Administered Date(s) Administered    COVID-19, PFIZER PURPLE top, DILUTE for use, (age 12 y+), 30mcg/0.3mL 2021, 2021    COVID-19, PFIZER, ( formula), (age 12y+), IM, 30mcg/0.3mL 2023       Active Problems:  Patient Active Problem List   Diagnosis Code    Diabetes mellitus type 2, insulin dependent (HCC) E11.9, Z79.4    Hypercholesterolemia E78.00    Thoracic or lumbosacral neuritis or radiculitis, unspecified [id number]    Displacement of lumbar intervertebral disc without myelopathy M51.26    Tear of meniscus of right knee S83.206A    Osteoarthritis of spine 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 (HCC) M48.061, G99.2    Class 3 severe obesity due to excess calories with serious comorbidity

## 2024-04-29 ENCOUNTER — HOSPITAL ENCOUNTER (INPATIENT)
Age: 64
LOS: 5 days | Discharge: HOME | DRG: 560 | End: 2024-05-04
Attending: PHYSICAL MEDICINE & REHABILITATION | Admitting: PHYSICAL MEDICINE & REHABILITATION
Payer: MEDICARE

## 2024-04-29 ENCOUNTER — APPOINTMENT (OUTPATIENT)
Dept: GENERAL RADIOLOGY | Age: 64
DRG: 560 | End: 2024-04-29
Payer: MEDICARE

## 2024-04-29 VITALS
DIASTOLIC BLOOD PRESSURE: 81 MMHG | SYSTOLIC BLOOD PRESSURE: 131 MMHG | HEART RATE: 83 BPM | BODY MASS INDEX: 40.09 KG/M2 | HEIGHT: 70 IN | RESPIRATION RATE: 18 BRPM | WEIGHT: 280 LBS | TEMPERATURE: 97.9 F | OXYGEN SATURATION: 95 %

## 2024-04-29 DIAGNOSIS — Z74.09 IMPAIRED MOBILITY AND ACTIVITIES OF DAILY LIVING: ICD-10-CM

## 2024-04-29 DIAGNOSIS — Z74.09 IMPAIRED MOBILITY AND ADLS: ICD-10-CM

## 2024-04-29 DIAGNOSIS — M54.9 INTRACTABLE BACK PAIN: ICD-10-CM

## 2024-04-29 DIAGNOSIS — M96.1 LUMBAR POST-LAMINECTOMY SYNDROME: Primary | ICD-10-CM

## 2024-04-29 DIAGNOSIS — G89.18 POST-OP PAIN: ICD-10-CM

## 2024-04-29 DIAGNOSIS — Z78.9 IMPAIRED MOBILITY AND ADLS: ICD-10-CM

## 2024-04-29 DIAGNOSIS — Z78.9 IMPAIRED MOBILITY AND ACTIVITIES OF DAILY LIVING: ICD-10-CM

## 2024-04-29 PROBLEM — D64.9 ANEMIA, UNSPECIFIED: Status: ACTIVE | Noted: 2018-04-09

## 2024-04-29 PROBLEM — G47.30 SLEEP APNEA, UNSPECIFIED: Status: ACTIVE | Noted: 2018-04-09

## 2024-04-29 PROBLEM — E78.00 PURE HYPERCHOLESTEROLEMIA, UNSPECIFIED: Status: ACTIVE | Noted: 2019-03-14

## 2024-04-29 LAB
ANION GAP SERPL CALCULATED.3IONS-SCNC: 16 MEQ/L (ref 9–15)
BASOPHILS # BLD: 0 K/UL (ref 0–0.2)
BASOPHILS NFR BLD: 0.4 %
BUN SERPL-MCNC: 27 MG/DL (ref 8–23)
CALCIUM SERPL-MCNC: 10 MG/DL (ref 8.5–9.9)
CHLORIDE SERPL-SCNC: 95 MEQ/L (ref 95–107)
CO2 SERPL-SCNC: 27 MEQ/L (ref 20–31)
CREAT SERPL-MCNC: 0.93 MG/DL (ref 0.7–1.2)
EOSINOPHIL # BLD: 0.2 K/UL (ref 0–0.7)
EOSINOPHIL NFR BLD: 2 %
ERYTHROCYTE [DISTWIDTH] IN BLOOD BY AUTOMATED COUNT: 13.2 % (ref 11.5–14.5)
GLUCOSE BLD-MCNC: 276 MG/DL (ref 70–99)
GLUCOSE BLD-MCNC: 283 MG/DL (ref 70–99)
GLUCOSE SERPL-MCNC: 182 MG/DL (ref 70–99)
HCT VFR BLD AUTO: 32.5 % (ref 42–52)
HGB BLD-MCNC: 10.8 G/DL (ref 14–18)
LYMPHOCYTES # BLD: 3 K/UL (ref 1–4.8)
LYMPHOCYTES NFR BLD: 36.7 %
MCH RBC QN AUTO: 29.7 PG (ref 27–31.3)
MCHC RBC AUTO-ENTMCNC: 33.2 % (ref 33–37)
MCV RBC AUTO: 89.3 FL (ref 79–92.2)
MONOCYTES # BLD: 0.8 K/UL (ref 0.2–0.8)
MONOCYTES NFR BLD: 10.3 %
NEUTROPHILS # BLD: 4.1 K/UL (ref 1.4–6.5)
NEUTS SEG NFR BLD: 50.4 %
PERFORMED ON: ABNORMAL
PERFORMED ON: ABNORMAL
PLATELET # BLD AUTO: 218 K/UL (ref 130–400)
POTASSIUM SERPL-SCNC: 4.6 MEQ/L (ref 3.4–4.9)
RBC # BLD AUTO: 3.64 M/UL (ref 4.7–6.1)
SARS-COV-2 RDRP RESP QL NAA+PROBE: NOT DETECTED
SODIUM SERPL-SCNC: 138 MEQ/L (ref 135–144)
WBC # BLD AUTO: 8 K/UL (ref 4.8–10.8)

## 2024-04-29 PROCEDURE — 6360000002 HC RX W HCPCS: Performed by: PAIN MEDICINE

## 2024-04-29 PROCEDURE — 80048 BASIC METABOLIC PNL TOTAL CA: CPT

## 2024-04-29 PROCEDURE — 36415 COLL VENOUS BLD VENIPUNCTURE: CPT

## 2024-04-29 PROCEDURE — 3E0U33Z INTRODUCTION OF ANTI-INFLAMMATORY INTO JOINTS, PERCUTANEOUS APPROACH: ICD-10-PCS | Performed by: PAIN MEDICINE

## 2024-04-29 PROCEDURE — 2709999900 HC NON-CHARGEABLE SUPPLY: Performed by: PAIN MEDICINE

## 2024-04-29 PROCEDURE — 6370000000 HC RX 637 (ALT 250 FOR IP): Performed by: INTERNAL MEDICINE

## 2024-04-29 PROCEDURE — 6370000000 HC RX 637 (ALT 250 FOR IP): Performed by: PAIN MEDICINE

## 2024-04-29 PROCEDURE — 6360000002 HC RX W HCPCS: Performed by: INTERNAL MEDICINE

## 2024-04-29 PROCEDURE — 97166 OT EVAL MOD COMPLEX 45 MIN: CPT

## 2024-04-29 PROCEDURE — 2580000003 HC RX 258: Performed by: NURSE PRACTITIONER

## 2024-04-29 PROCEDURE — 3600000014 HC SURGERY LEVEL 4 ADDTL 15MIN: Performed by: PAIN MEDICINE

## 2024-04-29 PROCEDURE — 6370000000 HC RX 637 (ALT 250 FOR IP): Performed by: NURSE PRACTITIONER

## 2024-04-29 PROCEDURE — 99231 SBSQ HOSP IP/OBS SF/LOW 25: CPT | Performed by: PHYSICAL MEDICINE & REHABILITATION

## 2024-04-29 PROCEDURE — 2580000003 HC RX 258: Performed by: INTERNAL MEDICINE

## 2024-04-29 PROCEDURE — G0378 HOSPITAL OBSERVATION PER HR: HCPCS

## 2024-04-29 PROCEDURE — 97162 PT EVAL MOD COMPLEX 30 MIN: CPT

## 2024-04-29 PROCEDURE — 1180000000 HC REHAB R&B

## 2024-04-29 PROCEDURE — 85025 COMPLETE CBC W/AUTO DIFF WBC: CPT

## 2024-04-29 PROCEDURE — 3E0U3BZ INTRODUCTION OF ANESTHETIC AGENT INTO JOINTS, PERCUTANEOUS APPROACH: ICD-10-PCS | Performed by: PAIN MEDICINE

## 2024-04-29 PROCEDURE — 99222 1ST HOSP IP/OBS MODERATE 55: CPT | Performed by: NEUROLOGICAL SURGERY

## 2024-04-29 PROCEDURE — 3600000004 HC SURGERY LEVEL 4 BASE: Performed by: PAIN MEDICINE

## 2024-04-29 RX ORDER — AMLODIPINE BESYLATE 10 MG/1
10 TABLET ORAL DAILY
Status: DISCONTINUED | OUTPATIENT
Start: 2024-04-30 | End: 2024-05-04 | Stop reason: HOSPADM

## 2024-04-29 RX ORDER — CYCLOBENZAPRINE HCL 10 MG
10 TABLET ORAL 3 TIMES DAILY PRN
Status: CANCELLED | OUTPATIENT
Start: 2024-04-29

## 2024-04-29 RX ORDER — POTASSIUM CHLORIDE 7.45 MG/ML
10 INJECTION INTRAVENOUS PRN
Status: DISCONTINUED | OUTPATIENT
Start: 2024-04-29 | End: 2024-05-04 | Stop reason: HOSPADM

## 2024-04-29 RX ORDER — MAGNESIUM SULFATE IN WATER 40 MG/ML
2000 INJECTION, SOLUTION INTRAVENOUS PRN
Status: CANCELLED | OUTPATIENT
Start: 2024-04-29

## 2024-04-29 RX ORDER — BISACODYL 5 MG/1
5 TABLET, DELAYED RELEASE ORAL DAILY
Status: DISCONTINUED | OUTPATIENT
Start: 2024-04-30 | End: 2024-05-04 | Stop reason: HOSPADM

## 2024-04-29 RX ORDER — LOSARTAN POTASSIUM 100 MG/1
100 TABLET ORAL DAILY
Status: CANCELLED | OUTPATIENT
Start: 2024-04-30

## 2024-04-29 RX ORDER — PANTOPRAZOLE SODIUM 40 MG/1
40 TABLET, DELAYED RELEASE ORAL
Status: DISCONTINUED | OUTPATIENT
Start: 2024-04-30 | End: 2024-05-04 | Stop reason: HOSPADM

## 2024-04-29 RX ORDER — POTASSIUM CHLORIDE 7.45 MG/ML
10 INJECTION INTRAVENOUS PRN
Status: CANCELLED | OUTPATIENT
Start: 2024-04-29

## 2024-04-29 RX ORDER — LIDOCAINE 4 G/G
2 PATCH TOPICAL DAILY
Status: CANCELLED | OUTPATIENT
Start: 2024-04-30

## 2024-04-29 RX ORDER — TRIAMCINOLONE ACETONIDE 40 MG/ML
INJECTION, SUSPENSION INTRA-ARTICULAR; INTRAMUSCULAR PRN
Status: DISCONTINUED | OUTPATIENT
Start: 2024-04-29 | End: 2024-04-29 | Stop reason: ALTCHOICE

## 2024-04-29 RX ORDER — GLUCAGON 1 MG/ML
1 KIT INJECTION PRN
Status: DISCONTINUED | OUTPATIENT
Start: 2024-04-29 | End: 2024-05-04 | Stop reason: HOSPADM

## 2024-04-29 RX ORDER — SODIUM CHLORIDE 0.9 % (FLUSH) 0.9 %
5-40 SYRINGE (ML) INJECTION PRN
Status: CANCELLED | OUTPATIENT
Start: 2024-04-29

## 2024-04-29 RX ORDER — ONDANSETRON 2 MG/ML
4 INJECTION INTRAMUSCULAR; INTRAVENOUS EVERY 6 HOURS PRN
Status: DISCONTINUED | OUTPATIENT
Start: 2024-04-29 | End: 2024-05-04 | Stop reason: HOSPADM

## 2024-04-29 RX ORDER — POTASSIUM CHLORIDE 20 MEQ/1
40 TABLET, EXTENDED RELEASE ORAL PRN
Status: DISCONTINUED | OUTPATIENT
Start: 2024-04-29 | End: 2024-05-04 | Stop reason: HOSPADM

## 2024-04-29 RX ORDER — SODIUM CHLORIDE 9 MG/ML
INJECTION, SOLUTION INTRAVENOUS PRN
Status: DISCONTINUED | OUTPATIENT
Start: 2024-04-29 | End: 2024-05-04 | Stop reason: HOSPADM

## 2024-04-29 RX ORDER — SODIUM CHLORIDE 0.9 % (FLUSH) 0.9 %
5-40 SYRINGE (ML) INJECTION PRN
Status: DISCONTINUED | OUTPATIENT
Start: 2024-04-29 | End: 2024-05-04 | Stop reason: HOSPADM

## 2024-04-29 RX ORDER — ONDANSETRON 2 MG/ML
4 INJECTION INTRAMUSCULAR; INTRAVENOUS EVERY 6 HOURS PRN
Status: CANCELLED | OUTPATIENT
Start: 2024-04-29

## 2024-04-29 RX ORDER — HYDROCHLOROTHIAZIDE 25 MG/1
25 TABLET ORAL
Status: DISCONTINUED | OUTPATIENT
Start: 2024-04-30 | End: 2024-05-04 | Stop reason: HOSPADM

## 2024-04-29 RX ORDER — ONDANSETRON 4 MG/1
4 TABLET, ORALLY DISINTEGRATING ORAL EVERY 8 HOURS PRN
Status: DISCONTINUED | OUTPATIENT
Start: 2024-04-29 | End: 2024-05-04 | Stop reason: HOSPADM

## 2024-04-29 RX ORDER — POLYETHYLENE GLYCOL 3350 17 G/17G
17 POWDER, FOR SOLUTION ORAL DAILY PRN
Status: DISCONTINUED | OUTPATIENT
Start: 2024-04-29 | End: 2024-05-04 | Stop reason: HOSPADM

## 2024-04-29 RX ORDER — BISACODYL 5 MG/1
5 TABLET, DELAYED RELEASE ORAL DAILY
Status: CANCELLED | OUTPATIENT
Start: 2024-04-30

## 2024-04-29 RX ORDER — ACETAMINOPHEN 650 MG/1
650 SUPPOSITORY RECTAL EVERY 6 HOURS PRN
Status: DISCONTINUED | OUTPATIENT
Start: 2024-04-29 | End: 2024-04-30

## 2024-04-29 RX ORDER — POLYETHYLENE GLYCOL 3350 17 G/17G
17 POWDER, FOR SOLUTION ORAL DAILY PRN
Status: CANCELLED | OUTPATIENT
Start: 2024-04-29

## 2024-04-29 RX ORDER — MECOBALAMIN 5000 MCG
5 TABLET,DISINTEGRATING ORAL NIGHTLY
Status: CANCELLED | OUTPATIENT
Start: 2024-04-29

## 2024-04-29 RX ORDER — ATORVASTATIN CALCIUM 20 MG/1
20 TABLET, FILM COATED ORAL NIGHTLY
Status: CANCELLED | OUTPATIENT
Start: 2024-04-29

## 2024-04-29 RX ORDER — GLUCAGON 1 MG/ML
1 KIT INJECTION PRN
Status: CANCELLED | OUTPATIENT
Start: 2024-04-29

## 2024-04-29 RX ORDER — GABAPENTIN 300 MG/1
600 CAPSULE ORAL 2 TIMES DAILY
Status: CANCELLED | OUTPATIENT
Start: 2024-04-29

## 2024-04-29 RX ORDER — SODIUM CHLORIDE 0.9 % (FLUSH) 0.9 %
5-40 SYRINGE (ML) INJECTION EVERY 12 HOURS SCHEDULED
Status: CANCELLED | OUTPATIENT
Start: 2024-04-29

## 2024-04-29 RX ORDER — ENOXAPARIN SODIUM 100 MG/ML
30 INJECTION SUBCUTANEOUS 2 TIMES DAILY
Status: CANCELLED | OUTPATIENT
Start: 2024-04-29

## 2024-04-29 RX ORDER — MAGNESIUM SULFATE IN WATER 40 MG/ML
2000 INJECTION, SOLUTION INTRAVENOUS PRN
Status: DISCONTINUED | OUTPATIENT
Start: 2024-04-29 | End: 2024-05-04 | Stop reason: HOSPADM

## 2024-04-29 RX ORDER — CARVEDILOL 6.25 MG/1
6.25 TABLET ORAL 2 TIMES DAILY
Status: DISCONTINUED | OUTPATIENT
Start: 2024-04-29 | End: 2024-05-04 | Stop reason: HOSPADM

## 2024-04-29 RX ORDER — PANTOPRAZOLE SODIUM 40 MG/1
40 TABLET, DELAYED RELEASE ORAL
Status: CANCELLED | OUTPATIENT
Start: 2024-04-30

## 2024-04-29 RX ORDER — ACETAMINOPHEN 325 MG/1
650 TABLET ORAL EVERY 6 HOURS PRN
Status: CANCELLED | OUTPATIENT
Start: 2024-04-29

## 2024-04-29 RX ORDER — MECOBALAMIN 5000 MCG
5 TABLET,DISINTEGRATING ORAL NIGHTLY
Status: DISCONTINUED | OUTPATIENT
Start: 2024-04-29 | End: 2024-05-04 | Stop reason: HOSPADM

## 2024-04-29 RX ORDER — POTASSIUM CHLORIDE 20 MEQ/1
40 TABLET, EXTENDED RELEASE ORAL PRN
Status: CANCELLED | OUTPATIENT
Start: 2024-04-29

## 2024-04-29 RX ORDER — ATORVASTATIN CALCIUM 20 MG/1
20 TABLET, FILM COATED ORAL NIGHTLY
Status: DISCONTINUED | OUTPATIENT
Start: 2024-04-29 | End: 2024-05-04 | Stop reason: HOSPADM

## 2024-04-29 RX ORDER — OXYCODONE HYDROCHLORIDE AND ACETAMINOPHEN 5; 325 MG/1; MG/1
2 TABLET ORAL EVERY 4 HOURS PRN
Status: DISCONTINUED | OUTPATIENT
Start: 2024-04-29 | End: 2024-04-30

## 2024-04-29 RX ORDER — LIDOCAINE 4 G/G
2 PATCH TOPICAL DAILY
Status: DISCONTINUED | OUTPATIENT
Start: 2024-04-30 | End: 2024-04-30

## 2024-04-29 RX ORDER — CYCLOBENZAPRINE HCL 10 MG
10 TABLET ORAL 3 TIMES DAILY PRN
Status: DISCONTINUED | OUTPATIENT
Start: 2024-04-29 | End: 2024-05-02

## 2024-04-29 RX ORDER — CARVEDILOL 3.12 MG/1
6.25 TABLET ORAL 2 TIMES DAILY
Status: CANCELLED | OUTPATIENT
Start: 2024-04-29

## 2024-04-29 RX ORDER — ONDANSETRON 4 MG/1
4 TABLET, ORALLY DISINTEGRATING ORAL EVERY 8 HOURS PRN
Status: CANCELLED | OUTPATIENT
Start: 2024-04-29

## 2024-04-29 RX ORDER — ENOXAPARIN SODIUM 100 MG/ML
30 INJECTION SUBCUTANEOUS 2 TIMES DAILY
Status: DISCONTINUED | OUTPATIENT
Start: 2024-04-29 | End: 2024-05-04 | Stop reason: HOSPADM

## 2024-04-29 RX ORDER — AMLODIPINE BESYLATE 10 MG/1
10 TABLET ORAL DAILY
Status: CANCELLED | OUTPATIENT
Start: 2024-04-30

## 2024-04-29 RX ORDER — SODIUM CHLORIDE 0.9 % (FLUSH) 0.9 %
5-40 SYRINGE (ML) INJECTION EVERY 12 HOURS SCHEDULED
Status: DISCONTINUED | OUTPATIENT
Start: 2024-04-29 | End: 2024-05-04 | Stop reason: HOSPADM

## 2024-04-29 RX ORDER — SODIUM CHLORIDE 9 MG/ML
INJECTION, SOLUTION INTRAVENOUS PRN
Status: CANCELLED | OUTPATIENT
Start: 2024-04-29

## 2024-04-29 RX ORDER — LOSARTAN POTASSIUM 50 MG/1
100 TABLET ORAL DAILY
Status: DISCONTINUED | OUTPATIENT
Start: 2024-04-30 | End: 2024-05-04 | Stop reason: HOSPADM

## 2024-04-29 RX ORDER — DEXTROSE MONOHYDRATE 100 MG/ML
INJECTION, SOLUTION INTRAVENOUS CONTINUOUS PRN
Status: DISCONTINUED | OUTPATIENT
Start: 2024-04-29 | End: 2024-05-04 | Stop reason: HOSPADM

## 2024-04-29 RX ORDER — DEXTROSE MONOHYDRATE 100 MG/ML
INJECTION, SOLUTION INTRAVENOUS CONTINUOUS PRN
Status: CANCELLED | OUTPATIENT
Start: 2024-04-29

## 2024-04-29 RX ORDER — OXYCODONE HYDROCHLORIDE AND ACETAMINOPHEN 5; 325 MG/1; MG/1
2 TABLET ORAL EVERY 4 HOURS PRN
Status: CANCELLED | OUTPATIENT
Start: 2024-04-29

## 2024-04-29 RX ORDER — HYDROCHLOROTHIAZIDE 25 MG/1
25 TABLET ORAL
Status: CANCELLED | OUTPATIENT
Start: 2024-04-30

## 2024-04-29 RX ORDER — FENOFIBRATE 54 MG/1
54 TABLET ORAL DAILY
Status: CANCELLED | OUTPATIENT
Start: 2024-04-30

## 2024-04-29 RX ORDER — FENOFIBRATE 54 MG/1
54 TABLET ORAL DAILY
Status: DISCONTINUED | OUTPATIENT
Start: 2024-04-30 | End: 2024-05-04 | Stop reason: HOSPADM

## 2024-04-29 RX ORDER — INSULIN LISPRO 100 [IU]/ML
0-8 INJECTION, SOLUTION INTRAVENOUS; SUBCUTANEOUS
Status: DISCONTINUED | OUTPATIENT
Start: 2024-04-30 | End: 2024-05-04 | Stop reason: HOSPADM

## 2024-04-29 RX ORDER — INSULIN LISPRO 100 [IU]/ML
0-4 INJECTION, SOLUTION INTRAVENOUS; SUBCUTANEOUS NIGHTLY
Status: CANCELLED | OUTPATIENT
Start: 2024-04-29

## 2024-04-29 RX ORDER — INSULIN LISPRO 100 [IU]/ML
0-8 INJECTION, SOLUTION INTRAVENOUS; SUBCUTANEOUS
Status: CANCELLED | OUTPATIENT
Start: 2024-04-29

## 2024-04-29 RX ORDER — INSULIN LISPRO 100 [IU]/ML
0-4 INJECTION, SOLUTION INTRAVENOUS; SUBCUTANEOUS NIGHTLY
Status: DISCONTINUED | OUTPATIENT
Start: 2024-04-29 | End: 2024-05-04 | Stop reason: HOSPADM

## 2024-04-29 RX ORDER — BUPIVACAINE HYDROCHLORIDE 5 MG/ML
INJECTION, SOLUTION EPIDURAL; INTRACAUDAL PRN
Status: DISCONTINUED | OUTPATIENT
Start: 2024-04-29 | End: 2024-04-29 | Stop reason: ALTCHOICE

## 2024-04-29 RX ORDER — ACETAMINOPHEN 650 MG/1
650 SUPPOSITORY RECTAL EVERY 6 HOURS PRN
Status: CANCELLED | OUTPATIENT
Start: 2024-04-29

## 2024-04-29 RX ORDER — ACETAMINOPHEN 325 MG/1
650 TABLET ORAL EVERY 6 HOURS PRN
Status: DISCONTINUED | OUTPATIENT
Start: 2024-04-29 | End: 2024-04-30

## 2024-04-29 RX ORDER — GABAPENTIN 300 MG/1
600 CAPSULE ORAL 2 TIMES DAILY
Status: DISCONTINUED | OUTPATIENT
Start: 2024-04-29 | End: 2024-04-30

## 2024-04-29 RX ADMIN — CYCLOBENZAPRINE HYDROCHLORIDE 10 MG: 10 TABLET, FILM COATED ORAL at 01:05

## 2024-04-29 RX ADMIN — AMLODIPINE BESYLATE 10 MG: 10 TABLET ORAL at 09:09

## 2024-04-29 RX ADMIN — ENOXAPARIN SODIUM 30 MG: 100 INJECTION SUBCUTANEOUS at 20:47

## 2024-04-29 RX ADMIN — INSULIN LISPRO 4 UNITS: 100 INJECTION, SOLUTION INTRAVENOUS; SUBCUTANEOUS at 17:49

## 2024-04-29 RX ADMIN — CYCLOBENZAPRINE HYDROCHLORIDE 10 MG: 10 TABLET, FILM COATED ORAL at 09:09

## 2024-04-29 RX ADMIN — ATORVASTATIN CALCIUM 20 MG: 20 TABLET, FILM COATED ORAL at 20:46

## 2024-04-29 RX ADMIN — BISACODYL 5 MG: 5 TABLET, COATED ORAL at 09:08

## 2024-04-29 RX ADMIN — Medication 10 ML: at 09:16

## 2024-04-29 RX ADMIN — CARVEDILOL 6.25 MG: 3.12 TABLET, FILM COATED ORAL at 09:08

## 2024-04-29 RX ADMIN — GABAPENTIN 600 MG: 300 CAPSULE ORAL at 20:46

## 2024-04-29 RX ADMIN — OXYCODONE AND ACETAMINOPHEN 2 TABLET: 5; 325 TABLET ORAL at 03:17

## 2024-04-29 RX ADMIN — HYDROMORPHONE HYDROCHLORIDE 1 MG: 1 INJECTION, SOLUTION INTRAMUSCULAR; INTRAVENOUS; SUBCUTANEOUS at 01:04

## 2024-04-29 RX ADMIN — HYDROCHLOROTHIAZIDE 25 MG: 25 TABLET ORAL at 07:26

## 2024-04-29 RX ADMIN — CYCLOBENZAPRINE HYDROCHLORIDE 10 MG: 10 TABLET, FILM COATED ORAL at 17:46

## 2024-04-29 RX ADMIN — OXYCODONE AND ACETAMINOPHEN 2 TABLET: 5; 325 TABLET ORAL at 11:05

## 2024-04-29 RX ADMIN — Medication 5 MG: at 21:40

## 2024-04-29 RX ADMIN — OXYCODONE HYDROCHLORIDE AND ACETAMINOPHEN 2 TABLET: 5; 325 TABLET ORAL at 21:40

## 2024-04-29 RX ADMIN — Medication 10 ML: at 20:47

## 2024-04-29 RX ADMIN — LOSARTAN POTASSIUM 100 MG: 100 TABLET, FILM COATED ORAL at 09:09

## 2024-04-29 RX ADMIN — HYDROMORPHONE HYDROCHLORIDE 1 MG: 1 INJECTION, SOLUTION INTRAMUSCULAR; INTRAVENOUS; SUBCUTANEOUS at 05:19

## 2024-04-29 RX ADMIN — PANTOPRAZOLE SODIUM 40 MG: 40 TABLET, DELAYED RELEASE ORAL at 07:26

## 2024-04-29 RX ADMIN — OXYCODONE AND ACETAMINOPHEN 2 TABLET: 5; 325 TABLET ORAL at 07:26

## 2024-04-29 RX ADMIN — FENOFIBRATE 160 MG: 160 TABLET ORAL at 09:08

## 2024-04-29 RX ADMIN — HYDROMORPHONE HYDROCHLORIDE 1 MG: 1 INJECTION, SOLUTION INTRAMUSCULAR; INTRAVENOUS; SUBCUTANEOUS at 13:03

## 2024-04-29 RX ADMIN — CARVEDILOL 6.25 MG: 6.25 TABLET, FILM COATED ORAL at 20:46

## 2024-04-29 RX ADMIN — GABAPENTIN 600 MG: 300 CAPSULE ORAL at 09:08

## 2024-04-29 RX ADMIN — HYDROMORPHONE HYDROCHLORIDE 1 MG: 1 INJECTION, SOLUTION INTRAMUSCULAR; INTRAVENOUS; SUBCUTANEOUS at 09:09

## 2024-04-29 RX ADMIN — OXYCODONE AND ACETAMINOPHEN 2 TABLET: 5; 325 TABLET ORAL at 17:47

## 2024-04-29 ASSESSMENT — PAIN DESCRIPTION - DESCRIPTORS
DESCRIPTORS: ACHING;DISCOMFORT
DESCRIPTORS: DISCOMFORT
DESCRIPTORS: ACHING;DISCOMFORT
DESCRIPTORS: ACHING;DISCOMFORT;SHARP
DESCRIPTORS: ACHING;DISCOMFORT

## 2024-04-29 ASSESSMENT — PAIN SCALES - GENERAL
PAINLEVEL_OUTOF10: 9
PAINLEVEL_OUTOF10: 8
PAINLEVEL_OUTOF10: 10
PAINLEVEL_OUTOF10: 5
PAINLEVEL_OUTOF10: 9
PAINLEVEL_OUTOF10: 4
PAINLEVEL_OUTOF10: 9
PAINLEVEL_OUTOF10: 9

## 2024-04-29 ASSESSMENT — PAIN DESCRIPTION - ORIENTATION
ORIENTATION: RIGHT;LOWER
ORIENTATION: LOWER;RIGHT
ORIENTATION: RIGHT;LOWER

## 2024-04-29 ASSESSMENT — ENCOUNTER SYMPTOMS
SHORTNESS OF BREATH: 0
SORE THROAT: 0
BACK PAIN: 1
EYE REDNESS: 0
WHEEZING: 0
VOMITING: 0
COUGH: 0
STRIDOR: 0
PHOTOPHOBIA: 0
NAUSEA: 0
EYE PAIN: 0
ABDOMINAL DISTENTION: 0
BLOOD IN STOOL: 0
CONSTIPATION: 1
ABDOMINAL PAIN: 0
DIARRHEA: 0
TROUBLE SWALLOWING: 0

## 2024-04-29 ASSESSMENT — PAIN DESCRIPTION - LOCATION
LOCATION: BACK
LOCATION: BACK;LEG
LOCATION: BACK
LOCATION: BACK;LEG

## 2024-04-29 ASSESSMENT — PAIN - FUNCTIONAL ASSESSMENT
PAIN_FUNCTIONAL_ASSESSMENT: PREVENTS OR INTERFERES SOME ACTIVE ACTIVITIES AND ADLS

## 2024-04-29 NOTE — PROGRESS NOTES
Call made to Dr. Ruff message left that pt was on unit.   
[facility] OCCUPATIONAL THERAPY EVALUATION - ACUTE     NAME: Will Bravo  : 1960 (63 y.o.)  MRN: [id number]  CODE STATUS: Full Code  Room: 95/W295-01    Date of Service: 2024    Patient Diagnosis(es): Paresthesias [R20.2]  Intractable back pain [M54.9]  Acute post-operative pain [G89.18]  Postoperative pain after spinal surgery [G89.18]   Patient Active Problem List    Diagnosis Date Noted    Intractable back pain 2024    Acute post-operative pain 2024    Back spasm 2024    Post-op pain- L4-5 MICRODISECTOMY DECOMPRESSION (Spine Lumbar) 2024    Impaired mobility and activities of daily living dt  L4-5 MICRODISECTOMY DECOMPRESSION (Spine Lumbar) 2024    Poorly controlled diabetes mellitus (HCC) 2024    Right lumbar radiculitis 2024    Myelopathy concurrent with and due to stenosis of lumbar spine (HCC) 2024    Class 3 severe obesity due to excess calories with serious comorbidity and body mass index (BMI) of 40.0 to 44.9 in adult (HCC) 2024    Spinal stenosis of lumbar region with neurogenic claudication 2024    Swelling of upper arm 2024    Strain of lumbar region 2024    History of renal cell cancer 2023    Portal hypertension (HCC) 2023    Obesity, Class III, BMI 40-49.9 (morbid obesity) (HCC) 2023    Situational depression 2022    Stress at home 2022    Hypercalcemia 2022    Hepatitis A immune 2022    Metabolic syndrome 2022    Diabetic nephropathy associated with type 2 diabetes mellitus (HCC) 2022    Albuminuria 2021    Cirrhosis of liver without ascites (HCC) 11/10/2020    Hepatic steatosis 11/10/2020    Chronic pain of right knee 2020    Esophageal disorder 2019    Pure hypercholesterolemia, unspecified 2019    Anemia, unspecified 2018    Sleep apnea, unspecified 2018    Low back pain 2016    Osteoarthritis of spine with 
Physical Therapy Missed Treatment   Facility/Department: [facility] MED SURG W295/W295-01    NAME: Will Bravo    : 1960 (63 y.o.)  MRN: [id number]    Account: [id number]  Gender: male      Attempted to see pt for PT eval. Pt leaving unit upon therapist arrival.      Will follow and attempt PT evaluation again at earliest availability.       Roxann Olivares, PT, 24 at 11:11 AM   
Report received from 2W PATEL Pruitt.   
complete the activity  Dependent = pt requires total physical assistance to accomplish the task

## 2024-04-29 NOTE — OP NOTE
Operative Note      Patient: Will Bravo  YOB: 1960  MRN: [id number]    Date of Procedure: 4/29/2024    Pre-Op Diagnosis Codes:     * Intractable back pain [M54.9]  Sacroilitis Right side    Post-Op Diagnosis: Same       Procedure(s):  SI Joint injection vs Transforaminal Epidural steroid injection.Right side under fluroscopy    Surgeon(s):  Jorge Tyler MD    Assistant:   * No surgical staff found *    Anesthesia: Local    Estimated Blood Loss (mL): Minimal    Complications: None    Specimens:   * No specimens in log *    Implants:  * No implants in log *      Drains:   [REMOVED] Closed/Suction Drain Inferior Back Accordion (Removed)   Site Description Clean, dry & intact 04/23/24 0910   Dressing Status Clean, dry & intact 04/23/24 0910   Drainage Appearance Bloody;Bright red 04/23/24 0910   Drain Status Compressed 04/23/24 0910   Output (ml) 5 ml 04/23/24 0910       Findings:  Infection Present At Time Of Surgery (PATOS) (choose all levels that have infection present):  No infection present  Other Findings: None    Detailed Description of Procedure:   Right Sacroiliac joint injection:.  Discussed procedure, Risk and complications with patient and addressed  Concerns.  Informed Consent Obtained. Prone on OR Table, skin site for initial needle placement marked using Fluroscopy, Anesthetised with 2% lidocaine 2cc  . 23g needle advanced towards inferior aspects of SI Joint on Right side,  Injected 2cc 0.5% Bupivacaine and 40mg Kenalog into Right SI joint with good relief, Skin washed with wet Towel and Pt discharged to floor in stable condition.    Electronically signed by Jorge Tyler MD on 4/29/2024 at 12:11 PM

## 2024-04-29 NOTE — PLAN OF CARE
Problem: Pain  Goal: Verbalizes/displays adequate comfort level or baseline comfort level  4/29/2024 0332 by Daphne Delong RN  Outcome: Progressing  Flowsheets (Taken 4/29/2024 0332)  Verbalizes/displays adequate comfort level or baseline comfort level: Assess pain using appropriate pain scale  4/28/2024 1737 by Mary Chino RN  Outcome: Not Progressing     Problem: Safety - Adult  Goal: Free from fall injury  4/29/2024 0332 by Daphne Delong RN  Outcome: Progressing  Flowsheets (Taken 4/29/2024 0332)  Free From Fall Injury: Instruct family/caregiver on patient safety  4/28/2024 1737 by Mary Chino RN  Outcome: Progressing     Problem: ABCDS Injury Assessment  Goal: Absence of physical injury  Outcome: Progressing  Flowsheets (Taken 4/29/2024 0332)  Absence of Physical Injury: Implement safety measures based on patient assessment     Problem: Pain  Goal: Verbalizes/displays adequate comfort level or baseline comfort level  4/29/2024 0332 by Daphne Delong RN  Outcome: Progressing  Flowsheets (Taken 4/29/2024 0332)  Verbalizes/displays adequate comfort level or baseline comfort level: Assess pain using appropriate pain scale  4/28/2024 1737 by Mary Chino RN  Outcome: Not Progressing   Care plan reviewed with patient.  Patient verbalize understanding of the plan of care and contribute to goal setting.

## 2024-04-29 NOTE — CONSULTS
Department of Chronic Pain Managment  Attending Progress Note      SUBJECTIVE  Low back pain    OBJECTIVE: Pt c/o Low back and Rt Leg pain 3-4 week duration evaluated by surgeon and Had Lumbar Decompression few days ago came on Dilaudid 0.5mg Q 4 hrs and 10mg Oxycodone q 6hrs Prn with not enough Relief.    Medications  Current Facility-Administered Medications: sodium chloride flush 0.9 % injection 5-40 mL, 5-40 mL, IntraVENous, 2 times per day  sodium chloride flush 0.9 % injection 5-40 mL, 5-40 mL, IntraVENous, PRN  0.9 % sodium chloride infusion, , IntraVENous, PRN  potassium chloride (KLOR-CON M) extended release tablet 40 mEq, 40 mEq, Oral, PRN **OR** potassium bicarb-citric acid (EFFER-K) effervescent tablet 40 mEq, 40 mEq, Oral, PRN **OR** potassium chloride 10 mEq/100 mL IVPB (Peripheral Line), 10 mEq, IntraVENous, PRN  magnesium sulfate 2000 mg in 50 mL IVPB premix, 2,000 mg, IntraVENous, PRN  enoxaparin Sodium (LOVENOX) injection 30 mg, 30 mg, SubCUTAneous, BID  ondansetron (ZOFRAN-ODT) disintegrating tablet 4 mg, 4 mg, Oral, Q8H PRN **OR** ondansetron (ZOFRAN) injection 4 mg, 4 mg, IntraVENous, Q6H PRN  polyethylene glycol (GLYCOLAX) packet 17 g, 17 g, Oral, Daily PRN  acetaminophen (TYLENOL) tablet 650 mg, 650 mg, Oral, Q6H PRN **OR** acetaminophen (TYLENOL) suppository 650 mg, 650 mg, Rectal, Q6H PRN  oxyCODONE-acetaminophen (PERCOCET) 5-325 MG per tablet 2 tablet, 2 tablet, Oral, Q6H PRN  glucose chewable tablet 16 g, 4 tablet, Oral, PRN  dextrose bolus 10% 125 mL, 125 mL, IntraVENous, PRN **OR** dextrose bolus 10% 250 mL, 250 mL, IntraVENous, PRN  glucagon injection 1 mg, 1 mg, SubCUTAneous, PRN  dextrose 10 % infusion, , IntraVENous, Continuous PRN  insulin lispro (HUMALOG) injection vial 0-8 Units, 0-8 Units, SubCUTAneous, TID WC  insulin lispro (HUMALOG) injection vial 0-4 Units, 0-4 Units, SubCUTAneous, Nightly  cyclobenzaprine (FLEXERIL) tablet 10 mg, 10 mg, Oral, TID PRN  lidocaine 4 % external 
potassium bicarb-citric acid (EFFER-K) effervescent tablet 40 mEq     potassium chloride 10 mEq/100 mL IVPB (Peripheral Line)    magnesium sulfate 2000 mg in 50 mL IVPB premix    enoxaparin Sodium (LOVENOX) injection 30 mg     Order Specific Question:   Indication of Use     Answer:   Prophylaxis-DVT/PE    OR Linked Order Group     ondansetron (ZOFRAN-ODT) disintegrating tablet 4 mg     ondansetron (ZOFRAN) injection 4 mg    polyethylene glycol (GLYCOLAX) packet 17 g    OR Linked Order Group     acetaminophen (TYLENOL) tablet 650 mg     acetaminophen (TYLENOL) suppository 650 mg    DISCONTD: oxyCODONE-acetaminophen (PERCOCET) 5-325 MG per tablet 2 tablet    glucose chewable tablet 16 g    OR Linked Order Group     dextrose bolus 10% 125 mL     dextrose bolus 10% 250 mL    glucagon injection 1 mg    dextrose 10 % infusion    insulin lispro (HUMALOG) injection vial 0-8 Units    insulin lispro (HUMALOG) injection vial 0-4 Units    DISCONTD: gabapentin (NEURONTIN) capsule 300 mg    cyclobenzaprine (FLEXERIL) tablet 10 mg    lidocaine 4 % external patch 2 patch    amLODIPine (NORVASC) tablet 10 mg    atorvastatin (LIPITOR) tablet 20 mg    bisacodyl (DULCOLAX) EC tablet 5 mg    carvedilol (COREG) tablet 6.25 mg    fenofibrate (TRIGLIDE) tablet 160 mg    hydroCHLOROthiazide (HYDRODIURIL) tablet 25 mg    losartan (COZAAR) tablet 100 mg    pantoprazole (PROTONIX) tablet 40 mg    DISCONTD: HYDROmorphone (DILAUDID) injection 0.5 mg    gabapentin (NEURONTIN) capsule 600 mg    insulin lispro (HUMALOG) injection vial 6 Units    melatonin disintegrating tablet 5 mg    oxyCODONE-acetaminophen (PERCOCET) 5-325 MG per tablet 2 tablet    HYDROmorphone HCl PF (DILAUDID) injection 1 mg               Electronically signed by Nick Bell MD on 4/29/2024 at 8:37 AM            
rehabilitation facility patient's only)    Deficits:mobility, impaired gait, high risk for falls, deconditioning , debility, ADL's, and pain limiting function    Disability:mobility, locomotion, and self care    Potential barriers to progress/discharge:complex medical conditions and severe pain         It was my pleasure to evaluate Will DuvallLawrence today.  Please call [phone] with questions.    Charlene Ruff, DO

## 2024-04-29 NOTE — PROGRESS NOTES
Pt to unit at 1900, alert and oriented, resp wnl, vs updated, pt states he has pain 9/10 to lower right back, wife in room, call light in reach, report passe along to night shift.

## 2024-04-29 NOTE — CARE COORDINATION
Case Management Assessment  Initial Evaluation    Date/Time of Evaluation: 4/28/2024 11:39 AM  Assessment Completed by: Isabella Conti RN    If patient is discharged prior to next notation, then this note serves as note for discharge by case management.    Patient Name: Will Bravo                   YOB: 1960  Diagnosis: Paresthesias [R20.2]  Intractable back pain [M54.9]  Acute post-operative pain [G89.18]  Postoperative pain after spinal surgery [G89.18]                   Date / Time: 4/28/2024  3:23 AM    Patient Admission Status: Observation   Readmission Risk (Low < 19, Mod (19-27), High > 27): Readmission Risk Score: 8.5    Current PCP: Magaly Nolan MD  PCP verified by ? Yes    Chart Reviewed: Yes      History Provided by: Child/Family (daughter as pt asleep)  Patient Orientation: Alert and Oriented, Person, Place, Situation, Self    Patient Cognition: Alert    Hospitalization in the last 30 days (Readmission):  Yes    If yes, Readmission Assessment in  Navigator will be completed.    Advance Directives:      Code Status: Full Code   Patient's Primary Decision Maker is: Legal Next of Kin      Discharge Planning:    Patient lives with:   Type of Home:    Primary Care Giver: Self  Patient Support Systems include: Spouse/Significant Other, Children   Current Financial resources:    Current community resources:    Current services prior to admission:              Current DME:              Type of Home Care services:       ADLS  Prior functional level: Independent in ADLs/IADLs  Current functional level: Independent in ADLs/IADLs    PT AM-PAC:   /24  OT AM-PAC:   /24    Family can provide assistance at DC: Yes  Would you like Case Management to discuss the discharge plan with any other family members/significant others, and if so, who? Yes (daughter or spouse)  Plans to Return to Present Housing: Yes  Other Identified Issues/Barriers to RETURNING to current housing: PAIN  Potential 
Inpatient Rehab referral received. Met with patient and wife explained [facility] program and requirements, including 3 hours of intense therapy daily,weekly team meetings,  anticipated length of stay and goal of discharge to home. All questions answered and patient and wife verbalized understanding. Pt leaves in one level house and 2 stairs to enter house.  Electronically signed by Amber Serrato RN on 4/29/2024 at 1:25 PM   
MET WITH PT AND WIFE, AT BEDSIDE. PT IS UNSURE IF HE CAN RETURN HOME. FOC IS OFFERED, HE WOULD LIKE [facility] REHAB IF ACCEPTED. ORDERS OBTAINED FOR PT/OT AND REHAB REFERRAL. PAIN MANAGEMENT FOLLOWING.   
SPOKE WITH JAY JAY OF [facility] ACUTE REH[facility]. PT ACCEPTED TO ROOM 248, NURSING UPDATED.   
Spoke with CM on 2wt that pt is accepted but they need to check with Dr. Devries if it is ok to transfer to Rehab due to him having a block today. Pt can admit to room 248 pending medical clearance.  Electronically signed by Amber Serrato RN on 4/29/2024 at 4:17 PM   
L4-5 MICRODISECTOMY DECOMPRESSION performed by Alda Kingston MD at [facility] OR    SKIN GRAFT Right 1972    [facility], RIGHT LEG    SPINE SURGERY  06/29/2015    LUMBAR    TONSILLECTOMY  1972       Advanced Directives:  Advance Directives       Power of  Living Will ACP-Advance Directive ACP-Power of     Not on File Not on File Not on File Not on File            Labs/Infection Control:  Recent Labs     04/27/24  1845 04/27/24  1910 04/28/24  0752 04/29/24  0535   WBC 9.9  --  8.3 8.0   HGB 12.5*  --  12.0* 10.8*   HCT 37.0*  --  34.8* 32.5*     --  218 218   BUN 27*  --  25* 27*   CREATININE 0.94 1.0 1.08 0.93   GLUCOSE 124*  --  138* 182*     --  137 138   K 4.1  --  4.7 4.6   CALCIUM 10.5*  --  10.0* 10.0*   PROT 8.3*  --   --   --       Blood cultures:  No results for input(s): \"BC\" in the last 72 hours.    Urinalysis/C&S:  No results for input(s): \"NITRITE\", \"LABCAST\", \"WBCUA\", \"RBCUA\", \"MUCUS\", \"TRICHOMONAS\", \"YEAST\", \"BACTERIA\", \"LEUKOCYTESUR\", \"BLOODU\", \"GLUCOSEU\", \"KETUA\", \"AMORPHOUS\", \"LABURIN\" in the last 72 hours.    Radiology:  XR PELVIS (1-2 VIEWS)  Result Date: 4/28/2024  Minimal degenerative changes in the sacroiliac joints bilaterally. No acute bony abnormality.     CT LUMBAR SPINE W CONTRAST  Result Date: 4/27/2024  1. Multilevel degenerative spondylosis with spinal canal and neural foraminal narrowing as described above. 2. The patient underwent a recent right laminectomy at L4-L5. Small air bubbles are noted at the laminectomy sites likely due to recent surgery. There is no definite hematoma in the adjacent soft tissues.     FLUORO FOR SURGICAL PROCEDURES  Result Date: 4/22/2024   Intraprocedural fluoroscopic spot images as above.  See separate procedure report for more information.     MR lumbar spine w and wo IV contrast  Result Date: 4/16/2024  Multilevel degenerative disc disease and facet arthrosis most pronounced at L4-L5 with moderate to severe spinal canal stenosis,

## 2024-04-30 ENCOUNTER — APPOINTMENT (OUTPATIENT)
Dept: GENERAL RADIOLOGY | Age: 64
DRG: 560 | End: 2024-04-30
Attending: PHYSICAL MEDICINE & REHABILITATION
Payer: MEDICARE

## 2024-04-30 PROBLEM — Z74.09 IMPAIRED MOBILITY AND ADLS: Status: ACTIVE | Noted: 2024-04-30

## 2024-04-30 PROBLEM — M96.1 LUMBAR POST-LAMINECTOMY SYNDROME: Status: ACTIVE | Noted: 2024-04-30

## 2024-04-30 PROBLEM — Z78.9 IMPAIRED MOBILITY AND ADLS: Status: ACTIVE | Noted: 2024-04-30

## 2024-04-30 LAB
ANION GAP SERPL CALCULATED.3IONS-SCNC: 14 MEQ/L (ref 9–15)
BASOPHILS # BLD: 0 K/UL (ref 0–0.2)
BASOPHILS NFR BLD: 0.5 %
BUN SERPL-MCNC: 29 MG/DL (ref 8–23)
CALCIUM SERPL-MCNC: 9.4 MG/DL (ref 8.5–9.9)
CHLORIDE SERPL-SCNC: 97 MEQ/L (ref 95–107)
CO2 SERPL-SCNC: 26 MEQ/L (ref 20–31)
CREAT SERPL-MCNC: 1.04 MG/DL (ref 0.7–1.2)
EOSINOPHIL # BLD: 0.3 K/UL (ref 0–0.7)
EOSINOPHIL NFR BLD: 4.7 %
ERYTHROCYTE [DISTWIDTH] IN BLOOD BY AUTOMATED COUNT: 13 % (ref 11.5–14.5)
GLUCOSE BLD-MCNC: 231 MG/DL (ref 70–99)
GLUCOSE BLD-MCNC: 246 MG/DL (ref 70–99)
GLUCOSE BLD-MCNC: 271 MG/DL (ref 70–99)
GLUCOSE BLD-MCNC: 359 MG/DL (ref 70–99)
GLUCOSE SERPL-MCNC: 232 MG/DL (ref 70–99)
HCT VFR BLD AUTO: 31.9 % (ref 42–52)
HGB BLD-MCNC: 10.8 G/DL (ref 14–18)
LYMPHOCYTES # BLD: 2.7 K/UL (ref 1–4.8)
LYMPHOCYTES NFR BLD: 41.7 %
MCH RBC QN AUTO: 29.8 PG (ref 27–31.3)
MCHC RBC AUTO-ENTMCNC: 33.9 % (ref 33–37)
MCV RBC AUTO: 87.9 FL (ref 79–92.2)
MONOCYTES # BLD: 0.5 K/UL (ref 0.2–0.8)
MONOCYTES NFR BLD: 7.6 %
NEUTROPHILS # BLD: 3 K/UL (ref 1.4–6.5)
NEUTS SEG NFR BLD: 45.2 %
PERFORMED ON: ABNORMAL
PLATELET # BLD AUTO: 225 K/UL (ref 130–400)
POTASSIUM SERPL-SCNC: 4.6 MEQ/L (ref 3.4–4.9)
RBC # BLD AUTO: 3.63 M/UL (ref 4.7–6.1)
SODIUM SERPL-SCNC: 137 MEQ/L (ref 135–144)
WBC # BLD AUTO: 6.6 K/UL (ref 4.8–10.8)

## 2024-04-30 PROCEDURE — 97112 NEUROMUSCULAR REEDUCATION: CPT

## 2024-04-30 PROCEDURE — 99223 1ST HOSP IP/OBS HIGH 75: CPT | Performed by: PHYSICAL MEDICINE & REHABILITATION

## 2024-04-30 PROCEDURE — 6370000000 HC RX 637 (ALT 250 FOR IP): Performed by: PAIN MEDICINE

## 2024-04-30 PROCEDURE — 73630 X-RAY EXAM OF FOOT: CPT

## 2024-04-30 PROCEDURE — 94762 N-INVAS EAR/PLS OXIMTRY CONT: CPT

## 2024-04-30 PROCEDURE — 97116 GAIT TRAINING THERAPY: CPT

## 2024-04-30 PROCEDURE — 97110 THERAPEUTIC EXERCISES: CPT

## 2024-04-30 PROCEDURE — 6370000000 HC RX 637 (ALT 250 FOR IP): Performed by: INTERNAL MEDICINE

## 2024-04-30 PROCEDURE — 73610 X-RAY EXAM OF ANKLE: CPT

## 2024-04-30 PROCEDURE — 80048 BASIC METABOLIC PNL TOTAL CA: CPT

## 2024-04-30 PROCEDURE — 6360000002 HC RX W HCPCS: Performed by: INTERNAL MEDICINE

## 2024-04-30 PROCEDURE — 6370000000 HC RX 637 (ALT 250 FOR IP)

## 2024-04-30 PROCEDURE — 2580000003 HC RX 258: Performed by: INTERNAL MEDICINE

## 2024-04-30 PROCEDURE — 6360000002 HC RX W HCPCS: Performed by: PHYSICAL MEDICINE & REHABILITATION

## 2024-04-30 PROCEDURE — 99231 SBSQ HOSP IP/OBS SF/LOW 25: CPT | Performed by: PAIN MEDICINE

## 2024-04-30 PROCEDURE — 6370000000 HC RX 637 (ALT 250 FOR IP): Performed by: PHYSICAL MEDICINE & REHABILITATION

## 2024-04-30 PROCEDURE — 36415 COLL VENOUS BLD VENIPUNCTURE: CPT

## 2024-04-30 PROCEDURE — 97162 PT EVAL MOD COMPLEX 30 MIN: CPT

## 2024-04-30 PROCEDURE — 97166 OT EVAL MOD COMPLEX 45 MIN: CPT

## 2024-04-30 PROCEDURE — 97530 THERAPEUTIC ACTIVITIES: CPT

## 2024-04-30 PROCEDURE — 85025 COMPLETE CBC W/AUTO DIFF WBC: CPT

## 2024-04-30 PROCEDURE — 1180000000 HC REHAB R&B

## 2024-04-30 PROCEDURE — 97535 SELF CARE MNGMENT TRAINING: CPT

## 2024-04-30 RX ORDER — INSULIN GLARGINE 100 [IU]/ML
30 INJECTION, SOLUTION SUBCUTANEOUS NIGHTLY
Status: DISCONTINUED | OUTPATIENT
Start: 2024-04-30 | End: 2024-05-01

## 2024-04-30 RX ORDER — ACETAMINOPHEN 325 MG/1
650 TABLET ORAL EVERY 4 HOURS PRN
Status: DISCONTINUED | OUTPATIENT
Start: 2024-04-30 | End: 2024-05-04 | Stop reason: HOSPADM

## 2024-04-30 RX ORDER — CYANOCOBALAMIN 1000 UG/ML
1000 INJECTION, SOLUTION INTRAMUSCULAR; SUBCUTANEOUS WEEKLY
Status: DISCONTINUED | OUTPATIENT
Start: 2024-04-30 | End: 2024-05-04 | Stop reason: HOSPADM

## 2024-04-30 RX ORDER — ENEMA 19; 7 G/133ML; G/133ML
1 ENEMA RECTAL DAILY PRN
Status: DISCONTINUED | OUTPATIENT
Start: 2024-04-30 | End: 2024-05-04 | Stop reason: HOSPADM

## 2024-04-30 RX ORDER — LIDOCAINE 4 G/G
3 PATCH TOPICAL DAILY
Status: DISCONTINUED | OUTPATIENT
Start: 2024-04-30 | End: 2024-05-04 | Stop reason: HOSPADM

## 2024-04-30 RX ORDER — GABAPENTIN 300 MG/1
600 CAPSULE ORAL 4 TIMES DAILY
Status: DISCONTINUED | OUTPATIENT
Start: 2024-04-30 | End: 2024-05-04 | Stop reason: HOSPADM

## 2024-04-30 RX ORDER — UBIDECARENONE 100 MG
100 CAPSULE ORAL DAILY
Status: DISCONTINUED | OUTPATIENT
Start: 2024-04-30 | End: 2024-05-04 | Stop reason: HOSPADM

## 2024-04-30 RX ORDER — OXYCODONE AND ACETAMINOPHEN 10; 325 MG/1; MG/1
1 TABLET ORAL EVERY 4 HOURS PRN
Status: DISCONTINUED | OUTPATIENT
Start: 2024-04-30 | End: 2024-04-30

## 2024-04-30 RX ORDER — ANALGESIC BALM 1.74; 4.06 G/29G; G/29G
OINTMENT TOPICAL 3 TIMES DAILY
Status: DISCONTINUED | OUTPATIENT
Start: 2024-04-30 | End: 2024-05-04 | Stop reason: HOSPADM

## 2024-04-30 RX ORDER — OXYMETAZOLINE HYDROCHLORIDE 0.05 G/100ML
2 SPRAY NASAL 2 TIMES DAILY
Status: DISPENSED | OUTPATIENT
Start: 2024-04-30 | End: 2024-05-03

## 2024-04-30 RX ORDER — OXYCODONE HCL 10 MG/1
10 TABLET, FILM COATED, EXTENDED RELEASE ORAL EVERY 6 HOURS
Status: DISCONTINUED | OUTPATIENT
Start: 2024-04-30 | End: 2024-05-04 | Stop reason: HOSPADM

## 2024-04-30 RX ORDER — BISACODYL 10 MG
10 SUPPOSITORY, RECTAL RECTAL DAILY PRN
Status: DISCONTINUED | OUTPATIENT
Start: 2024-04-30 | End: 2024-05-04 | Stop reason: HOSPADM

## 2024-04-30 RX ORDER — OXYCODONE HCL 10 MG/1
10 TABLET, FILM COATED, EXTENDED RELEASE ORAL EVERY 8 HOURS
Status: DISCONTINUED | OUTPATIENT
Start: 2024-04-30 | End: 2024-04-30

## 2024-04-30 RX ORDER — VITAMIN B COMPLEX
2000 TABLET ORAL
Status: DISCONTINUED | OUTPATIENT
Start: 2024-04-30 | End: 2024-05-04 | Stop reason: HOSPADM

## 2024-04-30 RX ADMIN — Medication 10 ML: at 21:53

## 2024-04-30 RX ADMIN — OXYCODONE HYDROCHLORIDE 10 MG: 10 TABLET, FILM COATED, EXTENDED RELEASE ORAL at 18:12

## 2024-04-30 RX ADMIN — MENTHOL AND METHYL SALICYLATE: 7.6; 29 OINTMENT TOPICAL at 21:50

## 2024-04-30 RX ADMIN — GABAPENTIN 600 MG: 300 CAPSULE ORAL at 08:01

## 2024-04-30 RX ADMIN — FENOFIBRATE 54 MG: 54 TABLET ORAL at 08:02

## 2024-04-30 RX ADMIN — BISACODYL 5 MG: 5 TABLET, COATED ORAL at 08:02

## 2024-04-30 RX ADMIN — INSULIN LISPRO 4 UNITS: 100 INJECTION, SOLUTION INTRAVENOUS; SUBCUTANEOUS at 08:03

## 2024-04-30 RX ADMIN — LOSARTAN POTASSIUM 100 MG: 50 TABLET, FILM COATED ORAL at 08:02

## 2024-04-30 RX ADMIN — ENOXAPARIN SODIUM 30 MG: 100 INJECTION SUBCUTANEOUS at 21:45

## 2024-04-30 RX ADMIN — OXYCODONE HYDROCHLORIDE AND ACETAMINOPHEN 2 TABLET: 5; 325 TABLET ORAL at 01:48

## 2024-04-30 RX ADMIN — ENOXAPARIN SODIUM 30 MG: 100 INJECTION SUBCUTANEOUS at 08:01

## 2024-04-30 RX ADMIN — Medication 10 ML: at 08:05

## 2024-04-30 RX ADMIN — GABAPENTIN 600 MG: 300 CAPSULE ORAL at 21:46

## 2024-04-30 RX ADMIN — MENTHOL AND METHYL SALICYLATE: 7.6; 29 OINTMENT TOPICAL at 09:34

## 2024-04-30 RX ADMIN — OXYCODONE HYDROCHLORIDE AND ACETAMINOPHEN 2 TABLET: 5; 325 TABLET ORAL at 06:10

## 2024-04-30 RX ADMIN — CYANOCOBALAMIN 1000 MCG: 1000 INJECTION, SOLUTION INTRAMUSCULAR; SUBCUTANEOUS at 09:33

## 2024-04-30 RX ADMIN — CYCLOBENZAPRINE 10 MG: 10 TABLET, FILM COATED ORAL at 18:16

## 2024-04-30 RX ADMIN — ATORVASTATIN CALCIUM 20 MG: 20 TABLET, FILM COATED ORAL at 21:46

## 2024-04-30 RX ADMIN — Medication 2000 UNITS: at 17:27

## 2024-04-30 RX ADMIN — OXYCODONE AND ACETAMINOPHEN 1 TABLET: 10; 325 TABLET ORAL at 13:53

## 2024-04-30 RX ADMIN — CARVEDILOL 6.25 MG: 6.25 TABLET, FILM COATED ORAL at 21:46

## 2024-04-30 RX ADMIN — CYCLOBENZAPRINE 10 MG: 10 TABLET, FILM COATED ORAL at 01:48

## 2024-04-30 RX ADMIN — PANTOPRAZOLE SODIUM 40 MG: 40 TABLET, DELAYED RELEASE ORAL at 06:09

## 2024-04-30 RX ADMIN — INSULIN LISPRO 8 UNITS: 100 INJECTION, SOLUTION INTRAVENOUS; SUBCUTANEOUS at 17:27

## 2024-04-30 RX ADMIN — Medication 100 MG: at 09:34

## 2024-04-30 RX ADMIN — MENTHOL AND METHYL SALICYLATE: 7.6; 29 OINTMENT TOPICAL at 15:35

## 2024-04-30 RX ADMIN — CARVEDILOL 6.25 MG: 6.25 TABLET, FILM COATED ORAL at 08:02

## 2024-04-30 RX ADMIN — INSULIN LISPRO 2 UNITS: 100 INJECTION, SOLUTION INTRAVENOUS; SUBCUTANEOUS at 12:03

## 2024-04-30 RX ADMIN — ACETAMINOPHEN 650 MG: 325 TABLET ORAL at 08:01

## 2024-04-30 RX ADMIN — OXYMETAZOLINE HCL 2 SPRAY: 0.05 SPRAY NASAL at 09:34

## 2024-04-30 RX ADMIN — HYDROCHLOROTHIAZIDE 25 MG: 25 TABLET ORAL at 06:14

## 2024-04-30 RX ADMIN — AMLODIPINE BESYLATE 10 MG: 10 TABLET ORAL at 08:02

## 2024-04-30 RX ADMIN — OXYCODONE HYDROCHLORIDE 10 MG: 10 TABLET, FILM COATED, EXTENDED RELEASE ORAL at 11:16

## 2024-04-30 RX ADMIN — INSULIN GLARGINE 30 UNITS: 100 INJECTION, SOLUTION SUBCUTANEOUS at 21:48

## 2024-04-30 RX ADMIN — Medication 5 MG: at 21:46

## 2024-04-30 ASSESSMENT — ENCOUNTER SYMPTOMS
DIARRHEA: 0
EYE REDNESS: 0
COUGH: 0
SHORTNESS OF BREATH: 1
EYE PAIN: 0
NAUSEA: 0
BOWEL INCONTINENCE: 0
PHOTOPHOBIA: 0
CONSTIPATION: 1
VOMITING: 0
BACK PAIN: 1
SORE THROAT: 0
STRIDOR: 0
WHEEZING: 0
ABDOMINAL PAIN: 0
BLOOD IN STOOL: 0

## 2024-04-30 ASSESSMENT — PAIN DESCRIPTION - ORIENTATION
ORIENTATION: RIGHT

## 2024-04-30 ASSESSMENT — PAIN SCALES - GENERAL
PAINLEVEL_OUTOF10: 7
PAINLEVEL_OUTOF10: 8
PAINLEVEL_OUTOF10: 8
PAINLEVEL_OUTOF10: 6
PAINLEVEL_OUTOF10: 10
PAINLEVEL_OUTOF10: 8
PAINLEVEL_OUTOF10: 8

## 2024-04-30 ASSESSMENT — PAIN DESCRIPTION - LOCATION
LOCATION: LEG
LOCATION: BACK
LOCATION: BACK
LOCATION: GENERALIZED
LOCATION: BACK

## 2024-04-30 ASSESSMENT — PAIN DESCRIPTION - DESCRIPTORS
DESCRIPTORS: ACHING
DESCRIPTORS: DULL
DESCRIPTORS: DISCOMFORT
DESCRIPTORS: DISCOMFORT

## 2024-04-30 NOTE — CARE COORDINATION
Case Management Initial Assessment        NAME:  Will Bravo  ROOM: R248/R248-01  :  1960  DATE: 2024        Social Functional:  Social/Functional History  Lives With: Spouse  Type of Home: House  Home Layout: One level  Home Access: Stairs to enter with rails  Entrance Stairs - Number of Steps: 3  Entrance Stairs - Rails: Left  Bathroom Shower/Tub: Walk-in shower  Bathroom Toilet: Handicap height  Bathroom Equipment: Grab bars in shower  Bathroom Accessibility: Walker accessible  Home Equipment: Cane;Walker, rolling  Receives Help From: Family  ADL Assistance: Independent  Homemaking Assistance: Independent  Homemaking Responsibilities: Yes  Ambulation Assistance: Independent (no AD)  Transfer Assistance: Independent  Active : Yes  Mode of Transportation: ASSURED INFORMATION SECURITY ([employer])  Education: High school graduate  Occupation: On disability  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 Patient questions answered appropriately. Explained discharge process. Patient stated understanding.      Pt lives with wife and has adult son and daughter that are local. Pt lives in  a one level house with 3 stairs to enter and HR's on the left. Pt has a a walk in shower, handicap height toilet and grab bars in the shower. The BR is walker accessible. Pt has DME's that consist of cane and a rolling walker. Pt is an active  and drives a [employer]. Pt graduated from High School. 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 Pt is able to purchase his meds and uses a pill organizer. Pt states wife does finances and uses checks, auto pay and on line banking. Electronically signed by Amber Serrato RN on 2024 at 4:49 PM       Electronically signed by:    Amber Serrato RN,   2024, 4:39 PM

## 2024-04-30 NOTE — PROGRESS NOTES
reactive to light, extra ocular muscles intact, sclera clear, conjunctiva normal  ENT:  Normocephalic, without obvious abnormality, atraumatic, sinuses nontender on palpation, external ears without lesions, oral pharynx with moist mucus membranes, tonsils without erythema or exudates, gums normal and good dentition.  NECK:  Supple, symmetrical, trachea midline, no adenopathy, thyroid symmetric, not enlarged and no tenderness, skin normal  HEMATOLOGIC/LYMPHATICS:  no cervical lymphadenopathy and no supraclavicular lymphadenopathy  BACK:  Symmetric, no curvature, spinous processes are non-tender on palpation, paraspinous muscles are non-tender on palpation, no costal vertebral tenderness  LUNGS:  No increased work of breathing, good air exchange, clear to auscultation bilaterally, no crackles or wheezing  CARDIOVASCULAR:  Normal apical impulse, regular rate and rhythm, normal S1 and S2, no S3 or S4, and no murmur noted  ABDOMEN:  No scars, normal bowel sounds, soft, non-distended, non-tender, no masses palpated, no hepatosplenomegally  MUSCULOSKELETAL:  There is no redness, warmth, or swelling of the joints.  Full range of motion noted.  Motor strength is 5 out of 5 all extremities bilaterally.  Tone is normal.  NEUROLOGIC:  Awake, alert, oriented to name, place and time.  Cranial nerves II-XII are grossly intact.  Motor is 4/5 Rt LEout of 5 bilaterally.  Cerebellar finger to nose, heel to shin intact.  Sensory is intact.  Babinski down going, Romberg negative, and gait is normal.  Data  CBC with Differential:    Lab Results   Component Value Date/Time    WBC 6.6 04/30/2024 05:47 AM    RBC 3.63 04/30/2024 05:47 AM    HGB 10.8 04/30/2024 05:47 AM    HCT 31.9 04/30/2024 05:47 AM     04/30/2024 05:47 AM    MCV 87.9 04/30/2024 05:47 AM    MCH 29.8 04/30/2024 05:47 AM    MCHC 33.9 04/30/2024 05:47 AM    RDW 13.0 04/30/2024 05:47 AM    LYMPHOPCT 41.7 04/30/2024 05:47 AM    MONOPCT 7.6 04/30/2024 05:47 AM    BASOPCT  0.5 04/30/2024 05:47 AM    MONOSABS 0.5 04/30/2024 05:47 AM    LYMPHSABS 2.7 04/30/2024 05:47 AM    EOSABS 0.3 04/30/2024 05:47 AM    BASOSABS 0.0 04/30/2024 05:47 AM     BMP:    Lab Results   Component Value Date/Time     04/30/2024 05:47 AM    K 4.6 04/30/2024 05:47 AM    CL 97 04/30/2024 05:47 AM    CO2 26 04/30/2024 05:47 AM    BUN 29 04/30/2024 05:47 AM    CREATININE 1.04 04/30/2024 05:47 AM    CALCIUM 9.4 04/30/2024 05:47 AM    LABGLOM 81.4 04/30/2024 05:47 AM    GLUCOSE 232 04/30/2024 05:47 AM       ASSESSMENT AND PLAN  Seen Pt at 12:30  Increased Gabapentin to QID, No pain on SLRs and Gainslins today, Highlighted this to Patient, will wean down hi Percocet to QID and follow.

## 2024-04-30 NOTE — PROGRESS NOTES
Facility/Department: [facility] [place] [facility]  Rehabilitation Initial Assessment: Physical Therapy  Room: R248/R248-01    NAME: Will Bravo  : 1960  MRN: [id number]    Date of Service: 2024    Rehab Diagnosis(es): Impaired mobility and activities of daily living dt L4-5 MICRODISECTOMY DECOMPRESSION (Spine Lumbar)  Patient Active Problem List    Diagnosis Date Noted    Impaired mobility and ADLs 2024    Intractable back pain 2024    Acute post-operative pain 2024    Back spasm 2024    Post-op pain- L4-5 MICRODISECTOMY DECOMPRESSION (Spine Lumbar) 2024    Impaired mobility and activities of daily living dt  L4-5 MICRODISECTOMY DECOMPRESSION (Spine Lumbar) 2024    Poorly controlled diabetes mellitus (HCC) 2024    Right lumbar radiculitis 2024    Myelopathy concurrent with and due to stenosis of lumbar spine (HCC) 2024    Class 3 severe obesity due to excess calories with serious comorbidity and body mass index (BMI) of 40.0 to 44.9 in adult (HCC) 2024    Spinal stenosis of lumbar region with neurogenic claudication 2024    Swelling of upper arm 2024    Strain of lumbar region 2024    History of renal cell cancer 2023    Portal hypertension (HCC) 2023    Obesity, Class III, BMI 40-49.9 (morbid obesity) (HCC) 2023    Situational depression 2022    Stress at home 2022    Hypercalcemia 2022    Hepatitis A immune 2022    Metabolic syndrome 2022    Diabetic nephropathy associated with type 2 diabetes mellitus (HCC) 2022    Albuminuria 2021    Cirrhosis of liver without ascites (HCC) 11/10/2020    Hepatic steatosis 11/10/2020    Chronic pain of right knee 2020    Esophageal disorder 2019    Pure hypercholesterolemia, unspecified 2019    Anemia, unspecified 2018    Sleep apnea, unspecified 2018    Low back pain 2016    Osteoarthritis of spine with

## 2024-04-30 NOTE — PROGRESS NOTES
Physical Therapy Rehab Treatment Note  Facility/Department: [facility] [place] [facility]  Room: R248R248-01       NAME: Will Bravo  : 1960 (63 y.o.)  MRN: [id number]  CODE STATUS: Full Code    Date of Service: 2024       Restrictions:  Restrictions/Precautions: Fall Risk, Weight Bearing  Lower Extremity Weight Bearing Restrictions  Left Lower Extremity Weight Bearing: Weight Bearing As Tolerated  Position Activity Restriction  Other position/activity restrictions: Limit excessive bending, lifting >10lb and twisting based on pain tolerance       SUBJECTIVE:   Subjective: \"I was icing my leg earlier and it actually made it hurt more\"    Pain  Pain: /10 pain in R LBP that extends down and around RLE, pt previously medicated.      OBJECTIVE:         Transfers  Surface: Wheelchair;To bed;To mat;From mat  Additional Factors: Verbal cues;Hand placement cues;Increased time to complete  Device: Walker  Sit to Stand  Assistance Level: Stand by assist  Skilled Clinical Factors: good technique noted, vc's for hand placement initially with good carry over  Stand to Sit  Assistance Level: Stand by assist  Skilled Clinical Factors: reaches back prior to sitting, good eccentric control  Bed To/From Chair  Technique: Stand pivot;Stand step  Assistance Level: Stand by assist;Minimal assistance  Skilled Clinical Factors: wc <-> mat table and wc -> bed, close SBA for safety with SPT but no cues required. increased RLE pain with WB. Kaleb with stand step from wc to bed    Ambulation  Surface: Level surface  Device: Rolling walker  Distance: 4 steps  Activity: Within Unit  Activity Comments: wc follow used for safety, poor WB tolerance through RLE  Additional Factors: Verbal cues;Hand placement cues;Increased time to complete  Assistance Level: Minimal assistance (touching assist)  Gait Deviations: Slow adonis;Decreased step length bilateral;Decreased weight shift right;Narrow base of support           PT Exercises  Functional Mobility

## 2024-04-30 NOTE — CONSULTS
Consult Note    Reason for Consult: Medical management of acute and chronic conditions: HTN, HLD, DM2, spinal stenosis    Requesting Physician:  Charlene Ruff DO    HISTORY OF PRESENT ILLNESS:      Will Casey is a 63 years old male with PMH of DM type II, HTN, HLD, pancreatitis, spinal stenosis, and lumbosacral neuritis, who initially presented to the ED with complaints of lower back pain that radiates to the right buttock and right lower extremity all the way down to the toes.  Patient was admitted to the hospital on 4/18/2024, underwent laminectomy L4-5 microdiscectomy decompensation procedure performed by Dr. Kingston on 4/22/2024, stabilized and was discharged home on 4/23/2024.  At home, patient had increased groin pain and difficulty with ambulation.  He reports falling at home and injuring his left foot. Patient returned back to the ED for evaluation and was admitted to the hospital with diagnosis of paresthesias; intractable back pain; acute postoperative pain after spinal surgery.  On 4/29/2024 patient was discharged/readmitted to the acute rehab for further management.    Patient was seen and evaluated after discussing with the treatment team and reviewing the chart.    Patient was assessed and evaluated in his room, awake, alert, oriented x 4.  Patient reports difficulty ambulating due to pain in his mid and lower back and left lower extremity pain.  Patient denies chest pain, dyspnea, dizziness, or headache.  He also denies fever, chills, abdominal pain, nausea or vomiting.  Bowels and urination have been regular.  No change in appetite    Recent lab results, imaging results, med list and plan of care have been reviewed and discussed with the patient and his wife today.  All questions were answered.    Past Medical History:   Diagnosis Date    Hypertension     Pancreatitis 2011    Type II or unspecified type diabetes mellitus without mention of complication, not stated as uncontrolled        Past  Est, Glom Filt Rate 81.4 >60    Calcium 9.4 8.5 - 9.9 mg/dL   POCT Glucose    Collection Time: 04/30/24  6:09 AM   Result Value Ref Range    POC Glucose 271 (H) 70 - 99 mg/dl    Performed on ACCU-CHEK        Data:    FLUORO FOR SURGICAL PROCEDURES    Result Date: 4/29/2024  EXAMINATION: SPOT FLUOROSCOPIC IMAGES 4/29/2024 11:39 am TECHNIQUE: Fluoroscopy was provided by the radiology department for procedure. Radiologist was not present during examination. FLUOROSCOPY DOSE AND TYPE: Radiation Exposure Index: Kerma mGy, COMPARISON: None HISTORY: ORDERING SYSTEM PROVIDED HISTORY: pain TECHNOLOGIST PROVIDED HISTORY: Reason for exam:->pain What reading provider will be dictating this exam?->CRC Intraprocedural imaging. FINDINGS: Spot intraoperative images are obtained.     Intraprocedural fluoroscopic spot images as above.  See separate procedure report for more information.         Assessment/Plan:    Principal Problem:    Impaired mobility and activities of daily living dt  L4-5 MICRODISECTOMY DECOMPRESSION (Spine Lumbar)    Active Problems:    Diabetes mellitus type 2, insulin dependent (HCC)    Displacement of lumbar intervertebral disc without myelopathy    Class 3 severe obesity due to excess calories with serious comorbidity and body mass index (BMI) of 40.0 to 44.9 in adult (HCC)    Diabetic nephropathy associated with type 2 diabetes mellitus (HCC)    Dyslipidemia    Essential hypertension    Reflux esophagitis    Situational depression    Acute post-operative pain    Intractable back pain    Impaired mobility and ADLs    Plan:    Pain management per Dr. Ruff  Correction insulin with goal -180; AC 3 times daily and nightly  Lantus 30 units at night  Continue/hold home medications as ordered after medical history completed  PT/OT  VS per unit routine  Up with assist  Diet regular  Continue monitoring blood counts, liver function, kidney function  With hypoglycemia treatment protocol  Fall

## 2024-04-30 NOTE — FLOWSHEET NOTE
0802:Patient assessment completed. A&OX4. Medication given. He complains of 8/10 back pain. Tylenol given. Patient states pain medication not working. MD notified. LBM 4/30. Continent of bowel and bladder. Patient in bed, call light within reach, and bed alarm on.    1625: Patient blood glucose was 349. Hospitalist notified. 8 units of humalog given.

## 2024-04-30 NOTE — H&P
rehabilitation options with the rehabilitation team including the rehab RN and the admission team as well as the patient.  I feel that the patient's functional recovery would be best served at an acute inpatient rehabilitation program because the patient needs intense therapy three hours per day, direct RN supervision and daily monitoring by a physician for medical status. This cannot be sufficiently provided by home health care, a skilled nursing facility or in an outpatient setting.  I further feel that the patient has the potential to improve functional abilities in an acute intensive rehabilitation program.    Old records were reviewed and summarized.    2.  Other diagnoses which complicate rehabilitation stay include:     Principal Problem:    Impaired mobility and activities of daily living dt  L4-5 MICRODISECTOMY DECOMPRESSION (Spine Lumbar)  Active Problems:    Diabetes mellitus type 2, insulin dependent -Continue blood sugar checks every shift, diet, add diabetic add dietary education, restrict carbohydrates to lowest effective and safe carb count per meal advising 4 carbs per meal, add at bedtime snack to prevent a.m. hypoglycemia, adjust/add medications.  Consider dietary consult    Displacement of lumbar intervertebral disc without myelopathy      Metabolic syndrome, Class 3 severe obesity due to excess calories with serious comorbidity and body mass index (BMI) of 40.0 to 44.9 in adult -low-carb diet monitor for sleep apnea    Chronic pain of right knee-consider knee injection    Cirrhosis of liver without ascites -low-carb diet    Diabetic nephropathy associated with type 2 diabetes mellitus (HCC)    Dyslipidemia, Essential hypertension-Acute rehab to monitor heart rate and rhythm with the option of telemetry and the effects of chronotropic medication with respect to increasing physical activity and exercise in PT, OT, ADLs with medication titration to lowest effective dosing.  Continue blood signs  every shift focusing on heart rate, rhythm and blood pressure checks with orthostatic checks-monitoring the effect of exercise, therapy and posture.  Consult hospitalist for backup medical and adjust/add medications.  Monitor heart rate and blood pressure as well as medications effects on vital signs before during and after therapy with especial focus on preventing orthostasis and falls risk.    Reflux esophagitis-Elevate head of bed after meals, monitor stools for blood, lowest effective dose of PPI, consider Tums.    Situational depression-emotional support provided daily, vitamin B12, encourage participation in rehabilitation support group and recreational therapy, adjust/add medications.    Acute post-operative pain, Intractable back pain, Left foot pain-x-ray left ankle and foot stat-was negative for fracture add Voltaren gel elevation Ace wrap as needed-discussed with pain management status post left SI injection seem to have helped temporarily early.  Lowest effective dose pain medications check nocturnal pulse ox to assess for sleep apnea.  High risk sleep apnea/nocturnal hypoxemia-check nocturnal pulse ox         I am especially concerned about their recent medical complexities.    The patient's high risk medication use includes opiates for pain.    The patient is high risk for urinary tract infection, an admission urinalysis has been ordered.  I will have the nurses check post void residual bladder volumes and place acatheter if excessive urine is retained in the bladder after voiding.     The patient is risk for deep venous thromosis, complex deep venous thrombosis protocol prophylaxis has been ordered.    The patient is high risk for orthostasis and a hydration program and orthostatic blood pressure screening have been ordered.    I will attempt to get old records from the patient's previous hospital stay.  Care everywhere on [employer] was utilized.    3.  Current and previous medications were reviewed and

## 2024-04-30 NOTE — PROGRESS NOTES
Physical Therapy Rehab Treatment Note  Facility/Department: [facility] [place] [facility]  Room: R2/R248-01       NAME: Will Bravo  : 1960 (63 y.o.)  MRN: [id number]  CODE STATUS: Full Code    Date of Service: 2024       Restrictions:  Restrictions/Precautions: Fall Risk, Weight Bearing  Lower Extremity Weight Bearing Restrictions  Left Lower Extremity Weight Bearing: Weight Bearing As Tolerated  Position Activity Restriction  Other position/activity restrictions: Limit excessive bending, lifting >10lb and twisting based on pain tolerance       SUBJECTIVE:   Subjective: \"How much more physical therapy do I have today?\"    Pain  Pain: 10/10 pain in R LBP that extends down and around RLE, pt previously medicated.      OBJECTIVE:         Scooting  Assistance Level: Stand by assist  Skilled Clinical Factors: to EOB and along EOB    Transfers  Surface: To bed;From bed  Additional Factors: Verbal cues;Hand placement cues;Increased time to complete  Device: Walker  Sit to Stand  Assistance Level: Stand by assist  Skilled Clinical Factors: good technique noted, vc's for hand placement initially with good carry over  Stand to Sit  Assistance Level: Stand by assist  Skilled Clinical Factors: reaches back prior to sitting, good eccentric control              PT Exercises  A/AROM Exercises: seated: AP, LAQ, march [id number] musa  Static Standing Balance Exercises: stand at ww x5s - heavy UE bracing, decreased WB through RLE  Breathing Techniques: cues for deep breathing when experiencing increased pain throughout seated therex      Activity Tolerance  Activity Tolerance: Patient limited by pain       ASSESSMENT/PROGRESS TOWARDS GOALS:   Assessment  Assessment: Pt very limited by pain this AM, required frequent rest breaks as well as cuing to improve breathing technique when he's experiencing increased pain during therex. Also demonstrated poor standing tolerance, minimal WB noted through RLE and was unable to decreased WB through  BUEs despite cuing from therapist.  Activity Tolerance: Patient limited by pain    Goals:  Long Term Goals  Long Term Goal 1: Pt will demonstrate bed mobility indep  Long Term Goal 2: Pt will demonstrate transfers indep with safest AD  Long Term Goal 3: Pt will demonstrate amb >/= 150ft indep with safest AD with proper gait mechanics  Long Term Goal 4: Pt will demonstrate stair negotiation 3 steps with 1 rail supervision  Patient Goals   Patient Goals : decrease pain    PLAN OF CARE/Safety:   Safety Devices  Type of Devices: All fall risk precautions in place;Bed alarm in place;Left in bed;Call light within reach      Therapy Time:   Individual   Time In 1147   Time Out 1200   Minutes 13    Missed 17 minutes of scheduled therapy session due to pt at x-ray.     Minutes: 13  Transfer/Bed mobility trainin  Gait trainin  Neuro re education: 0  Therapeutic ex: Nora GODINEZ PTA, 24 at 12:24 PM

## 2024-04-30 NOTE — PLAN OF CARE
Problem: Safety - Adult  Goal: Free from fall injury  4/30/2024 1040 by Citlali Barrow, RN  Outcome: Progressing  4/30/2024 0042 by Elizabeth Yee, RN  Outcome: Progressing

## 2024-04-30 NOTE — PROGRESS NOTES
OCCUPATIONAL THERAPY  INPATIENT REHAB TREATMENT NOTE  [facility]      NAME: Will Bravo  : 1960 (63 y.o.)  MRN: [id number]  CODE STATUS: Full Code  Room: R248/R248-01    Date of Service: 2024    Referring Physician: Dr Ruff  Rehab Diagnosis: Impaired mobility and ADL's due to severe lumbar stenosis    Hospital course:   Comments: Pt had L4-L5 microdiscectomy decompression on 2024 under the service of Dr. Kingston. Pain in his back has gradually worsening. Pain goes from rt groin down the rt leg. Pt wanting to be admitted to rehab for therapy due topain numbness and weakness down the right leg to the foot and difficulty walking . He has not worked with physical therapy secondary to pain.      Restrictions  Restrictions/Precautions  Restrictions/Precautions: Fall Risk            Position Activity Restriction  Other position/activity restrictions: Limit excessive bending, lifting >10lb and twisting based on pain tolerance    Patient's date of birth confirmed: Yes    SAFETY:  Safety Devices  Safety Devices in place: Yes  Type of devices: All fall risk precautions in place    SUBJECTIVE:  Subjective  Subjective: \"It's been like that for a few hours\"    Pain at start of treatment: Yes: 10/10    Pain at end of treatment: Yes: 7/10    Location: starts mid buttocks and radiates around to the front of the groin and down his right leg. Pain in the lower leg is higher on the lateral aspect.   Description stabbing  Nursing notified: Yes  RN: Citlali  Intervention: Cold applied    COGNITION:     WFL for the session although some impulsivity was observed when patient got pain including quick low transfer to the bed at the end of the session and randomly standing up from the chair for position change    OBJECTIVE:     Patient was initially resistant to coming to therapy at this time. Discussed with patient that he would benefit from position change and he moved to the wheelchair with supervision but

## 2024-04-30 NOTE — PROGRESS NOTES
Physical Therapy Rehab Treatment Note  Facility/Department: [facility] [place] [facility]  Room: R248/R248-01       NAME: Will Bravo  : 1960 (63 y.o.)  MRN: [id number]  CODE STATUS: Full Code    Date of Service: 2024       Restrictions:  Restrictions/Precautions: Fall Risk, Weight Bearing  Lower Extremity Weight Bearing Restrictions  Left Lower Extremity Weight Bearing: Weight Bearing As Tolerated  Position Activity Restriction  Other position/activity restrictions: Limit excessive bending, lifting >10lb and twisting based on pain tolerance       SUBJECTIVE:   Subjective: \"I have had a rough day\"    Pain  Pain: 8/10 pain in R LBP that extends down and around RLE, pt previously medicated.      OBJECTIVE:   Transfers  Surface: From bed;Wheelchair  Additional Factors: Verbal cues;Hand placement cues;Increased time to complete  Sit to Stand  Assistance Level: Stand by assist  Skilled Clinical Factors: good technique noted, vc's for hand placement initially with good carry over  Stand to Sit  Assistance Level: Stand by assist  Skilled Clinical Factors: reaches back prior to sitting, good eccentric control  Stand Pivot  Assistance Level: Stand by assist  Skilled Clinical Factors: bed to wc, pain limited throughout with increased time and effort to complete    PT Exercises  PROM Exercises: seated HS/gastroc stretch x3 30 sec hold BLE  A/AROM Exercises: seated: AP, LAQ, march [id number] musa  Breathing Techniques: cues for deep breathing when experiencing increased pain throughout seated therex     Activity Tolerance  Activity Tolerance: Patient limited by pain    Education Provided: Role of Therapy;Transfer Training;Energy Conservation;Precautions;Mobility Training  Education  Education Given To: Patient  Education Provided: Role of Therapy;Transfer Training;Energy Conservation;Precautions;Mobility Training  Education Method: Verbal  Barriers to Learning: None  Education Outcome: Verbalized understanding  Skilled Clinical

## 2024-04-30 NOTE — PROGRESS NOTES
Facility/Department: [facility] [place] [facility]  Rehabilitation Initial Assessment: Occupational Therapy  Room: R248/R248-01    NAME: Will Bravo  : 1960  MRN: [id number]    Date of Service: 2024    Rehab Diagnosis(es): Impaired mobility and ADL's due to severe lumbar stenosis  Patient Active Problem List    Diagnosis Date Noted    Impaired mobility and ADLs 2024    Intractable back pain 2024    Acute post-operative pain 2024    Back spasm 2024    Post-op pain- L4-5 MICRODISECTOMY DECOMPRESSION (Spine Lumbar) 2024    Impaired mobility and activities of daily living dt  L4-5 MICRODISECTOMY DECOMPRESSION (Spine Lumbar) 2024    Poorly controlled diabetes mellitus (HCC) 2024    Right lumbar radiculitis 2024    Myelopathy concurrent with and due to stenosis of lumbar spine (HCC) 2024    Class 3 severe obesity due to excess calories with serious comorbidity and body mass index (BMI) of 40.0 to 44.9 in adult (HCC) 2024    Spinal stenosis of lumbar region with neurogenic claudication 2024    Swelling of upper arm 2024    Strain of lumbar region 2024    History of renal cell cancer 2023    Portal hypertension (HCC) 2023    Obesity, Class III, BMI 40-49.9 (morbid obesity) (HCC) 2023    Situational depression 2022    Stress at home 2022    Hypercalcemia 2022    Hepatitis A immune 2022    Metabolic syndrome 2022    Diabetic nephropathy associated with type 2 diabetes mellitus (HCC) 2022    Albuminuria 2021    Cirrhosis of liver without ascites (HCC) 11/10/2020    Hepatic steatosis 11/10/2020    Chronic pain of right knee 2020    Esophageal disorder 2019    Pure hypercholesterolemia, unspecified 2019    Anemia, unspecified 2018    Sleep apnea, unspecified 2018    Low back pain 2016    Osteoarthritis of spine with radiculopathy, lumbar region 2016    Tear

## 2024-05-01 LAB
GLUCOSE BLD-MCNC: 252 MG/DL (ref 70–99)
GLUCOSE BLD-MCNC: 268 MG/DL (ref 70–99)
GLUCOSE BLD-MCNC: 279 MG/DL (ref 70–99)
GLUCOSE BLD-MCNC: 294 MG/DL (ref 70–99)
PERFORMED ON: ABNORMAL

## 2024-05-01 PROCEDURE — 6370000000 HC RX 637 (ALT 250 FOR IP): Performed by: INTERNAL MEDICINE

## 2024-05-01 PROCEDURE — 97530 THERAPEUTIC ACTIVITIES: CPT

## 2024-05-01 PROCEDURE — 6370000000 HC RX 637 (ALT 250 FOR IP): Performed by: PAIN MEDICINE

## 2024-05-01 PROCEDURE — 1180000000 HC REHAB R&B

## 2024-05-01 PROCEDURE — 6370000000 HC RX 637 (ALT 250 FOR IP): Performed by: PHYSICAL MEDICINE & REHABILITATION

## 2024-05-01 PROCEDURE — 99233 SBSQ HOSP IP/OBS HIGH 50: CPT | Performed by: PHYSICAL MEDICINE & REHABILITATION

## 2024-05-01 PROCEDURE — 6360000002 HC RX W HCPCS: Performed by: INTERNAL MEDICINE

## 2024-05-01 PROCEDURE — 99222 1ST HOSP IP/OBS MODERATE 55: CPT | Performed by: INTERNAL MEDICINE

## 2024-05-01 PROCEDURE — 2580000003 HC RX 258: Performed by: INTERNAL MEDICINE

## 2024-05-01 PROCEDURE — 97535 SELF CARE MNGMENT TRAINING: CPT

## 2024-05-01 PROCEDURE — 6370000000 HC RX 637 (ALT 250 FOR IP): Performed by: FAMILY MEDICINE

## 2024-05-01 PROCEDURE — 97110 THERAPEUTIC EXERCISES: CPT

## 2024-05-01 RX ORDER — PREDNISONE 10 MG/1
10 TABLET ORAL DAILY
Status: DISCONTINUED | OUTPATIENT
Start: 2024-05-01 | End: 2024-05-02

## 2024-05-01 RX ORDER — OXYCODONE HYDROCHLORIDE 5 MG/1
5 TABLET ORAL ONCE
Status: COMPLETED | OUTPATIENT
Start: 2024-05-01 | End: 2024-05-01

## 2024-05-01 RX ORDER — INSULIN GLARGINE 100 [IU]/ML
50 INJECTION, SOLUTION SUBCUTANEOUS NIGHTLY
Status: DISCONTINUED | OUTPATIENT
Start: 2024-05-01 | End: 2024-05-04 | Stop reason: HOSPADM

## 2024-05-01 RX ORDER — INSULIN LISPRO 100 [IU]/ML
10 INJECTION, SOLUTION INTRAVENOUS; SUBCUTANEOUS
Status: DISCONTINUED | OUTPATIENT
Start: 2024-05-01 | End: 2024-05-02

## 2024-05-01 RX ADMIN — MENTHOL AND METHYL SALICYLATE: 7.6; 29 OINTMENT TOPICAL at 12:01

## 2024-05-01 RX ADMIN — Medication 100 MG: at 08:36

## 2024-05-01 RX ADMIN — PREDNISONE 10 MG: 10 TABLET ORAL at 13:12

## 2024-05-01 RX ADMIN — HYDROCHLOROTHIAZIDE 25 MG: 25 TABLET ORAL at 05:56

## 2024-05-01 RX ADMIN — BISACODYL 5 MG: 5 TABLET, COATED ORAL at 08:37

## 2024-05-01 RX ADMIN — INSULIN LISPRO 4 UNITS: 100 INJECTION, SOLUTION INTRAVENOUS; SUBCUTANEOUS at 08:37

## 2024-05-01 RX ADMIN — Medication 2000 UNITS: at 16:56

## 2024-05-01 RX ADMIN — INSULIN LISPRO 4 UNITS: 100 INJECTION, SOLUTION INTRAVENOUS; SUBCUTANEOUS at 17:09

## 2024-05-01 RX ADMIN — GABAPENTIN 600 MG: 300 CAPSULE ORAL at 16:56

## 2024-05-01 RX ADMIN — MENTHOL AND METHYL SALICYLATE: 7.6; 29 OINTMENT TOPICAL at 21:54

## 2024-05-01 RX ADMIN — CARVEDILOL 6.25 MG: 6.25 TABLET, FILM COATED ORAL at 21:44

## 2024-05-01 RX ADMIN — GABAPENTIN 600 MG: 300 CAPSULE ORAL at 08:36

## 2024-05-01 RX ADMIN — ATORVASTATIN CALCIUM 20 MG: 20 TABLET, FILM COATED ORAL at 21:44

## 2024-05-01 RX ADMIN — ENOXAPARIN SODIUM 30 MG: 100 INJECTION SUBCUTANEOUS at 21:44

## 2024-05-01 RX ADMIN — ENOXAPARIN SODIUM 30 MG: 100 INJECTION SUBCUTANEOUS at 08:00

## 2024-05-01 RX ADMIN — LOSARTAN POTASSIUM 100 MG: 50 TABLET, FILM COATED ORAL at 08:36

## 2024-05-01 RX ADMIN — OXYCODONE HYDROCHLORIDE 10 MG: 10 TABLET, FILM COATED, EXTENDED RELEASE ORAL at 00:00

## 2024-05-01 RX ADMIN — Medication 10 ML: at 08:42

## 2024-05-01 RX ADMIN — GABAPENTIN 600 MG: 300 CAPSULE ORAL at 21:44

## 2024-05-01 RX ADMIN — CYCLOBENZAPRINE 10 MG: 10 TABLET, FILM COATED ORAL at 01:31

## 2024-05-01 RX ADMIN — FENOFIBRATE 54 MG: 54 TABLET ORAL at 08:35

## 2024-05-01 RX ADMIN — CARVEDILOL 6.25 MG: 6.25 TABLET, FILM COATED ORAL at 08:36

## 2024-05-01 RX ADMIN — OXYCODONE HYDROCHLORIDE 10 MG: 10 TABLET, FILM COATED, EXTENDED RELEASE ORAL at 05:56

## 2024-05-01 RX ADMIN — INSULIN LISPRO 10 UNITS: 100 INJECTION, SOLUTION INTRAVENOUS; SUBCUTANEOUS at 17:09

## 2024-05-01 RX ADMIN — INSULIN LISPRO 4 UNITS: 100 INJECTION, SOLUTION INTRAVENOUS; SUBCUTANEOUS at 12:03

## 2024-05-01 RX ADMIN — INSULIN GLARGINE 50 UNITS: 100 INJECTION, SOLUTION SUBCUTANEOUS at 21:43

## 2024-05-01 RX ADMIN — CYCLOBENZAPRINE 10 MG: 10 TABLET, FILM COATED ORAL at 08:36

## 2024-05-01 RX ADMIN — OXYCODONE 5 MG: 5 TABLET ORAL at 05:56

## 2024-05-01 RX ADMIN — OXYCODONE HYDROCHLORIDE 10 MG: 10 TABLET, FILM COATED, EXTENDED RELEASE ORAL at 12:00

## 2024-05-01 RX ADMIN — CYCLOBENZAPRINE 10 MG: 10 TABLET, FILM COATED ORAL at 16:56

## 2024-05-01 RX ADMIN — Medication 5 MG: at 21:44

## 2024-05-01 RX ADMIN — PANTOPRAZOLE SODIUM 40 MG: 40 TABLET, DELAYED RELEASE ORAL at 05:56

## 2024-05-01 RX ADMIN — ACETAMINOPHEN 650 MG: 325 TABLET ORAL at 08:36

## 2024-05-01 RX ADMIN — AMLODIPINE BESYLATE 10 MG: 10 TABLET ORAL at 08:37

## 2024-05-01 RX ADMIN — OXYMETAZOLINE HCL 2 SPRAY: 0.05 SPRAY NASAL at 09:37

## 2024-05-01 RX ADMIN — OXYCODONE HYDROCHLORIDE 10 MG: 10 TABLET, FILM COATED, EXTENDED RELEASE ORAL at 18:14

## 2024-05-01 RX ADMIN — MENTHOL AND METHYL SALICYLATE: 7.6; 29 OINTMENT TOPICAL at 09:35

## 2024-05-01 RX ADMIN — GABAPENTIN 600 MG: 300 CAPSULE ORAL at 12:00

## 2024-05-01 ASSESSMENT — PAIN SCALES - GENERAL
PAINLEVEL_OUTOF10: 8
PAINLEVEL_OUTOF10: 10
PAINLEVEL_OUTOF10: 6
PAINLEVEL_OUTOF10: 10
PAINLEVEL_OUTOF10: 5
PAINLEVEL_OUTOF10: 7
PAINLEVEL_OUTOF10: 8
PAINLEVEL_OUTOF10: 8
PAINLEVEL_OUTOF10: 9
PAINLEVEL_OUTOF10: 9
PAINLEVEL_OUTOF10: 7

## 2024-05-01 ASSESSMENT — PAIN DESCRIPTION - ORIENTATION
ORIENTATION: MID
ORIENTATION: MID;LOWER
ORIENTATION: MID
ORIENTATION: MID;LOWER
ORIENTATION: RIGHT

## 2024-05-01 ASSESSMENT — PAIN DESCRIPTION - LOCATION
LOCATION: BACK
LOCATION: LEG
LOCATION: BACK

## 2024-05-01 ASSESSMENT — PAIN DESCRIPTION - DESCRIPTORS
DESCRIPTORS: STABBING;THROBBING
DESCRIPTORS: ACHING;THROBBING;STABBING
DESCRIPTORS: ACHING;THROBBING;STABBING
DESCRIPTORS: STABBING;THROBBING
DESCRIPTORS: STABBING;THROBBING

## 2024-05-01 NOTE — PROGRESS NOTES
OCCUPATIONAL THERAPY  INPATIENT REHAB TREATMENT NOTE  [facility]      NAME: Will Bravo  : 1960 (63 y.o.)  MRN: [id number]  CODE STATUS: Full Code  Room: R248/R248-01    Date of Service: 2024    Referring Physician: Dr Ruff  Rehab Diagnosis: Impaired mobility and ADL's due to severe lumbar stenosis    Hospital course:   Comments: Pt had L4-L5 microdiscectomy decompression on 2024 under the service of Dr. Kingston. Pain in his back has gradually worsening. Pain goes from rt groin down the rt leg. Pt wanting to be admitted to rehab for therapy due topain numbness and weakness down the right leg to the foot and difficulty walking . He has not worked with physical therapy secondary to pain.      Restrictions  Restrictions/Precautions  Restrictions/Precautions: Fall Risk, Weight Bearing   Lower Extremity Weight Bearing Restrictions  Left Lower Extremity Weight Bearing: Weight Bearing As Tolerated        Position Activity Restriction  Other position/activity restrictions: Limit excessive bending, lifting >10lb and twisting based on pain tolerance    Patient's date of birth confirmed: Yes    SAFETY:  Safety Devices  Type of devices: All fall risk precautions in place    SUBJECTIVE:  \"This is not a good day\"     Pain at start of treatment: Yes: 9/10    Pain at end of treatment: Yes: 9/10    Location: R hip/buttocks  Description sharp  Nursing notified: Declined    Intervention: Cold applied    Covered ice pack provided to patient- applied intermittently to R hip area with mild relief noted. No signs of adverse reactions noted    OBJECTIVE:    Pt scheduled for ADL but declining this AM    Pt agreeable to initially head to gym. However once in gym pt really in too much pain to remain seated in w/c. Pt needing sit on the edge of the mat table and bring his R leg onto table for relief. Pt reports this is the only way he can control his pain. Pt needing to transfer to table from w/c 3 times for

## 2024-05-01 NOTE — PROGRESS NOTES
Comprehensive Nutrition Assessment    Type and Reason for Visit:  Initial, Consult    Nutrition Recommendations/Plan:   Modify Current Diet (Carb Control 5 KALEN)     Malnutrition Assessment:  Malnutrition Status:  No malnutrition (05/01/24 1500)      Nutrition Assessment:    Pt presents with reported good appetite/intake pta and currently, with no nutritional complaints. To modify diet to Carb Control to promote glucose/weight control and KALEN d/t edema present, diuretic tx.    Nutrition Related Findings:    PMH-T2DM, htn; admitted s/p 4/2- right bilateral L4-5 microdecompression discectomy. Labs/meds reviewed. Gluc-[id number] x last 48hrs. Pertinenet meds-lipitor, tricor, HCTZ, insulin, ppi. Trace BLE edema noted. Wound Type: Surgical Incision       Current Nutrition Intake & Therapies:    Average Meal Intake: %     ADULT DIET; Regular    Anthropometric Measures:  Height: 177.8 cm (5' 10\")  Ideal Body Weight (IBW): 166 lbs (75 kg)    Admission Body Weight: 127 kg (280 lb) (stated)  Current BMI (kg/m2):  40.2  Usual Body Weight: 125.6 kg (277 lb) (10/2023 [facility]; 265lb (7/2020))                       BMI Categories: Obese Class 3 (BMI 40.0 or greater)    Estimated Daily Nutrient Needs:  Energy Requirements Based On: Kcal/kg  Weight Used for Energy Requirements: Current  Energy (kcal/day): 1728-9484 ( kg x 15-16)  Weight Used for Protein Requirements: Ideal  Protein (g/day): ~90 (kg x 1.2)  Method Used for Fluid Requirements: ml/Kg  Fluid (ml/day): ~2300 (kg IBW x 30)    Nutrition Diagnosis:   Altered nutrition-related lab values related to endocrine dysfuntion as evidenced by lab values  Overweight/Obese related to excessive energy intake as evidenced by BMI    Nutrition Interventions:   Food and/or Nutrient Delivery: Modify Current Diet (Carb Control 5 KALEN)  Nutrition Education/Counseling:  (To address need/interest for diabetic diet educ prior to d/c)  Coordination of Nutrition Care: Continue to monitor while

## 2024-05-01 NOTE — PLAN OF CARE
Problem: Safety - Adult  Goal: Free from fall injury  4/30/2024 2303 by Celi Mccray RN  Outcome: Progressing  4/30/2024 2302 by Celi Mccray RN  Outcome: Progressing  4/30/2024 1040 by Citlali Barrow RN  Outcome: Progressing     Problem: Discharge Planning  Goal: Discharge to home or other facility with appropriate resources  4/30/2024 2303 by Celi Mccray RN  Outcome: Progressing  4/30/2024 2302 by Celi Mccray RN  Outcome: Progressing     Problem: Pain  Goal: Verbalizes/displays adequate comfort level or baseline comfort level  4/30/2024 2303 by Celi Mccray RN  Outcome: Progressing  4/30/2024 2302 by Celi Mccray RN  Outcome: Progressing  Flowsheets (Taken 4/30/2024 2146)  Verbalizes/displays adequate comfort level or baseline comfort level:   Encourage patient to monitor pain and request assistance   Assess pain using appropriate pain scale   Administer analgesics based on type and severity of pain and evaluate response

## 2024-05-01 NOTE — PROGRESS NOTES
Patient slowly progressing on the rehabilitation floor.  This morning able to sit at the side of the bed fairly comfortably.  Significant pain whenever he tries to bear weight mainly in the right posterior buttock and thigh.  4/28/2024 x-ray pelvis reviewed.  Both hip joints appear to be intact.  Minimal SI joint degenerative changes.  Significant pain with palpation right posterior bursa and SI joint.  Significant increase in pain with any movement or weightbearing in this area.  Continue physical therapy pain management rehabilitation  Discussed with patient questions answered  4/22/2024 right bilateral L4-5 microdecompression discectomy  Impression  Right sacroiliitis and hip trochanteric bursitis.  Lumbar stenosis with right radiculopathy  CRISTINO MORA MD

## 2024-05-01 NOTE — PROGRESS NOTES
Physical Therapy Rehab Treatment Note  Facility/Department: [facility]  [facility]       NAME: Will Bravo  : 1960 (63 y.o.)  MRN: [id number]  CODE STATUS: Full Code    Date of Service: 2024       Restrictions:  Restrictions/Precautions: Fall Risk, Weight Bearing  Lower Extremity Weight Bearing Restrictions  Left Lower Extremity Weight Bearing: Weight Bearing As Tolerated  Position Activity Restriction  Other position/activity restrictions: Limit excessive bending, lifting >10lb and twisting based on pain tolerance       SUBJECTIVE:   Subjective: \"I didn't sleep a wink last night\"    Pain  Pain: 8/10 pain in R LBP that extends down and around RLE, notified nursing who reports pt is not yet due for any more pain medication.      OBJECTIVE:         Bed Mobility  Additional Factors: Verbal cues;Increased time to complete;Head of bed flat;With handrails  Roll Left  Assistance Level: Stand by assist  Skilled Clinical Factors: required use of bedrail, completed x2  Roll Right  Assistance Level: Stand by assist  Skilled Clinical Factors: increased time and effort to achieve full roll due to RLE pain, required use of bedrail  Sit to Supine  Assistance Level: Stand by assist  Skilled Clinical Factors: increased time and effort with BLEs, pain with getting BLEs into bed  Supine to Sit  Assistance Level: Stand by assist  Skilled Clinical Factors: increased time and effort, required use of bedrail  Scooting  Assistance Level: Stand by assist  Skilled Clinical Factors: to EOB and along EOB    Transfers  Additional Factors:  (NT - pt declines due to increased pain, \"I can't put any weight through this leg right now\")    Ambulation  Activity Comments: NT - pt declines due to increased pain, \"I can't put any weight through this leg right now\"           PT Exercises  A/AROM Exercises: seated: AP, LAQ z40-24om musa; march x5ea musa - unable to continue due to RLE pain; hip abd/add x7 - increased R hip  pain  Breathing Techniques: cues for deep breathing when experiencing increased pain throughout seated therex and bed mobility  Exercise Equipment: CP applied to R hip/thigh intermittently throughout session      Activity Tolerance  Activity Tolerance: Patient limited by pain           ASSESSMENT/PROGRESS TOWARDS GOALS:   Assessment  Assessment: Pt continues to be pain limited this session, unable to progress any OOB activity due to pt feeling like he cannot tolerate WB through his RLE at this time. Focused on LE strengthening and bed mobility as tolerated, pt continues to experience increased pain and requests more pain medication. Educated pt that after speaking to nursing, he is on a set schedule for pain medication and has no PRN meds ordered due to the high dosage of medication he is recieving.  Activity Tolerance: Patient limited by pain    Goals:  Long Term Goals  Long Term Goal 1: Pt will demonstrate bed mobility indep  Long Term Goal 2: Pt will demonstrate transfers indep with safest AD  Long Term Goal 3: Pt will demonstrate amb >/= 150ft indep with safest AD with proper gait mechanics  Long Term Goal 4: Pt will demonstrate stair negotiation 3 steps with 1 rail supervision  Patient Goals   Patient Goals : decrease pain    PLAN OF CARE/Safety:   Safety Devices  Type of Devices: All fall risk precautions in place;Left in bed;Call light within reach      Therapy Time:   Individual   Time In 1130   Time Out 1200   Minutes 30      Minutes: 30  Transfer/Bed mobility training: 15  Gait trainin  Neuro re education:0  Therapeutic ex: 15      SHAN GODINEZ PTA, 24 at 12:16 PM

## 2024-05-01 NOTE — CARE COORDINATION
[facility]  INPATIENT REHABILITATION  TEAM CONFERENCE NOTE  Room: R248/R248-01  Admit Date: 2024       Date: 2024  Patient Name: Will Bravo        MRN: [id number]    : 1960  (63 y.o.)  Gender: male        REHAB DIAGNOSIS:   Diagnosis: Impaired mobility and activities of daily living dt L4-5 MICRODISECTOMY DECOMPRESSION (Spine Lumbar)    CO MORBIDITIES:      Past Medical History:   Diagnosis Date    Hypertension     Pancreatitis     Type II or unspecified type diabetes mellitus without mention of complication, not stated as uncontrolled      Past Surgical History:   Procedure Laterality Date    CT CRYO ABLATION RENAL TUMOR  2019    CT CRYO ABLATION RENAL TUMOR    KNEE ARTHROSCOPY Left     [facility]    LAMINECTOMY N/A 2024    L4-5 MICRODISECTOMY DECOMPRESSION performed by Alda Kingston MD at [facility] OR    PAIN MANAGEMENT PROCEDURE Right 2024    SI Joint injection vs Transforaminal Epidural steroid injection.Right side under fluroscopy performed by Jorge Tyler MD at [facility] OR    SKIN GRAFT Right     [facility] LEG    SPINE SURGERY  2015    LUMBAR    TONSILLECTOMY  1972        Restrictions  Restrictions/Precautions: Fall Risk, Weight Bearing  Lower Extremity Weight Bearing Restrictions  Left Lower Extremity Weight Bearing: Weight Bearing As Tolerated  Position Activity Restriction  Other position/activity restrictions: Limit excessive bending, lifting >10lb and twisting based on pain tolerance  CASE MANAGEMENT    Social/Functional History  Social/Functional History  Lives With: Spouse  Type of Home: House  Home Layout: One level  Home Access: Stairs to enter with rails  Entrance Stairs - Number of Steps: 3  Entrance Stairs - Rails: Left  Bathroom Shower/Tub: Walk-in shower  Bathroom Toilet: Handicap height  Bathroom Equipment: Grab bars in shower  Bathroom Accessibility: Walker accessible  Home Equipment: Cane, Walker, rolling  Has the patient had two or  Limits (04/30/24 1101)  Orientation Level: Oriented X4 (04/30/24 1101)  SP:              RECREATIONAL THERAPY  Attendance to recreational therapy programs:    []  Pet Therapy  [] Music Therapy  [] Art Therapy    [] Recreation Therapy Group [] Support Group           Patient social interaction (mood, participation): good    Patient strengths: motivated, good family support    Patients goal: to go home    Problems/Barriers: pain, missed some minutes, cueing, increase time to complete tasks        1. Safety:          - Intervention / Plan:    [x]  falls protocol     [x]  PT/OT    []  SP        - Results:         2. Potential DME needs:         - Intervention / Plan:  [x]  PT/OT     [x]  Assess equipment needs/access       - Results:         3.  Weakness:          - Intervention / Plan:  [x]  PT/OT      []  Other:         - Results:         4.  Discharge planning needs:          - Intervention / Plan:  [x]  Weekly team conference      [x]  family training        - Results:         5.            - Intervention / Plan:          - Results:         6.            - Intervention / Plan:         - Results:         7.            - Intervention / Plan:         - Results:           Discharge Plan   Estimated Length of Stay: 12    Tentative Discharge date: 5/7/24      Anticipated Discharge Destination:  Home      Team recommendations:    1. Follow up Therapy :    PT  OT  RN  HH Aide    2. Home Health    Other:     Equipment needed at Discharge: W/C      Team Members Present at Conference:    Physician: Dr. Charlene Ruff  : Laura Lynn, MSW, LSW  RN: Herbie Billy RN  Physical Therapist: Roxann Olivares, PT  Occupational Therapist: Dominik Clement, OTR  Speech Therapist: Tiera Olvera, SLP  Other: Amber Serrato RN/CM      Electronically signed by Amber Serrato RN on 5/2/2024 at 10:55 AM

## 2024-05-01 NOTE — PLAN OF CARE
Problem: Discharge Planning  Goal: Discharge to home or other facility with appropriate resources  Outcome: Progressing     Problem: Pain  Goal: Verbalizes/displays adequate comfort level or baseline comfort level  Outcome: Progressing  Flowsheets (Taken 4/30/2024 2146)  Verbalizes/displays adequate comfort level or baseline comfort level:   Encourage patient to monitor pain and request assistance   Assess pain using appropriate pain scale   Administer analgesics based on type and severity of pain and evaluate response     Problem: Physical Therapy - Adult  Goal: By Discharge: Performs mobility at highest level of function for planned discharge setting.  See evaluation for individualized goals.  4/30/2024 1302 by Danielle Winter, PT  Outcome: Progressing

## 2024-05-01 NOTE — PROGRESS NOTES
OCCUPATIONAL THERAPY  INPATIENT REHAB TREATMENT NOTE  [facility]      NAME: Will Bravo  : 1960 (63 y.o.)  MRN: [id number]  CODE STATUS: Full Code  Room: R248/R248-01    Date of Service: 2024    Referring Physician: Dr Ruff  Rehab Diagnosis: Impaired mobility and ADL's due to severe lumbar stenosis    Hospital course:   Comments: Pt had L4-L5 microdiscectomy decompression on 2024 under the service of Dr. Kingston. Pain in his back has gradually worsening. Pain goes from rt groin down the rt leg. Pt wanting to be admitted to rehab for therapy due topain numbness and weakness down the right leg to the foot and difficulty walking . He has not worked with physical therapy secondary to pain.      Restrictions  Restrictions/Precautions  Restrictions/Precautions: Fall Risk, Weight Bearing   Lower Extremity Weight Bearing Restrictions  Left Lower Extremity Weight Bearing: Weight Bearing As Tolerated        Position Activity Restriction  Other position/activity restrictions: Limit excessive bending, lifting >10lb and twisting based on pain tolerance    Patient's date of birth confirmed: Yes    SAFETY:  Safety Devices  Type of devices: All fall risk precautions in place    SUBJECTIVE:       Pain at start of treatment: Yes: 10/10    Pain at end of treatment: Yes: 10/10    Location: back/buttocks- R side   Description sharp  Nursing notified: Declined  Pt medicated at specific times- not yet due     Ice provided per pt request    OBJECTIVE:    Pt initially agreeable to in room treatment however visibly in pain. Pt only able to tolerate 2 exercises before becoming overwhelmed by pain. Pt presented sitting EOB and this OT attempted to get him comfortable in supine/sidelying position however this was not comfortable for the patient and he only tolerated for a few minutes. Pt conitnues to report that the only comfortable position for him is sitting EOB and leaning to the L side. Discussed safety  1420         Minutes 20             Therapeutic activities: 20 minutes     Electronically signed by:    Dominik Clement OT,   5/1/2024, 2:27 PM

## 2024-05-01 NOTE — PROGRESS NOTES
Subjective:  The patient complains of severe acute on chronic progressive fatigue and low back pain specifically right SI area partially relieved by rest, medications, ice, PT,  OT,   SLP and rest and exacerbated by recent surgery.      I am concerned about patient’s medical complexities and barriers to advancing in rehab goals including pain intolerance and uncontrolled diabetes.        I reviewed current care and plans for further care with other rehab providers including nursing and case management.  According to recent nursing note, \"  A&OX4. Medication given. He complains of 8/10 back pain. Tylenol given. Patient states pain medication not working. MD notified. LBM 4/30. Continent of bowel and bladder. Patient in bed, call light within reach, and bed alarm on.  1625: Patient blood glucose was 349. Hospitalist notified. 8 units of humalog given \".    I also appreciate neurosurgery's input, \"slowly progressing on the rehabilitation floor.  This morning able to sit at the side of the bed fairly comfortably.  Significant pain whenever he tries to bear weight mainly in the right posterior buttock and thigh.  4/28/2024 x-ray pelvis reviewed.  Both hip joints appear to be intact.  Minimal SI joint degenerative changes.   Significant pain with palpation right posterior bursa and SI joint.  Significant increase in pain with any movement or weightbearing in this area.   Continue physical therapy pain management rehabilitation   Discussed with patient questions answered  4/22/2024 right bilateral L4-5 microdecompression discectomy  Impression  Right sacroiliitis and hip trochanteric bursitis.  Lumbar stenosis with right radiculopathy\".    He is having problems weight baring on his right legs.    ROS x10:  The patient also complains of severely impaired mobility and activities of daily living.  Otherwise no new problems with vision, hearing, nose, mouth, throat, dermal, cardiovascular, GI, , pulmonary, musculoskeletal,  throughout (04/30/24 1145)  Scooting  Assistance Level: Stand by assist (04/30/24 1211)  Skilled Clinical Factors: to EOB and along EOB (04/30/24 1211)  Transfers:  Transfers  Sit to Stand: Stand by assistance (04/30/24 1147)  Stand to Sit: Stand by assistance (04/30/24 1147)  Bed to Chair: Stand by assistance (04/30/24 1147)  Comment: VCs for hand placement and technique with WW (04/30/24 1147)  Transfers  Surface: From bed;Wheelchair (04/30/24 1512)  Additional Factors: Verbal cues;Hand placement cues;Increased time to complete (04/30/24 1512)  Device: Walker (04/30/24 1510)  Sit to Stand  Assistance Level: Stand by assist (04/30/24 1512)  Skilled Clinical Factors: good technique noted, vc's for hand placement initially with good carry over (04/30/24 1512)  Stand to Sit  Assistance Level: Stand by assist (04/30/24 1512)  Skilled Clinical Factors: reaches back prior to sitting, good eccentric control (04/30/24 1512)  Bed To/From Chair  Technique: Stand pivot;Stand step (04/30/24 1510)  Assistance Level: Stand by assist;Minimal assistance (04/30/24 1510)  Skilled Clinical Factors: wc <-> mat table and wc -> bed, close SBA for safety with SPT but no cues required. increased RLE pain with WB. Kaleb with stand step from wc to bed (04/30/24 1510)  Stand Pivot  Assistance Level: Stand by assist (04/30/24 1512)  Skilled Clinical Factors: bed to wc, pain limited throughout with increased time and effort to complete (04/30/24 1512)  Gait:   Ambulation  Surface: Level tile (04/30/24 1147)  Device: Rolling Walker (04/30/24 1147)  Assistance: Stand by assistance (04/30/24 1147)  Quality of Gait: narrow TYRA, antalgic gait, mild unsteadiness, overall flexed posture (04/30/24 1147)  Distance: 5 steps to w/c with turn (04/30/24 1147)  Comments: Pt reports some unsteadiness in LEs during ambulation. No buckling. VCs required for WW management and technique with turn (04/30/24 4646)  Ambulation  Surface: Level surface (04/30/24

## 2024-05-01 NOTE — FLOWSHEET NOTE
0800: Patient assessments completed, patient alert and oriented times 4, patient continues to complain of uncontrolled pain of 10/10, Dr Tyler notified and will see patient.   1200: Patient seen by Dr Tyler, no change to pain medications at this time. Patient educations provided. IV access removed from patient left antecubital, pressure applied, no bleeding at this time. Patient complied with therapies.  1600: Patient spouse at bedside, request to see MD in regards to patient uncontrolled pain, MD notified, NP notified, Nursing supervisor notified, NP and supervisor in to see spouse and patient, education provided. Dr Deng in to see patient , insulin dosages adjusted, see MAR, patient aware, and agrees with plan of diabetic care.   1830: Patient resting in bed at this time. Call light in reach. .Electronically signed by Melissa Lauren RN on 5/1/2024 at 6:31 PM

## 2024-05-01 NOTE — PROGRESS NOTES
Physical Therapy Rehab Treatment Note  Facility/Department: [facility] [place] REHAB  Room: R248/R248-01       NAME: Will Bravo  : 1960 (63 y.o.)  MRN: [id number]  CODE STATUS: Full Code    Date of Service: 2024       Restrictions:  Restrictions/Precautions: Fall Risk, Weight Bearing  Lower Extremity Weight Bearing Restrictions  Left Lower Extremity Weight Bearing: Weight Bearing As Tolerated  Position Activity Restriction  Other position/activity restrictions: Limit excessive bending, lifting >10lb and twisting based on pain tolerance       SUBJECTIVE: \"I don't know if  can keep doing this therapy.\"        Pain  Patient c/o 10/10 R LE pain. RN present to give medication.       OBJECTIVE:   Bed mobility  Rolling to Left: Supervision  Rolling to Right: Supervision  Supine to Sit: Supervision  Sit to Supine: Supervision  Scooting: Supervision  Bed Mobility Comments: HOB flat. Patient significantly guarded. Poor tolerance to supine position. Continued education on log roll technique and positioning for neutral spinal alignment in S/L position.    Transfers  Sit to Stand: Stand by assistance  Stand to Sit: Stand by assistance  Bed to Chair: Minimal assistance  Comment: Patient completes STS x5 from bed using ww. Provided education on abdominal bracing.    Ambulation  Comments: NT patient unable to tolerate.    PT Exercises  Exercise Treatment: seated hamstring stretch 3x30 B, supine piriformis stretch 3x30\"  A/AROM Exercises: seated: AP, LAQ, march x10 ea with abdominal bracing  Breathing Techniques: cues for deep breathing when experiencing increased pain throughout seated therex and bed mobility  Exercise Equipment: CP applied to low back 10 minutes during seated tx.     ASSESSMENT/PROGRESS TOWARDS GOALS:   Body Structures, Functions, Activity Limitations Requiring Skilled Therapeutic Intervention: Decreased functional mobility ;Decreased ROM;Decreased strength;Decreased endurance;Decreased balance;Increased  pain;Decreased body mechanics;Decreased posture  Assessment: Patient's wife present during session. States she brought a tennis ball to massage patient's right hip. Reviewed technique for gentle tennis ball massage for pain relief. Patient and his wife verbalize good understanding. Patient's tolerance to functional mobility continues to be limited by significant pain and drowsiness. Patient reports he has been unable to sleep for several days. Continued education on log rolling, positioning and abdominal bracing to improve mobility tolerance. Patient unable to tolerate >1 minute of static standing this PM.  Therapy Prognosis: Good  Assessment  Discharge Recommendations: Continue to assess pending progress;Therapy recommended at discharge    Goals:  Long Term Goals  Long Term Goal 1: Pt will demonstrate bed mobility indep  Long Term Goal 2: Pt will demonstrate transfers indep with safest AD  Long Term Goal 3: Pt will demonstrate amb >/= 150ft indep with safest AD with proper gait mechanics  Long Term Goal 4: Pt will demonstrate stair negotiation 3 steps with 1 rail supervision  Patient Goals   Patient Goals : decrease pain    PLAN OF CARE/Safety: all safety precautions in place.          Therapy Time:  Therapy Time   Individual   Time In 1310   Time Out 1400   Minutes 50        Individual   Time In 1435   Time Out 1445   Minutes 10   Timed Code Treatment Minutes: 10 Minutes  Minutes:60  Transfer/Bed mobility trainin  Therapeutic ex:20      Tiffany Diamond PTA, 24 at 3:43 PM

## 2024-05-01 NOTE — PROGRESS NOTES
Physical Therapy Missed Treatment   Facility/Department: [facility] MED SURG R248/R248-01    NAME: Will Bravo    : 1960 (63 y.o.)  MRN: [id number]    Account: [id number]  Gender: male    Chart reviewed, attempted PT at 1555. Patient unavailable 2° to:    [x] Pt's family declined - pt sleeping soundly upon arrival. Family present and educated on missed minutes this date. Family verbalizes understanding and politely requests therapist to try another day and to let pt rest at this time d/t 10/10 pain throughout PM.     Will attempt PT treatment again at earliest convenience.      Electronically signed by Nick Baer PTA on 24 at 3:58 PM EDT

## 2024-05-01 NOTE — PLAN OF CARE
Problem: Safety - Adult  Goal: Free from fall injury  Outcome: Progressing     Problem: Discharge Planning  Goal: Discharge to home or other facility with appropriate resources  Outcome: Progressing     Problem: Pain  Goal: Verbalizes/displays adequate comfort level or baseline comfort level  Outcome: Progressing     Problem: ABCDS Injury Assessment  Goal: Absence of physical injury  Outcome: Progressing     Problem: Chronic Conditions and Co-morbidities  Goal: Patient's chronic conditions and co-morbidity symptoms are monitored and maintained or improved  Outcome: Progressing

## 2024-05-02 LAB
GLUCOSE BLD-MCNC: 278 MG/DL (ref 70–99)
GLUCOSE BLD-MCNC: 313 MG/DL (ref 70–99)
GLUCOSE BLD-MCNC: 356 MG/DL (ref 70–99)
GLUCOSE BLD-MCNC: 403 MG/DL (ref 70–99)
GLUCOSE BLD-MCNC: 441 MG/DL (ref 70–99)
PERFORMED ON: ABNORMAL

## 2024-05-02 PROCEDURE — 6370000000 HC RX 637 (ALT 250 FOR IP): Performed by: PHYSICIAN ASSISTANT

## 2024-05-02 PROCEDURE — 97542 WHEELCHAIR MNGMENT TRAINING: CPT

## 2024-05-02 PROCEDURE — 6360000002 HC RX W HCPCS: Performed by: INTERNAL MEDICINE

## 2024-05-02 PROCEDURE — 97535 SELF CARE MNGMENT TRAINING: CPT

## 2024-05-02 PROCEDURE — 6370000000 HC RX 637 (ALT 250 FOR IP): Performed by: PAIN MEDICINE

## 2024-05-02 PROCEDURE — 1180000000 HC REHAB R&B

## 2024-05-02 PROCEDURE — 6370000000 HC RX 637 (ALT 250 FOR IP): Performed by: INTERNAL MEDICINE

## 2024-05-02 PROCEDURE — 97530 THERAPEUTIC ACTIVITIES: CPT

## 2024-05-02 PROCEDURE — 6370000000 HC RX 637 (ALT 250 FOR IP): Performed by: PHYSICAL MEDICINE & REHABILITATION

## 2024-05-02 PROCEDURE — 6370000000 HC RX 637 (ALT 250 FOR IP)

## 2024-05-02 PROCEDURE — 97110 THERAPEUTIC EXERCISES: CPT

## 2024-05-02 PROCEDURE — 99233 SBSQ HOSP IP/OBS HIGH 50: CPT | Performed by: PHYSICAL MEDICINE & REHABILITATION

## 2024-05-02 PROCEDURE — 97116 GAIT TRAINING THERAPY: CPT

## 2024-05-02 RX ORDER — INSULIN GLARGINE 100 [IU]/ML
30 INJECTION, SOLUTION SUBCUTANEOUS EVERY MORNING
Status: DISCONTINUED | OUTPATIENT
Start: 2024-05-03 | End: 2024-05-03

## 2024-05-02 RX ORDER — PREDNISONE 20 MG/1
20 TABLET ORAL DAILY
Status: DISCONTINUED | OUTPATIENT
Start: 2024-05-02 | End: 2024-05-04 | Stop reason: HOSPADM

## 2024-05-02 RX ORDER — INSULIN LISPRO 100 [IU]/ML
15 INJECTION, SOLUTION INTRAVENOUS; SUBCUTANEOUS
Status: DISCONTINUED | OUTPATIENT
Start: 2024-05-02 | End: 2024-05-04 | Stop reason: HOSPADM

## 2024-05-02 RX ORDER — CYCLOBENZAPRINE HCL 10 MG
10 TABLET ORAL 3 TIMES DAILY
Status: DISCONTINUED | OUTPATIENT
Start: 2024-05-02 | End: 2024-05-04 | Stop reason: HOSPADM

## 2024-05-02 RX ADMIN — OXYCODONE HYDROCHLORIDE 10 MG: 10 TABLET, FILM COATED, EXTENDED RELEASE ORAL at 21:57

## 2024-05-02 RX ADMIN — GABAPENTIN 600 MG: 300 CAPSULE ORAL at 09:37

## 2024-05-02 RX ADMIN — MENTHOL AND METHYL SALICYLATE: 7.6; 29 OINTMENT TOPICAL at 20:19

## 2024-05-02 RX ADMIN — GABAPENTIN 600 MG: 300 CAPSULE ORAL at 11:19

## 2024-05-02 RX ADMIN — GABAPENTIN 600 MG: 300 CAPSULE ORAL at 20:17

## 2024-05-02 RX ADMIN — CYCLOBENZAPRINE HYDROCHLORIDE 10 MG: 10 TABLET, FILM COATED ORAL at 16:19

## 2024-05-02 RX ADMIN — Medication 100 MG: at 09:36

## 2024-05-02 RX ADMIN — OXYCODONE HYDROCHLORIDE 10 MG: 10 TABLET, FILM COATED, EXTENDED RELEASE ORAL at 00:33

## 2024-05-02 RX ADMIN — INSULIN LISPRO 4 UNITS: 100 INJECTION, SOLUTION INTRAVENOUS; SUBCUTANEOUS at 20:32

## 2024-05-02 RX ADMIN — LOSARTAN POTASSIUM 100 MG: 50 TABLET, FILM COATED ORAL at 09:35

## 2024-05-02 RX ADMIN — ATORVASTATIN CALCIUM 20 MG: 20 TABLET, FILM COATED ORAL at 20:18

## 2024-05-02 RX ADMIN — INSULIN LISPRO 15 UNITS: 100 INJECTION, SOLUTION INTRAVENOUS; SUBCUTANEOUS at 16:19

## 2024-05-02 RX ADMIN — GABAPENTIN 600 MG: 300 CAPSULE ORAL at 16:19

## 2024-05-02 RX ADMIN — PREDNISONE 20 MG: 20 TABLET ORAL at 09:37

## 2024-05-02 RX ADMIN — CYCLOBENZAPRINE 10 MG: 10 TABLET, FILM COATED ORAL at 00:33

## 2024-05-02 RX ADMIN — OXYCODONE HYDROCHLORIDE 10 MG: 10 TABLET, FILM COATED, EXTENDED RELEASE ORAL at 06:35

## 2024-05-02 RX ADMIN — FENOFIBRATE 54 MG: 54 TABLET ORAL at 09:36

## 2024-05-02 RX ADMIN — Medication 5 MG: at 22:00

## 2024-05-02 RX ADMIN — ENOXAPARIN SODIUM 30 MG: 100 INJECTION SUBCUTANEOUS at 11:20

## 2024-05-02 RX ADMIN — INSULIN LISPRO 8 UNITS: 100 INJECTION, SOLUTION INTRAVENOUS; SUBCUTANEOUS at 18:16

## 2024-05-02 RX ADMIN — PANTOPRAZOLE SODIUM 40 MG: 40 TABLET, DELAYED RELEASE ORAL at 06:35

## 2024-05-02 RX ADMIN — Medication 2000 UNITS: at 16:19

## 2024-05-02 RX ADMIN — CYCLOBENZAPRINE HYDROCHLORIDE 10 MG: 10 TABLET, FILM COATED ORAL at 20:17

## 2024-05-02 RX ADMIN — CARVEDILOL 6.25 MG: 6.25 TABLET, FILM COATED ORAL at 20:18

## 2024-05-02 RX ADMIN — CARVEDILOL 6.25 MG: 6.25 TABLET, FILM COATED ORAL at 09:36

## 2024-05-02 RX ADMIN — ENOXAPARIN SODIUM 30 MG: 100 INJECTION SUBCUTANEOUS at 20:18

## 2024-05-02 RX ADMIN — BISACODYL 5 MG: 5 TABLET, COATED ORAL at 09:36

## 2024-05-02 RX ADMIN — INSULIN LISPRO 10 UNITS: 100 INJECTION, SOLUTION INTRAVENOUS; SUBCUTANEOUS at 11:20

## 2024-05-02 RX ADMIN — INSULIN GLARGINE 50 UNITS: 100 INJECTION, SOLUTION SUBCUTANEOUS at 20:32

## 2024-05-02 RX ADMIN — ACETAMINOPHEN 650 MG: 325 TABLET ORAL at 11:20

## 2024-05-02 RX ADMIN — ACETAMINOPHEN 650 MG: 325 TABLET ORAL at 20:18

## 2024-05-02 RX ADMIN — OXYCODONE HYDROCHLORIDE 10 MG: 10 TABLET, FILM COATED, EXTENDED RELEASE ORAL at 11:19

## 2024-05-02 RX ADMIN — AMLODIPINE BESYLATE 10 MG: 10 TABLET ORAL at 09:37

## 2024-05-02 RX ADMIN — OXYCODONE HYDROCHLORIDE 10 MG: 10 TABLET, FILM COATED, EXTENDED RELEASE ORAL at 16:19

## 2024-05-02 ASSESSMENT — PAIN DESCRIPTION - ORIENTATION
ORIENTATION: LOWER;RIGHT;LEFT
ORIENTATION: RIGHT;LOWER
ORIENTATION: LOWER

## 2024-05-02 ASSESSMENT — PAIN DESCRIPTION - DESCRIPTORS
DESCRIPTORS: ACHING;STABBING
DESCRIPTORS: DISCOMFORT
DESCRIPTORS: DISCOMFORT

## 2024-05-02 ASSESSMENT — PAIN DESCRIPTION - LOCATION
LOCATION: BACK
LOCATION: BACK;LEG
LOCATION: BACK
LOCATION: BACK;LEG

## 2024-05-02 ASSESSMENT — PAIN SCALES - GENERAL
PAINLEVEL_OUTOF10: 7
PAINLEVEL_OUTOF10: 8
PAINLEVEL_OUTOF10: 6
PAINLEVEL_OUTOF10: 7

## 2024-05-02 NOTE — PROGRESS NOTES
Physical Therapy Rehab Treatment Note  Facility/Department: [facility]  Room: R248/R248-01       NAME: Will Bravo  : 1960 (63 y.o.)  MRN: [id number]  CODE STATUS: Full Code    Date of Service: 2024       Restrictions:  Restrictions/Precautions: Fall Risk, Weight Bearing  Lower Extremity Weight Bearing Restrictions  Left Lower Extremity Weight Bearing: Weight Bearing As Tolerated  Position Activity Restriction  Other position/activity restrictions: Limit excessive bending, lifting >10lb and twisting based on pain tolerance       SUBJECTIVE:   Subjective: Pt reports he did good throughout therapy this morning but is now experiencing increased pain. Pt's sister present for session    Pain  Pain: 6/10 pre / post - reports being recently medicated      OBJECTIVE:     Bed Mobility  Additional Factors: Head of bed flat;Without handrails  Roll Left  Assistance Level: Supervision  Roll Right  Assistance Level: Supervision  Sit to Supine  Assistance Level: Supervision  Supine to Sit  Assistance Level: Supervision  Scooting  Assistance Level: Supervision      PT Exercises  PROM Exercises: Supine: hamstring stretching RLE with STM / hamstring splay provided , piriformis stretch RLE 30\" x3  A/AROM Exercises: Supine: bridges x5 , RLE hip/knee flx/ext x10 , QS x5 RLE         ASSESSMENT/PROGRESS TOWARDS GOALS:   Assessment  Assessment: No weight bearing tasks performed this session secondary to pt experiencing increased pain post therapy this AM. Emphasis on decreasing pain and tightness with stretching. Education provided to pt and pt's sister on technique and timing of stretches. Pt able to demo proper technique to this PTA so that he can perform throughout the day. STM utilized as well during hamstring stretching to decrease pain and tightness.    Goals:  Long Term Goals  Long Term Goal 1: Pt will demonstrate bed mobility indep  Long Term Goal 2: Pt will demonstrate transfers indep with safest AD  Long Term Goal 3:

## 2024-05-02 NOTE — PROGRESS NOTES
K 4.6   CL 97   CO2 26   BUN 29*   CREATININE 1.04   CALCIUM 9.4     No results for input(s): \"AST\", \"ALT\", \"BILIDIR\", \"BILITOT\", \"ALKPHOS\" in the last 72 hours.  No results for input(s): \"INR\" in the last 72 hours.  No results for input(s): \"CKTOTAL\", \"TROPONINI\" in the last 72 hours.    Urinalysis:    No results found for: \"NITRU\", \"WBCUA\", \"BACTERIA\", \"RBCUA\", \"BLOODU\", \"SPECGRAV\", \"GLUCOSEU\"    Radiology:  XR FOOT LEFT (MIN 3 VIEWS)   Final Result      XR ANKLE LEFT (MIN 3 VIEWS)   Final Result          Assessment/Plan:    Active Hospital Problems    Diagnosis Date Noted    Impaired mobility and ADLs [Z74.09, Z78.9] 04/30/2024    Lumbar post-laminectomy syndrome [M96.1] 04/30/2024    Intractable back pain [M54.9] 04/29/2024    Acute post-operative pain [G89.18] 04/28/2024    Back spasm [M62.830] 04/26/2024    Impaired mobility and activities of daily living dt  L4-5 MICRODISECTOMY DECOMPRESSION (Spine Lumbar) [Z74.09, Z78.9] 04/22/2024    Poorly controlled diabetes mellitus (HCC) [E11.65] 04/19/2024    Class 3 severe obesity due to excess calories with serious comorbidity and body mass index (BMI) of 40.0 to 44.9 in adult (HCC) [E66.01, Z68.41] 04/18/2024    Situational depression [F43.21] 11/14/2022    Metabolic syndrome [E88.810] 03/17/2022    Diabetic nephropathy associated with type 2 diabetes mellitus (HCC) [E11.21] 03/04/2022    Cirrhosis of liver without ascites (HCC) [K74.60] 11/10/2020    Chronic pain of right knee [M25.561, G89.29] 02/11/2020    Displacement of lumbar intervertebral disc without myelopathy [M51.26] 07/21/2015    Dyslipidemia [E78.5] 02/05/2015    Essential hypertension [I10] 02/05/2015    Reflux esophagitis [K21.00] 02/05/2015    Diabetes mellitus type 2, insulin dependent (HCC) [E11.9, Z79.4] 10/13/2011        Principal Problem:    Impaired mobility and activities of daily living dt  L4-5 MICRODISECTOMY DECOMPRESSION (Spine Lumbar)     Active Problems:    Diabetes mellitus type 2,  Added Salt (3-4 gm)    Code Status: Full Code    PT/OT Eval       Electronically signed by REUBEN Bowser - CNP on 5/2/2024 at 2:52 PM

## 2024-05-02 NOTE — PROGRESS NOTES
Subjective:  The patient complains of severe acute on chronic progressive fatigue and low back pain specifically right SI area partially relieved by rest, medications, ice, PT,  OT,   SLP and rest and exacerbated by recent surgery.  Patient is recovering from recent L4 or L5 microdiscectomy on the care Dr. 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  He has had increased groin pain and difficulty with mobility.  He is admitted  4/23/24 through the ER.     He was not able to ambulate well at home and had fallen.  He states that he injured his left foot when he fell and has some bruising in that area.  Stat x-rays of left foot and ankle 4/30/2024:  Left ankle: The mortise joint is preserved.  No radiopaque foreign body,  acute fracture or dislocation is seen.  A moderate-sized spur is seen on the  plantar aspect of the calcaneus and an enthesophyte is seen on the dorsal  aspect.  Tiny spur is also seen on the distal end of the tibia.     Left foot: Mild swelling is seen over the foot.  Spurring is seen on the  medial aspect of the head of the base of the 5th metatarsal.  Mild  degenerative changes are seen in the 1st MTP joint.  No periostitis is seen  to suggest stress fracture.  There are vascular calcifications seen in the  foot.

## 2024-05-02 NOTE — PROGRESS NOTES
OCCUPATIONAL THERAPY  INPATIENT REHAB TREATMENT NOTE  [facility]      NAME: Will Bravo  : 1960 (63 y.o.)  MRN: [id number]  CODE STATUS: Full Code  Room: R248/R248-01    Date of Service: 2024    Referring Physician: Dr Ruff  Rehab Diagnosis: Impaired mobility and ADL's due to severe lumbar stenosis    Hospital course:   Comments: Pt had L4-L5 microdiscectomy decompression on 2024 under the service of Dr. Kingston. Pain in his back has gradually worsening. Pain goes from rt groin down the rt leg. Pt wanting to be admitted to rehab for therapy due topain numbness and weakness down the right leg to the foot and difficulty walking . He has not worked with physical therapy secondary to pain.    Restrictions  Restrictions/Precautions  Restrictions/Precautions: Fall Risk, Weight Bearing   Lower Extremity Weight Bearing Restrictions  Left Lower Extremity Weight Bearing: Weight Bearing As Tolerated        Position Activity Restriction  Other position/activity restrictions: Limit excessive bending, lifting >10lb and twisting based on pain tolerance    Patient's date of birth confirmed: Yes    SAFETY:  Safety Devices  Type of devices: All fall risk precautions in place    SUBJECTIVE:   \"I still feel pretty good\"    Pain at start of treatment: Yes: 4/10    Pain at end of treatment: Yes: 4/10    Location: R groin area  Description ache  Nursing notified: Declined    OBJECTIVE:    While seated, completed fine motor task with focus on coordination, activity tolerance, and strength in order to improve general function.    Challenged pt through usage of theraclips of natural resistance levels. Pt required to manipulate clips through pinching/grasping, reaching and placing according to instruction onto vertical dowel. Pt then required to retrieve.   Repetitive bilateral UE reaches completed to various planes and directions and at times required >90 degrees of shoulder flexion. Good ability to manage clips

## 2024-05-02 NOTE — PROGRESS NOTES
Pt assessment completed. Vital signs taken. Medications given per MAR. Pt reported pain 6/10 lower back pain improved with rest and medication. EDELMIRA in room due to patient moving in sleep, wife reporting possibility of  falling due to the manner of how the pt sleeps. Pt reports they sleep with their legs off the bed to comfort them and lessen their pain. Pt denies any other needs. Pt bed alarm on, bed locked, call light within reach, and bed in lowest position.

## 2024-05-02 NOTE — PROGRESS NOTES
Physical Therapy Rehab Treatment Note  Facility/Department[facility]  [facility]       NAME: Will Bravo  : 1960 (63 y.o.)  MRN: [id number]  CODE STATUS: Full Code    Date of Service: 2024       Restrictions:  Restrictions/Precautions: Fall Risk, Weight Bearing  Lower Extremity Weight Bearing Restrictions  Left Lower Extremity Weight Bearing: Weight Bearing As Tolerated  Position Activity Restriction  Other position/activity restrictions: Limit excessive bending, lifting >10lb and twisting based on pain tolerance       SUBJECTIVE:   Subjective: Pt reports he's having increased pain this afternoon, \"I'll try to do what you want me to do\"    Pain  Pain: 7/10 pain at start of session, pt reports he thinks he had pain meds around noon. Also states \"it'll get worse if I stand up\".      OBJECTIVE:         Scooting  Assistance Level: Supervision  Skilled Clinical Factors: to EOB and along EOB    Transfers  Surface: To mat;From mat;Wheelchair;To bed  Additional Factors: Verbal cues;Hand placement cues;Increased time to complete  Device: Walker (no AD)  Sit to Stand  Assistance Level: Stand by assist  Skilled Clinical Factors: SBA for safety, no cues provided  Stand to Sit  Assistance Level: Stand by assist;Minimal assistance (touching assist)  Skilled Clinical Factors: vc's to reach back prior to sitting with inconsistent carry over, occasional touching assist for safety this afternoon  Bed To/From Chair  Technique: Stand step  Assistance Level: Stand by assist  Skilled Clinical Factors: wc <-> mat and wc -> bed, SBA for safety as well as vc's for safe hand placement prior to sitting. pt required cuing to lock wc prior to transferring to bed as he started to stand up with wc unlocked    Ambulation  Surface: Level surface  Device: Rolling walker  Distance: 40'  Activity: Within Unit  Activity Comments: heavy UE bracing on ww, step to gait pattern with minimal WB noted through RLE  Additional Factors:  with safest AD with proper gait mechanics  Long Term Goal 4: Pt will demonstrate stair negotiation 3 steps with 1 rail Kaleb  Long Term Goal 5: Pt will be indep with w/c mobility >150ft  Patient Goals   Patient Goals : decrease pain    PLAN OF CARE/Safety:   Safety Devices  Type of Devices: All fall risk precautions in place;Bed alarm in place;Left in bed;Call light within reach      Therapy Time:   Individual   Time In 1430   Time Out 1500   Minutes 30      Minutes: 30  Transfer/Bed mobility training: 15  Gait training: 10  Neuro re education: 0  Therapeutic ex: Giorgi GODINEZ PTA, 05/02/24 at 4:17 PM

## 2024-05-02 NOTE — PROGRESS NOTES
Facility/Department: [facility] RE[facility]AB  Rehabilitation Goal update: Physical Therapy  [facility]        Pts goals updated as listed below:  Long Term Goal 1: Pt will demonstrate bed mobility indep  Long Term Goal 2: Pt will demonstrate transfers indep with safest AD  Long Term Goal 3: Pt will demonstrate amb 50-150ft indep with safest AD with proper gait mechanics  Long Term Goal 4: Pt will demonstrate stair negotiation 3 steps with 1 rail Kaleb  Long Term Goal 5: Pt will be indep with w/c mobility >150ft    Electronically signed by Angelica Manzano, SPT on 5/2/2024 at 9:26 AM  Direct supervision provided by Roxann Olivares, PT

## 2024-05-02 NOTE — PROGRESS NOTES
memory I @FLOWTIME(304  SP:        Social History     Socioeconomic History    Marital status:      Spouse name: Not on file    Number of children: Not on file    Years of education: Not on file    Highest education level: Not on file   Occupational History    Not on file   Tobacco Use    Smoking status: Former     Current packs/day: 0.00     Types: Cigarettes     Quit date: 10/13/2008     Years since quitting: 15.5    Smokeless tobacco: Never   Vaping Use    Vaping Use: Never used   Substance and Sexual Activity    Alcohol use: Yes     Alcohol/week: 0.0 standard drinks of alcohol    Drug use: No    Sexual activity: Not on file   Other Topics Concern    Not on file   Social History Narrative      Social/Functional History                                        Social Determinants of Health     Financial Resource Strain: Not on file   Food Insecurity: No Food Insecurity (4/29/2024)    Hunger Vital Sign     Worried About Running Out of Food in the Last Year: Never true     Ran Out of Food in the Last Year: Never true   Transportation Needs: No Transportation Needs (4/29/2024)    PRAPARE - Transportation     Lack of Transportation (Medical): No     Lack of Transportation (Non-Medical): No   Physical Activity: Not on file   Stress: Not on file   Social Connections: Not on file   Intimate Partner Violence: Not on file   Housing Stability: Low Risk  (4/29/2024)    Housing Stability Vital Sign     Unable to Pay for Housing in the Last Year: No     Number of Places Lived in the Last Year: 1     Unstable Housing in the Last Year: No           THERAPY, MEDICAL AND NURSING COORDINATION:    [x]  Pain medication before therapies     [x]  Check orthostatic BP and monitor heart rate and medications effects with therapy      [x]  Ambulate to the bathroom in room    [x]  Add scheduled rest beaks     [x]  In room therapies as needed      Discharge date set for:              5/7/24      Home with:   wife  with help from   wife             And:     Home Health Care:     [x]  PT    [x]  OT    []  ST   [x]  Aide       [x]  RN                    Equipment:  WW,  WC      At D/C their function is goaled at:   PT:Long Term Goal 1: Pt will demonstrate bed mobility indep  Long Term Goal 2: Pt will demonstrate transfers indep with safest AD  Long Term Goal 3: Pt will demonstrate amb >/= 150ft indep with safest AD with proper gait mechanics  Long Term Goal 4: Pt will demonstrate stair negotiation 3 steps with 1 rail supervision  OT:Eating  Assistance Needed: Setup or clean-up assistance  CARE Score: 5  Discharge Goal: Independent, Oral Hygiene  Assistance Needed: Setup or clean-up assistance  CARE Score: 5  Discharge Goal: Independent, Toileting Hygiene  Assistance needed: Supervision or touching assistance  CARE Score: 4  Discharge Goal: Independent, Shower/Bathe Self  Assistance Needed: Partial/moderate assistance  CARE Score: 3  Discharge Goal: Independent  Upper Body Dressing  Assistance Needed: Setup or clean-up assistance  CARE Score: 5  Discharge Goal: Independent, Lower Body Dressing  Assistance Needed: Supervision or touching assistance  CARE Score: 4  Discharge Goal: Independent, Putting On/Taking Off Footwear  Assistance Needed: Dependent  CARE Score: 1  Discharge Goal: Independent, Toilet Transfer  Assistance needed: Partial/moderate assistance  CARE Score: 3  Discharge Goal: Independent  SP:               From a cognitive standpoint they will need:        24 hr vs daily transitioning to supervision  -->progress to occasional             Significant problems/ barriers to functional progress include: Pt is at a high risk for functional loss,      []  Acute infection/UTI    []  Low BP's     []  COPD flare-up   []  Uncontrolled blood sugar     []  Progressive anemia     [x]  poor endurance    []  Severe pain     []  Impaired mental status    []  Urinary incontinence    []  Bowel incontinence           Plan to correct barriers to functional

## 2024-05-02 NOTE — CONSULTS
[facility]                   [address] [place] [address]                              CONSULTATION      PATIENT NAME: JASBIR JASON               : 1960  MED REC NO: [id number]                        ROOM: R248  ACCOUNT NO: [id number]                       ADMIT DATE: 2024  PROVIDER: Kenny Deng MD    ENDOCRINE CONSULT    CONSULT DATE:  2024    REFERRING PHYSICIAN:  Sakina Nettles      REASON FOR CONSULT:  Management of type 2 diabetes.    CHIEF COMPLAINT/HISTORY OF PRESENT ILLNESS:  The patient is a 63-year-old male who was recently admitted for elective back surgery because of spinal stenosis, had L4-5 microdissection, decompression surgery.  The patient was complaining of severe pain over his right hip area.  The patient is currently on prednisone 10 mg daily and did get a steroid injection a couple of days ago.  Blood sugars are staying between 200 and 350 range.  Currently, on Lantus 30 units at bedtime, Humalog coverage.  P.o. intake has been variable.  The patient also on gabapentin.  Admitted to rehab facility.  Reviewed Neurosurgical consult, right sacroiliitis and hip trochanteric bursitis.  The patient did notice improvement in his leg pain after having the recent back surgery and microdissection.    PAST MEDICAL HISTORY:  Significant for type 2 diabetes, hypertension, pancreatitis.    PAST SURGICAL HISTORY:  Recent laminectomy in 2022, knee arthroscopic surgery, tonsillectomy.    FAMILY HISTORY:  Significant for diabetes, hyperlipidemia, arthritis, heart disease.    PERSONAL SOCIAL HISTORY:  Does smoke cigarettes.  Denies any substance abuse.    ALLERGIES:  NONE.      MEDICATIONS:  Here include Lantus 30 at night, Humalog coverage, Lipitor, Dulcolax, Coreg, coenzyme Q10, vitamin B12, Lovenox, Tricor, gabapentin, hydrochlorothiazide, losartan, prednisone 10 mg daily, Protonix.    REVIEW OF SYSTEMS:  Other than right hip pain, 14-point review

## 2024-05-02 NOTE — CARE COORDINATION
LSW met with pt, wife, and sister and discussed discharge date as well as goals. LSW discussed that pt may need a wheelchair for homegoing, however pt noted that he did ambulate today and is hopeful that he continues to make these gains. Pt's wife works as a  full time and will be back to work next week. They stressed the importance of pt's pain being managed so that he can function at home while she is working. HHC vs OP was discussed, pt and wife plan to discuss further and will follow up with LSW. Family training scheduled for Friday 5/3 at 9:30AM. Electronically signed by ARMANDO Licea, DENNISE on 5/2/2024 at 4:05 PM

## 2024-05-02 NOTE — PROGRESS NOTES
Physical Therapy Rehab Treatment Note  Facility/Department: [facility] [place] [facility]  [facility]       NAME: Will Bravo  : 1960 (63 y.o.)  MRN: [id number]  CODE STATUS: Full Code    Date of Service: 2024       Restrictions:  Restrictions/Precautions: Fall Risk, Weight Bearing  Lower Extremity Weight Bearing Restrictions  Left Lower Extremity Weight Bearing: Weight Bearing As Tolerated  Position Activity Restriction  Other position/activity restrictions: Limit excessive bending, lifting >10lb and twisting based on pain tolerance       SUBJECTIVE:   Subjective: \"I'm feeling pretty good right now\"    Pain  Pain: 5/10 pain in R LBP that extends down and around RLE, especially in R knee and shin this AM. pt previously medicated.      OBJECTIVE:         Sit to Supine  Assistance Level: Supervision  Skilled Clinical Factors: on therapy mat, good technique noted  Scooting  Assistance Level: Supervision  Skilled Clinical Factors: to EOB and along EOB    Transfers  Surface: From bed;To chair with arms;From chair with arms;Wheelchair;To bed;To mat;From mat  Additional Factors: Verbal cues;Hand placement cues;Increased time to complete  Device: Walker  Sit to Stand  Assistance Level: Stand by assist  Skilled Clinical Factors: good carry over with hand placement and technique, heavy UE bracing on ww once standing  Stand to Sit  Assistance Level: Stand by assist  Skilled Clinical Factors: vc's to reach back prior to sitting, especially following gait as pt tends to rush and will \"plop\" into chair  Bed To/From Chair  Technique: Stand pivot;Stand step  Assistance Level: Stand by assist  Skilled Clinical Factors: bed <-> wc, SBA for safety though pt demonstrated good technique and sequencing    Ambulation  Surface: Level surface  Device: Rolling walker  Distance: 2 x20'  Activity: Within Unit  Activity Comments: heavy UE bracing on ww, step to gait pattern with minimal WB noted through RLE  Additional Factors: Verbal  cues;Hand placement cues;Increased time to complete  Assistance Level: Stand by assist  Gait Deviations: Slow adonis;Decreased step length bilateral;Decreased weight shift right;Narrow base of support  Skilled Clinical Factors: vc's to increase WB through RLE and decrease UE bracing as able as well as for safe approach to sitting surfaces following distance       Wheelchair  Surface: Level surface;Uneven surface;Carpet;Ramp  Device: Standard wheelchair  Additional Factors: Hand placement cues;Increased time to complete  Assistance Required to Manage Parts: Left leg rest;Right leg rest  Assistance Level for Propulsion: Supervision  Propulsion Method: Bilateral lower extremities;Bilateral upper extremities;Left lower extremity  Propulsion Quality: Decreased fluidity  Propulsion Distance: 200'  Skilled Clinical Factors: short rest breaks taken to complete distance, intermittenly changes propulsion method. increased RLE pain reported toward end of distance       PT Exercises  A/AROM Exercises: seated: AP, LAQ, march, hip add with ball x15ea musa  Resistive Exercises: seated with YTB: ham curl, hip abd x15ea musa  Exercise Equipment: CP applied to R knee/thigh/shin intermittently throughout session      Activity Tolerance  Activity Tolerance: Patient tolerated treatment well;Patient limited by pain       ASSESSMENT/PROGRESS TOWARDS GOALS:   Assessment  Assessment: Pt with improved tolerance to session this AM, able to ambulate with ww 20 x2 while spot hopping around therapy gym. No physical assistance required throughout transfers or gait though pt did require increased cuing when approaching chairs following ambulation as he was slightly impulsive due to increased RLE pain and would \"plop\" into chair rather than reaching back and controlling sit.  Activity Tolerance: Patient tolerated treatment well;Patient limited by pain    Goals:  Long Term Goals  Long Term Goal 1: Pt will demonstrate bed mobility indep  Long Term

## 2024-05-02 NOTE — PROGRESS NOTES
OCCUPATIONAL THERAPY  INPATIENT REHAB TREATMENT NOTE  [facility]      NAME: Will Bravo  : 1960 (63 y.o.)  MRN: [id number]  CODE STATUS: Full Code  Room: R248/R248-01    Date of Service: 2024    Referring Physician: Dr Ruff  Rehab Diagnosis: Impaired mobility and ADL's due to severe lumbar stenosis    Hospital course:   Comments: Pt had L4-L5 microdiscectomy decompression on 2024 under the service of Dr. Kingston. Pain in his back has gradually worsening. Pain goes from rt groin down the rt leg. Pt wanting to be admitted to rehab for therapy due topain numbness and weakness down the right leg to the foot and difficulty walking . He has not worked with physical therapy secondary to pain.    Restrictions  Restrictions/Precautions  Restrictions/Precautions: Fall Risk, Weight Bearing   Lower Extremity Weight Bearing Restrictions  Left Lower Extremity Weight Bearing: Weight Bearing As Tolerated        Position Activity Restriction  Other position/activity restrictions: Limit excessive bending, lifting >10lb and twisting based on pain tolerance    Patient's date of birth confirmed: Yes    SAFETY:  Safety Devices  Type of devices: All fall risk precautions in place    SUBJECTIVE:   :I will take a quick one (shower)    Pain at start of treatment: Yes: 10    Pain at end of treatment: Yes: 10/10    Location: L leg- proximal  Description ache  Nursing notified: Declined  Reports nursing aware     COGNITION:     Problem Solving: Supervision    Min cues to improve safety awareness / decision making. Pt continues to rush d.t pain at times    OBJECTIVE:    ADL- shower completed    Grooming/Oral Hygiene  Assistance Level: Modified independent  Skilled Clinical Factors: seated at sink  Upper Extremity Bathing  Assistance Level: Modified independent  Lower Extremity Bathing  Assistance Level: Stand by assist  Upper Extremity Dressing  Assistance Level: Modified independent  Lower Extremity  Dressing  Assistance Level: Stand by assist  Putting On/Taking Off Footwear  Assistance Level: Supervision  Skilled Clinical Factors: seated in w/c  Toileting  Skilled Clinical Factors: declined  Tub/Shower Transfers  Type: Shower  Transfer From: Rolling walker  Transfer To: Shower chair with back  Additional Factors: Verbal cues  Assistance Level: Stand by assist       Functional Mobility  Device: Rolling walker  Activity: To/From bathroom  Assistance Level: Stand by assist  Skilled Clinical Factors: cues for technique, slow pace.  Supine to Sit  Assistance Level: Stand by assist  Scooting  Assistance Level: Stand by assist  Sit to Stand  Assistance Level: Stand by assist  Stand to Sit  Assistance Level: Stand by assist    Education provided regarding AE for decreased pain during self-care - pt wanting to trial for next ADL session     While seated edge of bed, challenged strength, activity tolerance, ROM, and flexibility for improved ADL performance.    Challenged pt through usage of 2# weight to complete BUE exercises. 1 sets x 10 reps completed. Exercises focused on all UE joints and planes of motion including scapular protraction/retraction, shoulder flexion/extension/rotation/horizontal abduction, elbow flexion/extension, supination/pronation, and wrist/digit flexion/extension. Pt with improved tolerance this session. Good participation     Education:  Education  Education Given To: Patient  Education Provided: Plan of Care;Role of Therapy;Safety;Precautions;ADL Function;Transfer Training  Education Method: Demonstration;Verbal  Education Outcome: Continued education needed    ASSESSMENT:  Activity Tolerance: Patient limited by pain    Pt still with pain however great overall improvement/tolerance of session. Pt agreeable to ADL. Improved ADLs and transfers today. Pt still needs some cues to improve safety and rest breaks to relieve pain. Pt's sister present for entire session     PLAN OF CARE:  Strengthening,

## 2024-05-03 LAB
GLUCOSE BLD-MCNC: 205 MG/DL (ref 70–99)
GLUCOSE BLD-MCNC: 303 MG/DL (ref 70–99)
GLUCOSE BLD-MCNC: 329 MG/DL (ref 70–99)
GLUCOSE BLD-MCNC: 363 MG/DL (ref 70–99)
PERFORMED ON: ABNORMAL

## 2024-05-03 PROCEDURE — 6370000000 HC RX 637 (ALT 250 FOR IP): Performed by: INTERNAL MEDICINE

## 2024-05-03 PROCEDURE — 6370000000 HC RX 637 (ALT 250 FOR IP): Performed by: PHYSICIAN ASSISTANT

## 2024-05-03 PROCEDURE — 99232 SBSQ HOSP IP/OBS MODERATE 35: CPT | Performed by: INTERNAL MEDICINE

## 2024-05-03 PROCEDURE — 99231 SBSQ HOSP IP/OBS SF/LOW 25: CPT | Performed by: PAIN MEDICINE

## 2024-05-03 PROCEDURE — 99232 SBSQ HOSP IP/OBS MODERATE 35: CPT | Performed by: PHYSICAL MEDICINE & REHABILITATION

## 2024-05-03 PROCEDURE — 6370000000 HC RX 637 (ALT 250 FOR IP): Performed by: PHYSICAL MEDICINE & REHABILITATION

## 2024-05-03 PROCEDURE — 6370000000 HC RX 637 (ALT 250 FOR IP)

## 2024-05-03 PROCEDURE — 97535 SELF CARE MNGMENT TRAINING: CPT

## 2024-05-03 PROCEDURE — 6360000002 HC RX W HCPCS: Performed by: INTERNAL MEDICINE

## 2024-05-03 PROCEDURE — 97530 THERAPEUTIC ACTIVITIES: CPT

## 2024-05-03 PROCEDURE — 97116 GAIT TRAINING THERAPY: CPT

## 2024-05-03 PROCEDURE — 6370000000 HC RX 637 (ALT 250 FOR IP): Performed by: PAIN MEDICINE

## 2024-05-03 PROCEDURE — 1180000000 HC REHAB R&B

## 2024-05-03 PROCEDURE — 97110 THERAPEUTIC EXERCISES: CPT

## 2024-05-03 RX ORDER — INSULIN GLARGINE 100 [IU]/ML
40 INJECTION, SOLUTION SUBCUTANEOUS EVERY MORNING
Status: DISCONTINUED | OUTPATIENT
Start: 2024-05-04 | End: 2024-05-04 | Stop reason: HOSPADM

## 2024-05-03 RX ADMIN — PANTOPRAZOLE SODIUM 40 MG: 40 TABLET, DELAYED RELEASE ORAL at 06:21

## 2024-05-03 RX ADMIN — OXYCODONE HYDROCHLORIDE 10 MG: 10 TABLET, FILM COATED, EXTENDED RELEASE ORAL at 02:52

## 2024-05-03 RX ADMIN — CYCLOBENZAPRINE HYDROCHLORIDE 10 MG: 10 TABLET, FILM COATED ORAL at 08:12

## 2024-05-03 RX ADMIN — PREDNISONE 20 MG: 20 TABLET ORAL at 08:12

## 2024-05-03 RX ADMIN — INSULIN GLARGINE 30 UNITS: 100 INJECTION, SOLUTION SUBCUTANEOUS at 08:14

## 2024-05-03 RX ADMIN — ACETAMINOPHEN 650 MG: 325 TABLET ORAL at 04:22

## 2024-05-03 RX ADMIN — INSULIN LISPRO 4 UNITS: 100 INJECTION, SOLUTION INTRAVENOUS; SUBCUTANEOUS at 20:48

## 2024-05-03 RX ADMIN — ATORVASTATIN CALCIUM 20 MG: 20 TABLET, FILM COATED ORAL at 20:47

## 2024-05-03 RX ADMIN — INSULIN LISPRO 8 UNITS: 100 INJECTION, SOLUTION INTRAVENOUS; SUBCUTANEOUS at 16:46

## 2024-05-03 RX ADMIN — BISACODYL 5 MG: 5 TABLET, COATED ORAL at 08:11

## 2024-05-03 RX ADMIN — AMLODIPINE BESYLATE 10 MG: 10 TABLET ORAL at 08:11

## 2024-05-03 RX ADMIN — FENOFIBRATE 54 MG: 54 TABLET ORAL at 08:11

## 2024-05-03 RX ADMIN — ACETAMINOPHEN 650 MG: 325 TABLET ORAL at 15:40

## 2024-05-03 RX ADMIN — HYDROCHLOROTHIAZIDE 25 MG: 25 TABLET ORAL at 06:21

## 2024-05-03 RX ADMIN — OXYCODONE HYDROCHLORIDE 10 MG: 10 TABLET, FILM COATED, EXTENDED RELEASE ORAL at 21:51

## 2024-05-03 RX ADMIN — Medication 2000 UNITS: at 16:46

## 2024-05-03 RX ADMIN — CARVEDILOL 6.25 MG: 6.25 TABLET, FILM COATED ORAL at 20:49

## 2024-05-03 RX ADMIN — GABAPENTIN 600 MG: 300 CAPSULE ORAL at 12:23

## 2024-05-03 RX ADMIN — Medication 5 MG: at 21:50

## 2024-05-03 RX ADMIN — CYCLOBENZAPRINE HYDROCHLORIDE 10 MG: 10 TABLET, FILM COATED ORAL at 15:41

## 2024-05-03 RX ADMIN — GABAPENTIN 600 MG: 300 CAPSULE ORAL at 08:10

## 2024-05-03 RX ADMIN — LOSARTAN POTASSIUM 100 MG: 50 TABLET, FILM COATED ORAL at 08:10

## 2024-05-03 RX ADMIN — OXYCODONE HYDROCHLORIDE 10 MG: 10 TABLET, FILM COATED, EXTENDED RELEASE ORAL at 12:23

## 2024-05-03 RX ADMIN — INSULIN LISPRO 15 UNITS: 100 INJECTION, SOLUTION INTRAVENOUS; SUBCUTANEOUS at 08:15

## 2024-05-03 RX ADMIN — MENTHOL AND METHYL SALICYLATE: 7.6; 29 OINTMENT TOPICAL at 08:16

## 2024-05-03 RX ADMIN — OXYCODONE HYDROCHLORIDE 10 MG: 10 TABLET, FILM COATED, EXTENDED RELEASE ORAL at 16:45

## 2024-05-03 RX ADMIN — ENOXAPARIN SODIUM 30 MG: 100 INJECTION SUBCUTANEOUS at 20:47

## 2024-05-03 RX ADMIN — CARVEDILOL 6.25 MG: 6.25 TABLET, FILM COATED ORAL at 08:10

## 2024-05-03 RX ADMIN — GABAPENTIN 600 MG: 300 CAPSULE ORAL at 16:52

## 2024-05-03 RX ADMIN — ENOXAPARIN SODIUM 30 MG: 100 INJECTION SUBCUTANEOUS at 08:11

## 2024-05-03 RX ADMIN — INSULIN LISPRO 15 UNITS: 100 INJECTION, SOLUTION INTRAVENOUS; SUBCUTANEOUS at 16:47

## 2024-05-03 RX ADMIN — GABAPENTIN 600 MG: 300 CAPSULE ORAL at 20:48

## 2024-05-03 RX ADMIN — CYCLOBENZAPRINE HYDROCHLORIDE 10 MG: 10 TABLET, FILM COATED ORAL at 20:48

## 2024-05-03 RX ADMIN — INSULIN LISPRO 15 UNITS: 100 INJECTION, SOLUTION INTRAVENOUS; SUBCUTANEOUS at 12:24

## 2024-05-03 RX ADMIN — INSULIN GLARGINE 50 UNITS: 100 INJECTION, SOLUTION SUBCUTANEOUS at 20:48

## 2024-05-03 RX ADMIN — Medication 100 MG: at 08:10

## 2024-05-03 RX ADMIN — INSULIN LISPRO 6 UNITS: 100 INJECTION, SOLUTION INTRAVENOUS; SUBCUTANEOUS at 08:15

## 2024-05-03 RX ADMIN — INSULIN LISPRO 2 UNITS: 100 INJECTION, SOLUTION INTRAVENOUS; SUBCUTANEOUS at 12:23

## 2024-05-03 ASSESSMENT — PAIN SCALES - GENERAL
PAINLEVEL_OUTOF10: 7
PAINLEVEL_OUTOF10: 6
PAINLEVEL_OUTOF10: 6
PAINLEVEL_OUTOF10: 7
PAINLEVEL_OUTOF10: 8
PAINLEVEL_OUTOF10: 8

## 2024-05-03 ASSESSMENT — PAIN DESCRIPTION - LOCATION
LOCATION: BACK;LEG
LOCATION: BACK
LOCATION: LEG
LOCATION: BACK

## 2024-05-03 ASSESSMENT — PAIN DESCRIPTION - DESCRIPTORS
DESCRIPTORS: ACHING
DESCRIPTORS: ACHING
DESCRIPTORS: DISCOMFORT
DESCRIPTORS: DISCOMFORT
DESCRIPTORS: ACHING
DESCRIPTORS: DISCOMFORT
DESCRIPTORS: ACHING
DESCRIPTORS: DISCOMFORT

## 2024-05-03 ASSESSMENT — PAIN DESCRIPTION - ORIENTATION: ORIENTATION: RIGHT

## 2024-05-03 NOTE — CARE COORDINATION
LSW spoke with pt and wife, given freedom of choice pt would like [facility]. Referral was made. Pt and wife do not want a wheelchair ordered and feel he would be fine without it. Pt noted that he may want to discharge tomorrow or Sunday as long as his pain continues to be managed. LSW left voicemail with PMR. Electronically signed by ARMANDO Licea, DENNISE on 5/3/2024 at 5:21 PM

## 2024-05-03 NOTE — PROGRESS NOTES
Physical Therapy Rehab Treatment Note  Facility/Department: [facility] [place] [facility]  Room: R2/R248-01       NAME: Will Bravo  : 1960 (63 y.o.)  MRN: [id number]  CODE STATUS: Full Code    Date of Service: 5/3/2024       Restrictions:  Restrictions/Precautions: Fall Risk, Weight Bearing  Lower Extremity Weight Bearing Restrictions  Left Lower Extremity Weight Bearing: Weight Bearing As Tolerated  Position Activity Restriction  Other position/activity restrictions: Limit excessive bending, lifting >10lb and twisting based on pain tolerance       SUBJECTIVE:   Subjective: \"I'm really hurting\"    Pain  Pain: 7/10 pain on lateral side of lower leg, pt reports he was previously medicated.      OBJECTIVE:            Transfers  Surface: To mat;From mat;Wheelchair  Additional Factors: Verbal cues;Hand placement cues;Increased time to complete  Device:  (no AD)  Sit to Stand  Assistance Level: Supervision  Skilled Clinical Factors: good technique and hand placement noted  Stand to Sit  Assistance Level: Stand by assist  Skilled Clinical Factors: vc's to reach back prior to sitting, improved safety this session  Bed To/From Chair  Technique: Stand pivot  Assistance Level: Supervision  Skilled Clinical Factors: wc <-> from mat, occasional cuing for activity pacing to improve safety as pt tends to rush when experiecing increased pain        PT Exercises  A/AROM Exercises: seated: AP, LAQ, march, hip add with ball x20ea musa  Resistive Exercises: seated with YTB: ham curl, hip abd x20ea musa  Exercise Equipment: CP applied to R thigh/shin intermittently throughout session        Activity Tolerance  Activity Tolerance: Patient limited by pain          ASSESSMENT/PROGRESS TOWARDS GOALS:   Assessment  Assessment: Pt unable to attempt any out of chair activities this afternoon due to increased pain. Rest breaks taken between all seated exercises to recover from pain and ice RLE, pt does report some relief from ice pack and resting. Pt

## 2024-05-03 NOTE — PROGRESS NOTES
Physical Therapy Rehab Treatment Note  Facility/Department: [facility] [place] [facility]  Room: R248/R248-01       NAME: Will Bravo  : 1960 (63 y.o.)  MRN: [id number]  CODE STATUS: Full Code    Date of Service: 5/3/2024       Restrictions:  Restrictions/Precautions: Fall Risk, Weight Bearing  Lower Extremity Weight Bearing Restrictions  Left Lower Extremity Weight Bearing: Weight Bearing As Tolerated  Position Activity Restriction  Other position/activity restrictions: Limit excessive bending, lifting >10lb and twisting based on pain tolerance       SUBJECTIVE:   Subjective: \"My wife should be here any minute\", Pt agreeable to family training.    Pain  Pain: 5/10 pain on lateral side of lower leg, pt reports he was previously medicated.      OBJECTIVE:            Transfers  Surface: From bed;Wheelchair;To bed  Additional Factors: Verbal cues;Hand placement cues;Increased time to complete  Device: Walker (no AD)  Sit to Stand  Assistance Level: Supervision  Skilled Clinical Factors: good technique and hand placement noted  Stand to Sit  Assistance Level: Stand by assist  Skilled Clinical Factors: vc's to reach back prior to sitting, improved safety this session  Bed To/From Chair  Technique: Stand pivot  Assistance Level: Supervision  Skilled Clinical Factors: wc <-> from bed, occasional cuing for activity pacing to improve safety as pt tends to rush when experiecing increased pain  Car Transfer  Assistance Level: Stand by assist  Skilled Clinical Factors: vc's for proper technique and sequencing with good carry over    Ambulation  Surface: Level surface  Device: Rolling walker  Distance: 80', 2 x30', 2 x25'  Activity: Within Unit  Activity Comments: improved tolerance to WB on RLE this session  Additional Factors: Verbal cues;Hand placement cues;Increased time to complete  Assistance Level: Stand by assist  Gait Deviations: Slow adonis;Decreased step length bilateral;Decreased weight shift right;Narrow base of  training completed this AM as noted above, pt able to complete 6\" stairs with SBA only and cues for proper sequencing with good carry over. Pt reports feeling proud that he was able to tolerate more activities this morning compared to previous sessions but hopes he won't be too tired in the afternoon.  Activity Tolerance: Patient tolerated treatment well    Goals:  Long Term Goals  Long Term Goal 1: Pt will demonstrate bed mobility indep  Long Term Goal 2: Pt will demonstrate transfers indep with safest AD  Long Term Goal 3: Pt will demonstrate amb 50-150ft indep with safest AD with proper gait mechanics  Long Term Goal 4: Pt will demonstrate stair negotiation 3 steps with 1 rail Kaleb  Long Term Goal 5: Pt will be indep with w/c mobility >150ft  Patient Goals   Patient Goals : decrease pain    PLAN OF CARE/Safety:   Safety Devices  Type of Devices: All fall risk precautions in place;Left in bed;Call light within reach;Bed alarm in place      Therapy Time:   Individual   Time In 0900   Time Out 1000   Minutes 60      Minutes: 60  Transfer/Bed mobility training: 15  Gait trainin  Neuro re education:0  Therapeutic ex: 15      SHAN GODINEZ PTA, 24 at 4:25 PM

## 2024-05-03 NOTE — PROGRESS NOTES
Subjective:  The patient complains of severe acute on chronic progressive fatigue and low back pain specifically right SI area partially relieved by rest, medications, ice, PT,  OT,   SLP and rest and exacerbated by recent surgery.  Patient is recovering from recent L4 or L5 microdiscectomy on the care Dr. 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  He has had increased groin pain and difficulty with mobility.  He is admitted  4/23/24 through the ER.     He was not able to ambulate well at home and had fallen.  He states that he injured his left foot when he fell and has some bruising in that area.  Stat x-rays of left foot and ankle 4/30/2024:  Left ankle: The mortise joint is preserved.  No radiopaque foreign body,  acute fracture or dislocation is seen.  A moderate-sized spur is seen on the  plantar aspect of the calcaneus and an enthesophyte is seen on the dorsal  aspect.  Tiny spur is also seen on the distal end of the tibia.     Left foot: Mild swelling is seen over the foot.  Spurring is seen on the  medial aspect of the head of the base of the 5th metatarsal.  Mild  degenerative changes are seen in the 1st MTP joint.  No periostitis is seen  to suggest stress fracture.  There are vascular calcifications seen in the  foot.      Initial findings look to be negative for fracture.     Recently he underwent sacroiliac joint injection on the right side -he initially stated that it helped a lot.  He however is still having severe pain in is hoping to titrate his pain medications I have reconsulted pain management and they are encouraging him to limit his reliance on high-dose opiates especially given the his comorbidities and risk for sleep apnea.  Recent nocturnal pulse ox was negative.  We are trialing him on steroids with caution to see if that helps his pain                  I am concerned about patient’s medical complexities and barriers to advancing in rehab goals including pain intolerance and uncontrolled diabetes.        I reviewed current care and plans for further care with other rehab providers including nursing and case management.  According to recent nursing note, \" met with pt, wife, and sister and discussed discharge date as well as goals. LSW discussed that pt may need a wheelchair for homegoing, however pt noted that he did ambulate today and is hopeful that he continues to make these gains. Pt's wife works as a  full time and will be back to work next week. They stressed the importance of pt's pain being managed so that he can function at home while she is working. HHC vs OP was discussed, pt and wife plan to discuss further and will follow up with LSW. Family training scheduled for Friday 5/3 at 9:30AM. \".    Steroids seem to be helping monitoring his blood sugars closely.    ROS x10:  The patient also complains of severely impaired mobility and activities of daily living.  Otherwise no new problems with vision, hearing, nose, mouth, throat, dermal, cardiovascular, GI, , pulmonary, musculoskeletal, psychiatric or neurological. See also Acute Rehab PM&R H&P.       Vital signs:  /77   Pulse 84   Temp 97.7 °F (36.5 °C) (Oral)   Resp 18   Ht 1.778 m (5' 10\")   Wt 127 kg (280 lb)   SpO2 97%   BMI

## 2024-05-03 NOTE — FLOWSHEET NOTE
Patient blood glucose was 363. MD notified. Insulin per Mar given. 23 units of humalog given by this nurse.

## 2024-05-03 NOTE — PROGRESS NOTES
OCCUPATIONAL THERAPY  INPATIENT REHAB TREATMENT NOTE  [facility]      NAME: Will Bravo  : 1960 (63 y.o.)  MRN: [id number]  CODE STATUS: Full Code  Room: R248/R248-01    Date of Service: 5/3/2024    Referring Physician: Dr Ruff  Rehab Diagnosis: Impaired mobility and ADL's due to severe lumbar stenosis    Hospital course:   Comments: Pt had L4-L5 microdiscectomy decompression on 2024 under the service of Dr. Kingston. Pain in his back has gradually worsening. Pain goes from rt groin down the rt leg. Pt wanting to be admitted to rehab for therapy due topain numbness and weakness down the right leg to the foot and difficulty walking . He has not worked with physical therapy secondary to pain.      Restrictions  Restrictions/Precautions  Restrictions/Precautions: Fall Risk, Weight Bearing   Lower Extremity Weight Bearing Restrictions  Left Lower Extremity Weight Bearing: Weight Bearing As Tolerated        Position Activity Restriction  Other position/activity restrictions: Limit excessive bending, lifting >10lb and twisting based on pain tolerance    Patient's date of birth confirmed: Yes    SAFETY:  Safety Devices  Type of devices: All fall risk precautions in place    SUBJECTIVE:     Pain at start of treatment: Yes: 7/10    Pain at end of treatment: Yes: 7/10    Location: R leg  Description sharp/ache  Nursing notified: Yes  PATEL Hsieh- to provide meds    OBJECTIVE:    ADL- shower completed. Wife present for training and participated in all tasks.      Feeding  Assistance Level: Independent  Grooming/Oral Hygiene  Assistance Level: Modified independent  Skilled Clinical Factors: seated at sink  Upper Extremity Bathing  Assistance Level: Modified independent  Lower Extremity Bathing  Assistance Level: Supervision  Skilled Clinical Factors: cues for safety  Upper Extremity Dressing  Assistance Level: Modified independent  Lower Extremity Dressing  Assistance Level: Stand by assist  Skilled      ASSESSMENT:  Activity Tolerance: Patient tolerated treatment well    Good ADL performance today -> continues to improve. Some cues for safety still needed     Family training completed- wife reports she feels comfortable assisting pt at current level of care     PLAN OF CARE:  Strengthening, Balance training, Functional mobility training, Neuromuscular re-education, Endurance training, Self-Care / ADL, Home management training, Patient/Caregiver education & training, Safety education & training  continue with OT plan of care    Patient goals : Mot stated at this time  Time Frame for Long Term Goals : Pt within 1 1/2 weeks of evaluation will demonstarte progress to treatment goals as stated on initial evaluation to address areas of deficit as stated below.  Long Term Goal 1: Pt will increase independence with ADL self care  Long Term Goal 2: Pt will increase independence with ADL transfers  Long Term Goal 3: Pt will increase tolerance for acts completion    Therapy Time:   Individual Group Co-Treat   Time In 1030       Time Out 1130         Minutes 60             ADL/IADL trainin minutes  Therapeutic activities: 25 minutes     Electronically signed by:    Dominik Clement OT,   5/3/2024, 11:22 AM

## 2024-05-03 NOTE — PLAN OF CARE
Problem: Safety - Adult  Goal: Free from fall injury  5/3/2024 1116 by Citlali Barrow RN  Outcome: Progressing  5/2/2024 2224 by Elizabeth Yee RN  Outcome: Progressing     Problem: Pain  Goal: Verbalizes/displays adequate comfort level or baseline comfort level  5/3/2024 1116 by Citlali Barrow RN  Outcome: Progressing  5/2/2024 2224 by Elizabeth Yee RN  Outcome: Progressing

## 2024-05-03 NOTE — PROGRESS NOTES
hours.    No results for input(s): \"NA\", \"K\", \"CL\", \"CO2\", \"BUN\", \"CREATININE\", \"CALCIUM\", \"PHOS\" in the last 72 hours.    Invalid input(s): \"MAGNES\"    No results for input(s): \"AST\", \"ALT\", \"BILIDIR\", \"BILITOT\", \"ALKPHOS\" in the last 72 hours.  No results for input(s): \"INR\" in the last 72 hours.  No results for input(s): \"CKTOTAL\", \"TROPONINI\" in the last 72 hours.    Urinalysis:    No results found for: \"NITRU\", \"WBCUA\", \"BACTERIA\", \"RBCUA\", \"BLOODU\", \"SPECGRAV\", \"GLUCOSEU\"    Radiology:  XR FOOT LEFT (MIN 3 VIEWS)   Final Result      XR ANKLE LEFT (MIN 3 VIEWS)   Final Result          Assessment/Plan:    Active Hospital Problems    Diagnosis Date Noted    Impaired mobility and ADLs [Z74.09, Z78.9] 04/30/2024    Lumbar post-laminectomy syndrome [M96.1] 04/30/2024    Intractable back pain [M54.9] 04/29/2024    Acute post-operative pain [G89.18] 04/28/2024    Back spasm [M62.830] 04/26/2024    Impaired mobility and activities of daily living dt  L4-5 MICRODISECTOMY DECOMPRESSION (Spine Lumbar) [Z74.09, Z78.9] 04/22/2024    Poorly controlled diabetes mellitus (HCC) [E11.65] 04/19/2024    Class 3 severe obesity due to excess calories with serious comorbidity and body mass index (BMI) of 40.0 to 44.9 in adult (HCC) [E66.01, Z68.41] 04/18/2024    Situational depression [F43.21] 11/14/2022    Metabolic syndrome [E88.810] 03/17/2022    Diabetic nephropathy associated with type 2 diabetes mellitus (HCC) [E11.21] 03/04/2022    Cirrhosis of liver without ascites (HCC) [K74.60] 11/10/2020    Chronic pain of right knee [M25.561, G89.29] 02/11/2020    Displacement of lumbar intervertebral disc without myelopathy [M51.26] 07/21/2015    Dyslipidemia [E78.5] 02/05/2015    Essential hypertension [I10] 02/05/2015    Reflux esophagitis [K21.00] 02/05/2015    Diabetes mellitus type 2, insulin dependent (HCC) [E11.9, Z79.4] 10/13/2011        Principal Problem:    Impaired mobility and activities of daily living dt  L4-5  MICRODISECTOMY DECOMPRESSION (Spine Lumbar)     Active Problems:    Diabetes mellitus type 2, insulin dependent (HCC)    Displacement of lumbar intervertebral disc without myelopathy    Class 3 severe obesity due to excess calories with serious comorbidity and body mass index (BMI) of 40.0 to 44.9 in adult (HCC)    Diabetic nephropathy associated with type 2 diabetes mellitus (HCC)    Dyslipidemia    Essential hypertension    Reflux esophagitis    Situational depression    Acute post-operative pain    Intractable back pain    Impaired mobility and ADLs     Plan:     Pain management   Review and adjust/add medications as needed  Correction insulin with goal -180; AC 3 times daily and nightly  Lantus 30 units at night  Continue/hold home medications as ordered after medical history completed  PT/OT/rehab  VS per unit routine  Up with assist  Diet regular  Continue monitoring blood counts, liver function, kidney function  With hypoglycemia treatment protocol  Fall precautions  Monitor intake and output  Weekly weight     > On Lovenox for DVT/PE prophylaxis     Consult to dietitian  Consult to endocrinology  Consult to neurosurgery  Consult to pain management  Consult to psychology  Consult to recreation therapy  Consult to   Consult to case management    Additional work up or/and treatment plan may be added today or then after based on clinical progression. I am managing a portion of pt care. Some medical issues are handled by other specialists. Additional work up and treatment should be done in out pt setting by pt PCP and other out pt providers.      In addition to examining and evaluating pt, I spent additional time explaining care, normal/abnormal findings and treatment plan, reviewing patient's chart and adjusting/ reconciling medications. All of pt questions were answered. Counseling, diet and education were  provided. Case will be discussed with nursing staff when appropriate. Family will be

## 2024-05-03 NOTE — PROGRESS NOTES
Department of Chronic Pain Managment  Attending Progress Note      SUBJECTIVE  Low back pain    OBJECTIVE: Pt c/o Low back and Rt Leg pain 3-4 week duration evaluated by surgeon and Had Lumbar Decompression few days ago on 10mg Oxycodone q 4hrs Prn and TID RTC says he is still in pain .    Medications  Current Facility-Administered Medications: predniSONE (DELTASONE) tablet 20 mg, 20 mg, Oral, Daily  cyclobenzaprine (FLEXERIL) tablet 10 mg, 10 mg, Oral, TID  insulin lispro (HUMALOG) injection vial 15 Units, 15 Units, SubCUTAneous, TID WC  insulin glargine (LANTUS) injection vial 30 Units, 30 Units, SubCUTAneous, QAM  insulin glargine (LANTUS) injection vial 50 Units, 50 Units, SubCUTAneous, Nightly  Vitamin D (CHOLECALCIFEROL) tablet 2,000 Units, 2,000 Units, Oral, Dinner  cyanocobalamin injection 1,000 mcg, 1,000 mcg, IntraMUSCular, Weekly  coenzyme Q10 capsule 100 mg, 100 mg, Oral, Daily  lidocaine 4 % external patch 3 patch, 3 patch, TransDERmal, Daily  acetaminophen (TYLENOL) tablet 650 mg, 650 mg, Oral, Q4H PRN  bisacodyl (DULCOLAX) suppository 10 mg, 10 mg, Rectal, Daily PRN  sodium phosphate (FLEET) rectal enema 1 enema, 1 enema, Rectal, Daily PRN  analgesic ointment ointment, , Topical, TID  gabapentin (NEURONTIN) capsule 600 mg, 600 mg, Oral, 4x Daily  oxyCODONE (OXYCONTIN) extended release tablet 10 mg, 10 mg, Oral, Q6H  sodium chloride flush 0.9 % injection 5-40 mL, 5-40 mL, IntraVENous, 2 times per day  sodium chloride flush 0.9 % injection 5-40 mL, 5-40 mL, IntraVENous, PRN  0.9 % sodium chloride infusion, , IntraVENous, PRN  potassium chloride (KLOR-CON M) extended release tablet 40 mEq, 40 mEq, Oral, PRN **OR** potassium bicarb-citric acid (EFFER-K) effervescent tablet 40 mEq, 40 mEq, Oral, PRN **OR** potassium chloride 10 mEq/100 mL IVPB (Peripheral Line), 10 mEq, IntraVENous, PRN  magnesium sulfate 2000 mg in 50 mL IVPB premix, 2,000 mg, IntraVENous, PRN  enoxaparin Sodium (LOVENOX) injection 30 mg,

## 2024-05-03 NOTE — PROGRESS NOTES
OCCUPATIONAL THERAPY  INPATIENT REHAB TREATMENT NOTE  [facility]      NAME: Will Bravo  : 1960 (63 y.o.)  MRN: [id number]  CODE STATUS: Full Code  Room: R248/R248-01    Date of Service: 5/3/2024    Referring Physician: Dr Ruff  Rehab Diagnosis: Impaired mobility and ADL's due to severe lumbar stenosis    Hospital course:   Comments: Pt had L4-L5 microdiscectomy decompression on 2024 under the service of Dr. Kingston. Pain in his back has gradually worsening. Pain goes from rt groin down the rt leg. Pt wanting to be admitted to rehab for therapy due topain numbness and weakness down the right leg to the foot and difficulty walking . He has not worked with physical therapy secondary to pain.    Restrictions  Restrictions/Precautions  Restrictions/Precautions: Fall Risk, Weight Bearing   Lower Extremity Weight Bearing Restrictions  Left Lower Extremity Weight Bearing: Weight Bearing As Tolerated        Position Activity Restriction  Other position/activity restrictions: Limit excessive bending, lifting >10lb and twisting based on pain tolerance    Patient's date of birth confirmed: Yes    SAFETY:  Safety Devices  Type of devices: All fall risk precautions in place    SUBJECTIVE:   \"I think I walked too much this morning.....and the shower \" (pt discussing pain)    Pain at start of treatment: Yes: 7/10    Pain at end of treatment: Yes: 7/10    Location: R leg  Description ache  Nursing notified: Declined  Pt reports prev medicated    OBJECTIVE:    While seated, challenged strength, activity tolerance, ROM, and flexibility for improved ADL performance.    Finished going through BUE HEP handout from AM session. Improved ability from this AM    Challenged pt through usage of 2# weight to complete BUE exercises. 1 sets x 20 reps completed. Exercises focused on all UE joints and planes of motion including scapular protraction/retraction, shoulder flexion/extension/rotation/horizontal abduction,

## 2024-05-04 VITALS
BODY MASS INDEX: 40.09 KG/M2 | TEMPERATURE: 98.1 F | RESPIRATION RATE: 18 BRPM | WEIGHT: 280 LBS | SYSTOLIC BLOOD PRESSURE: 135 MMHG | OXYGEN SATURATION: 96 % | DIASTOLIC BLOOD PRESSURE: 77 MMHG | HEIGHT: 70 IN | HEART RATE: 92 BPM

## 2024-05-04 LAB
ANION GAP SERPL CALCULATED.3IONS-SCNC: 12 MEQ/L (ref 9–15)
BASOPHILS # BLD: 0 K/UL (ref 0–0.2)
BASOPHILS NFR BLD: 0.2 %
BUN SERPL-MCNC: 30 MG/DL (ref 8–23)
CALCIUM SERPL-MCNC: 9.7 MG/DL (ref 8.5–9.9)
CHLORIDE SERPL-SCNC: 95 MEQ/L (ref 95–107)
CO2 SERPL-SCNC: 29 MEQ/L (ref 20–31)
CREAT SERPL-MCNC: 1.08 MG/DL (ref 0.7–1.2)
EOSINOPHIL # BLD: 0.1 K/UL (ref 0–0.7)
EOSINOPHIL NFR BLD: 1.3 %
ERYTHROCYTE [DISTWIDTH] IN BLOOD BY AUTOMATED COUNT: 13 % (ref 11.5–14.5)
GLUCOSE BLD-MCNC: 215 MG/DL (ref 70–99)
GLUCOSE BLD-MCNC: 311 MG/DL (ref 70–99)
GLUCOSE SERPL-MCNC: 289 MG/DL (ref 70–99)
HCT VFR BLD AUTO: 33 % (ref 42–52)
HGB BLD-MCNC: 11.3 G/DL (ref 14–18)
LYMPHOCYTES # BLD: 3.4 K/UL (ref 1–4.8)
LYMPHOCYTES NFR BLD: 39 %
MCH RBC QN AUTO: 30.1 PG (ref 27–31.3)
MCHC RBC AUTO-ENTMCNC: 34.2 % (ref 33–37)
MCV RBC AUTO: 87.8 FL (ref 79–92.2)
MONOCYTES # BLD: 0.7 K/UL (ref 0.2–0.8)
MONOCYTES NFR BLD: 7.8 %
NEUTROPHILS # BLD: 4.5 K/UL (ref 1.4–6.5)
NEUTS SEG NFR BLD: 51.5 %
PERFORMED ON: ABNORMAL
PERFORMED ON: ABNORMAL
PLATELET # BLD AUTO: 264 K/UL (ref 130–400)
POTASSIUM SERPL-SCNC: 4.7 MEQ/L (ref 3.4–4.9)
RBC # BLD AUTO: 3.76 M/UL (ref 4.7–6.1)
SODIUM SERPL-SCNC: 136 MEQ/L (ref 135–144)
WBC # BLD AUTO: 8.8 K/UL (ref 4.8–10.8)

## 2024-05-04 PROCEDURE — 6370000000 HC RX 637 (ALT 250 FOR IP): Performed by: PAIN MEDICINE

## 2024-05-04 PROCEDURE — 80048 BASIC METABOLIC PNL TOTAL CA: CPT

## 2024-05-04 PROCEDURE — 97116 GAIT TRAINING THERAPY: CPT

## 2024-05-04 PROCEDURE — 99239 HOSP IP/OBS DSCHRG MGMT >30: CPT | Performed by: PHYSICAL MEDICINE & REHABILITATION

## 2024-05-04 PROCEDURE — 85025 COMPLETE CBC W/AUTO DIFF WBC: CPT

## 2024-05-04 PROCEDURE — 6370000000 HC RX 637 (ALT 250 FOR IP): Performed by: PHYSICAL MEDICINE & REHABILITATION

## 2024-05-04 PROCEDURE — 36415 COLL VENOUS BLD VENIPUNCTURE: CPT

## 2024-05-04 PROCEDURE — 6370000000 HC RX 637 (ALT 250 FOR IP): Performed by: PHYSICIAN ASSISTANT

## 2024-05-04 PROCEDURE — 6360000002 HC RX W HCPCS: Performed by: INTERNAL MEDICINE

## 2024-05-04 PROCEDURE — 97530 THERAPEUTIC ACTIVITIES: CPT

## 2024-05-04 PROCEDURE — 6370000000 HC RX 637 (ALT 250 FOR IP): Performed by: INTERNAL MEDICINE

## 2024-05-04 PROCEDURE — 6370000000 HC RX 637 (ALT 250 FOR IP)

## 2024-05-04 PROCEDURE — 97110 THERAPEUTIC EXERCISES: CPT

## 2024-05-04 PROCEDURE — 97535 SELF CARE MNGMENT TRAINING: CPT

## 2024-05-04 RX ORDER — PREDNISONE 20 MG/1
20 TABLET ORAL DAILY
Qty: 10 TABLET | Refills: 0 | Status: SHIPPED | OUTPATIENT
Start: 2024-05-05 | End: 2024-05-15

## 2024-05-04 RX ORDER — GABAPENTIN 300 MG/1
600 CAPSULE ORAL 4 TIMES DAILY
Qty: 240 CAPSULE | Refills: 0 | Status: SHIPPED | OUTPATIENT
Start: 2024-05-04 | End: 2024-06-03

## 2024-05-04 RX ORDER — AMLODIPINE BESYLATE 10 MG/1
10 TABLET ORAL DAILY
Qty: 30 TABLET | Refills: 3 | Status: SHIPPED | OUTPATIENT
Start: 2024-05-05

## 2024-05-04 RX ORDER — CHOLECALCIFEROL (VITAMIN D3) 50 MCG
2000 TABLET ORAL
Qty: 60 TABLET | Refills: 5 | Status: SHIPPED | OUTPATIENT
Start: 2024-05-04

## 2024-05-04 RX ORDER — LOSARTAN POTASSIUM 100 MG/1
100 TABLET ORAL DAILY
Qty: 30 TABLET | Refills: 3 | Status: SHIPPED | OUTPATIENT
Start: 2024-05-05

## 2024-05-04 RX ADMIN — OXYCODONE HYDROCHLORIDE 10 MG: 10 TABLET, FILM COATED, EXTENDED RELEASE ORAL at 08:41

## 2024-05-04 RX ADMIN — ACETAMINOPHEN 650 MG: 325 TABLET ORAL at 06:30

## 2024-05-04 RX ADMIN — INSULIN GLARGINE 40 UNITS: 100 INJECTION, SOLUTION SUBCUTANEOUS at 08:45

## 2024-05-04 RX ADMIN — OXYCODONE HYDROCHLORIDE 10 MG: 10 TABLET, FILM COATED, EXTENDED RELEASE ORAL at 14:45

## 2024-05-04 RX ADMIN — PREDNISONE 20 MG: 20 TABLET ORAL at 08:33

## 2024-05-04 RX ADMIN — INSULIN LISPRO 6 UNITS: 100 INJECTION, SOLUTION INTRAVENOUS; SUBCUTANEOUS at 08:45

## 2024-05-04 RX ADMIN — INSULIN LISPRO 15 UNITS: 100 INJECTION, SOLUTION INTRAVENOUS; SUBCUTANEOUS at 14:49

## 2024-05-04 RX ADMIN — ACETAMINOPHEN 650 MG: 325 TABLET ORAL at 02:54

## 2024-05-04 RX ADMIN — FENOFIBRATE 54 MG: 54 TABLET ORAL at 08:33

## 2024-05-04 RX ADMIN — HYDROCHLOROTHIAZIDE 25 MG: 25 TABLET ORAL at 06:30

## 2024-05-04 RX ADMIN — CYCLOBENZAPRINE HYDROCHLORIDE 10 MG: 10 TABLET, FILM COATED ORAL at 14:45

## 2024-05-04 RX ADMIN — Medication 100 MG: at 08:34

## 2024-05-04 RX ADMIN — Medication 2000 UNITS: at 16:23

## 2024-05-04 RX ADMIN — OXYCODONE HYDROCHLORIDE 10 MG: 10 TABLET, FILM COATED, EXTENDED RELEASE ORAL at 02:55

## 2024-05-04 RX ADMIN — BISACODYL 5 MG: 5 TABLET, COATED ORAL at 08:34

## 2024-05-04 RX ADMIN — ENOXAPARIN SODIUM 30 MG: 100 INJECTION SUBCUTANEOUS at 08:43

## 2024-05-04 RX ADMIN — GABAPENTIN 600 MG: 300 CAPSULE ORAL at 14:45

## 2024-05-04 RX ADMIN — AMLODIPINE BESYLATE 10 MG: 10 TABLET ORAL at 08:36

## 2024-05-04 RX ADMIN — MENTHOL AND METHYL SALICYLATE: 7.6; 29 OINTMENT TOPICAL at 08:53

## 2024-05-04 RX ADMIN — GABAPENTIN 600 MG: 300 CAPSULE ORAL at 08:33

## 2024-05-04 RX ADMIN — PANTOPRAZOLE SODIUM 40 MG: 40 TABLET, DELAYED RELEASE ORAL at 06:30

## 2024-05-04 RX ADMIN — INSULIN LISPRO 15 UNITS: 100 INJECTION, SOLUTION INTRAVENOUS; SUBCUTANEOUS at 08:46

## 2024-05-04 RX ADMIN — INSULIN LISPRO 2 UNITS: 100 INJECTION, SOLUTION INTRAVENOUS; SUBCUTANEOUS at 14:46

## 2024-05-04 RX ADMIN — CARVEDILOL 6.25 MG: 6.25 TABLET, FILM COATED ORAL at 08:37

## 2024-05-04 RX ADMIN — CYCLOBENZAPRINE HYDROCHLORIDE 10 MG: 10 TABLET, FILM COATED ORAL at 08:35

## 2024-05-04 RX ADMIN — LOSARTAN POTASSIUM 100 MG: 50 TABLET, FILM COATED ORAL at 08:37

## 2024-05-04 ASSESSMENT — PAIN DESCRIPTION - ORIENTATION
ORIENTATION: RIGHT

## 2024-05-04 ASSESSMENT — PAIN DESCRIPTION - DESCRIPTORS
DESCRIPTORS: ACHING
DESCRIPTORS: DISCOMFORT
DESCRIPTORS: ACHING
DESCRIPTORS: STABBING
DESCRIPTORS: ACHING

## 2024-05-04 ASSESSMENT — PAIN SCALES - GENERAL
PAINLEVEL_OUTOF10: 8
PAINLEVEL_OUTOF10: 8
PAINLEVEL_OUTOF10: 7
PAINLEVEL_OUTOF10: 2
PAINLEVEL_OUTOF10: 10

## 2024-05-04 ASSESSMENT — PAIN DESCRIPTION - LOCATION
LOCATION: LEG
LOCATION: LEG;BACK
LOCATION: BACK

## 2024-05-04 NOTE — CARE COORDINATION
[facility] order placed. Called weekend [facility] to notify them of new referral. Electronically signed by Rajni Taylor RN on 5/4/24 at 1:05 PM EDT

## 2024-05-04 NOTE — DISCHARGE INSTR - DIET
Good nutrition is important when healing from an illness, injury, or surgery.  Follow any nutrition recommendations given to you during your hospital stay.   If you were given an oral nutrition supplement while in the hospital, continue to take this supplement at home.  You can take it with meals, in-between meals, and/or before bedtime. These supplements can be purchased at most local grocery stores, pharmacies, and chain [employer]-stores.   If you have any questions about your diet or nutrition, call the hospital and ask for the dietitian.    4 carb no added salt diet

## 2024-05-04 NOTE — PROGRESS NOTES
OCCUPATIONAL THERAPY  INPATIENT REHAB TREATMENT NOTE  [facility]      NAME: Will Bravo  : 1960 (63 y.o.)  MRN: [id number]  CODE STATUS: Full Code  Room: R248/R248-01    Date of Service: 2024    Referring Physician: Dr Ruff  Rehab Diagnosis: Impaired mobility and ADL's due to severe lumbar stenosis    Hospital course:   Comments: Pt had L4-L5 microdiscectomy decompression on 2024 under the service of Dr. Kingston. Pain in his back has gradually worsening. Pain goes from rt groin down the rt leg. Pt wanting to be admitted to rehab for therapy due topain numbness and weakness down the right leg to the foot and difficulty walking . He has not worked with physical therapy secondary to pain.      Restrictions  Restrictions/Precautions  Restrictions/Precautions: Fall Risk, Weight Bearing   Lower Extremity Weight Bearing Restrictions  Left Lower Extremity Weight Bearing: Weight Bearing As Tolerated        Position Activity Restriction  Other position/activity restrictions: Limit excessive bending, lifting >10lb and twisting based on pain tolerance    Patient's date of birth confirmed: Yes    SAFETY:  Safety Devices  Safety Devices in place: Yes  Type of devices: All fall risk precautions in place    SUBJECTIVE:       Pain at start of treatment: Yes: 1/10 post sitting up some toward EOB and iced it, was a 7/10.    Pain at end of treatment: Yes: 2/10    Location: lower lateral area of R glute, through lateral/mid quad and R lower  leg mid tibia and lateral gastroc  Description contstant with sharp pain and times it only aches  Nursing notified: Yes  RN: Patricia  Intervention: RN provided pain medication    COGNITION:  Orientation  Overall Orientation Status: Within Normal Limits  Orientation Level: Oriented X4  Cognition  Overall Cognitive Status: WFL  Arousal/Alertness: Appropriate responses to stimuli  Following Commands: Follows one step commands consistently  Memory: Appears intact  Safety

## 2024-05-04 NOTE — PROGRESS NOTES
Patient requesting to go home today, per LPN was walking around without assistance in room. Rehab MD agrees with discharge. Awaiting for pain management to reconcile meds. Did speak with MD on phone earlier whom stated patient should have Gabapentin and Oxycontin available at home still. Wife confirmed this. Electronically signed by Malathi Bynum RN on 5/4/24 at 2:43 PM EDT

## 2024-05-04 NOTE — PLAN OF CARE
Problem: Safety - Adult  Goal: Free from fall injury  5/3/2024 2251 by Elizabeth Yee RN  Outcome: Progressing  5/3/2024 1116 by Citlali Barrow RN  Outcome: Progressing     Problem: Discharge Planning  Goal: Discharge to home or other facility with appropriate resources  Outcome: Progressing     Problem: Pain  Goal: Verbalizes/displays adequate comfort level or baseline comfort level  5/3/2024 2251 by Elizabeth Yee RN  Outcome: Progressing  5/3/2024 1116 by Citlali Barrow RN  Outcome: Progressing     Problem: ABCDS Injury Assessment  Goal: Absence of physical injury  Outcome: Progressing     Problem: Chronic Conditions and Co-morbidities  Goal: Patient's chronic conditions and co-morbidity symptoms are monitored and maintained or improved  Outcome: Progressing

## 2024-05-04 NOTE — PROGRESS NOTES
Physical Therapy Rehab Treatment Note  [facility]ility[facility]Department[facility]  [facility]       NAME: Will Bravo  : 1960 (63 y.o.)  MRN: [id number]  CODE STATUS: Full Code    Date of Service: 2024       Restrictions:  Restrictions/Precautions: Fall Risk, Weight Bearing  Lower Extremity Weight Bearing Restrictions  Left Lower Extremity Weight Bearing: Weight Bearing As Tolerated  Position Activity Restriction  Other position/activity restrictions: Limit excessive bending, lifting >10lb and twisting based on pain tolerance     SUBJECTIVE:   Subjective: Patient states he had pain last night and now his legs feel more sensitive.States he made DR uRff aware this am.  Wife present for tx    Pain  Pain: 7/10 groin pain    OBJECTIVE:         Bed mobility  Rolling to Left: Modified independent  Rolling to Right: Modified independent  Supine to Sit: Modified independent  Sit to Supine: Modified independent  Scooting: Modified independent  Bed Mobility Comments: hob flat, no bedrails used, incrased time and effort    Transfers  Sit to Stand: Supervision  Stand to Sit: Supervision  Bed to Chair: Supervision  Comment: ww, incrased time and effort, completed with good carryover after vc for safe approach to chair with ww    Ambulation  WB Status: wbat  Ambulation  Surface: Level tile  Device: Rolling Walker  Assistance: Stand by assistance;Supervision  Quality of Gait: rt le er > left, decrased wbing on rle, continued overall flexed posture    Stairs/Curb  Stairs?: Yes  Stairs  # Steps : 4  Stairs Height: 6\"  Rails: Bilateral  Assistance: Supervision;Stand by assistance  Comment: close sba/ supervision dt increased pain this sesson    PT Exercises  Exercise Treatment: seated hamstring stretch 30 secon x3 musa, musa piriformis 30 seconds x3  A/AROM Exercises: seated laq, march x20 - dls 0, standing hr, hip ext x10 each bue support      Manual: gentle stm to right IT band, patient not tolerated, gentle leg  pulls, patient not tolerated          ASSESSMENT/PROGRESS TOWARDS GOALS:   Body Structures, Functions, Activity Limitations Requiring Skilled Therapeutic Intervention: Decreased functional mobility ;Decreased ROM;Decreased strength;Decreased endurance;Decreased balance;Increased pain;Decreased body mechanics;Decreased posture  Assessment: Patient completed gair, stair and transfer training to ensure maximum safety and technique this session. Patient cont to be limited by pain and requires several rest breaks. HEP initiated for seated, standing and stretching ble's.    Goals:  Long Term Goals  Long Term Goal 1: Pt will demonstrate bed mobility indep  Long Term Goal 2: Pt will demonstrate transfers indep with safest AD  Long Term Goal 3: Pt will demonstrate amb 50-150ft indep with safest AD with proper gait mechanics  Long Term Goal 4: Pt will demonstrate stair negotiation 3 steps with 1 rail Kaleb  Long Term Goal 5: Pt will be indep with w/c mobility >150ft  Patient Goals   Patient Goals : decrease pain    PLAN OF CARE/Safety:      All precautions in place    Therapy Time:   Individual   Time In 0930   Time Out 1030   Minutes 60      Minutes:60  Transfer/Bed mobility training:15  Gait training:15  Neuro re education:0  Therapeutic ex:25  Manual x5 min      Rae Alvarado PTA, 05/04/24 at 12:32 PM

## 2024-05-04 NOTE — PLAN OF CARE
Problem: Safety - Adult  Goal: Free from fall injury  5/4/2024 1035 by Malathi Bynum RN  Outcome: Progressing  5/3/2024 2251 by Elizabeth Yee RN  Outcome: Progressing     Problem: Discharge Planning  Goal: Discharge to home or other facility with appropriate resources  5/4/2024 1035 by Malathi Bynum RN  Outcome: Progressing  5/3/2024 2251 by Elizabeth Yee RN  Outcome: Progressing     Problem: Pain  Goal: Verbalizes/displays adequate comfort level or baseline comfort level  5/4/2024 1035 by Malathi Bynum RN  Outcome: Progressing  5/3/2024 2251 by Elizabeth Yee RN  Outcome: Progressing     Problem: ABCDS Injury Assessment  Goal: Absence of physical injury  5/4/2024 1035 by Malathi Bynum RN  Outcome: Progressing  5/3/2024 2251 by Elizabeth Yee RN  Outcome: Progressing     Problem: Chronic Conditions and Co-morbidities  Goal: Patient's chronic conditions and co-morbidity symptoms are monitored and maintained or improved  5/4/2024 1035 by Malathi Bynum RN  Outcome: Progressing  5/3/2024 2251 by Elizabeth Yee RN  Outcome: Progressing

## 2024-05-04 NOTE — PROGRESS NOTES
Patient was referred for evaluation of possible depression and chronic pain. According to the Chart Review, he was diagnosed in 4/22 with a \"mild episode of major depressive disorder\" through [facility] ([facility]). (I couldn't access the actual progress note.)    I met with the patient in his room with wife present. He denied feeling depressed or anxious. He said that his main concern was managing his pain. He has been taking Oycontin, 10mg, every six hours, for the past several days. He has taken two doses of Oxycontin today. The PT notes reported that he has been having difficulty tolerating activities, e.g., \"Pt unable to attempt any out of chair activities this afternoon due to increased pain. Rest breaks taken between all seated exercises to recover from pain and ice RLE, pt does report some relief from ice pack and resting (5/3)\".    I spoke with the patient and his wife, and both wanted him to continue taking the Oxycontin after he was discharged. They said that the medication helps him. Will was cognizant of the potential for him to become dependent on pain meds.    A/P- It's difficult to determine the extent of depression or anxiety that Will is experiencing currently due to the fact that he has been taking pain meds regularly for the past several days. These meds would likely dampen or reduce his emotional distress.    I would recommend that he be followed by the pain management physician. If he does not progress and gradually decrease the use of Oxycontin, then he should be referred to a psychologist and/or psychiatrist.

## 2024-05-04 NOTE — PROGRESS NOTES
Physical Therapy Rehab Treatment Note  Facility/Department: [facility]  Room: R248/R248-01       NAME: Will Bravo  : 1960 (63 y.o.)  MRN: [id number]  CODE STATUS: Full Code    Date of Service: 2024     Restrictions:  Restrictions/Precautions: Fall Risk, Weight Bearing  Lower Extremity Weight Bearing Restrictions  Left Lower Extremity Weight Bearing: Weight Bearing As Tolerated  Position Activity Restriction  Other position/activity restrictions: Limit excessive bending, lifting >10lb and twisting based on pain tolerance    SUBJECTIVE:   Subjective: Patient states he had pain last night and now his legs feel more sensitive.    Pain  Pain: 7/10 groin pain pre and post, states recently medicated    OBJECTIVE:      Review of HEP printout, patient questions answered. Patient education for slow return to daily tasks. CP use at home 15 min on 15 off. Reviewed body mechanics and back health/safety     ASSESSMENT/PROGRESS TOWARDS GOALS:   Body Structures, Functions, Activity Limitations Requiring Skilled Therapeutic Intervention: Decreased functional mobility ;Decreased ROM;Decreased strength;Decreased endurance;Decreased balance;Increased pain;Decreased body mechanics;Decreased posture  Assessment: Patient education for HEP, energy conservaton, returning to home responsibilities and safety while healing.    Goals:  Long Term Goals  Long Term Goal 1: Pt will demonstrate bed mobility indep  Long Term Goal 2: Pt will demonstrate transfers indep with safest AD  Long Term Goal 3: Pt will demonstrate amb 50-150ft indep with safest AD with proper gait mechanics  Long Term Goal 4: Pt will demonstrate stair negotiation 3 steps with 1 rail Kaleb  Long Term Goal 5: Pt will be indep with w/c mobility >150ft  Patient Goals   Patient Goals : decrease pain    PLAN OF CARE/Safety:    All precautions in place      Therapy Time:   Individual   Time In 1300   Time Out 1315   Minutes 15      Minutes:15  Transfer/Bed mobility

## 2024-05-04 NOTE — PLAN OF CARE
Problem: Safety - Adult  Goal: Free from fall injury  5/4/2024 1616 by Malathi Bynum RN  Outcome: Completed  5/4/2024 1035 by Malathi Bynum RN  Outcome: Progressing     Problem: Discharge Planning  Goal: Discharge to home or other facility with appropriate resources  5/4/2024 1616 by Malathi Bynum RN  Outcome: Completed  5/4/2024 1035 by Malathi Bynum RN  Outcome: Progressing     Problem: Pain  Goal: Verbalizes/displays adequate comfort level or baseline comfort level  5/4/2024 1616 by Malathi Bynum RN  Outcome: Completed  5/4/2024 1035 by Malathi Bynum RN  Outcome: Progressing     Problem: ABCDS Injury Assessment  Goal: Absence of physical injury  5/4/2024 1616 by Malathi Bynum RN  Outcome: Completed  5/4/2024 1035 by Malathi Bynum RN  Outcome: Progressing     Problem: Chronic Conditions and Co-morbidities  Goal: Patient's chronic conditions and co-morbidity symptoms are monitored and maintained or improved  5/4/2024 1616 by Malathi Bynum RN  Outcome: Completed  5/4/2024 1035 by Malathi Bynum RN  Outcome: Progressing

## 2024-05-04 NOTE — PROGRESS NOTES
OCCUPATIONAL THERAPY  INPATIENT REHAB TREATMENT NOTE  [facility]      NAME: Will Bravo  : 1960 (63 y.o.)  MRN: [id number]  CODE STATUS: Full Code  Room: R248/R248-01    Date of Service: 2024    Referring Physician: Dr Ruff  Rehab Diagnosis: Impaired mobility and ADL's due to severe lumbar stenosis    Hospital course:   Comments: Pt had L4-L5 microdiscectomy decompression on 2024 under the service of Dr. Kingston. Pain in his back has gradually worsening. Pain goes from rt groin down the rt leg. Pt wanting to be admitted to rehab for therapy due topain numbness and weakness down the right leg to the foot and difficulty walking . He has not worked with physical therapy secondary to pain.      Restrictions  Restrictions/Precautions  Restrictions/Precautions: Fall Risk, Weight Bearing   Lower Extremity Weight Bearing Restrictions  Left Lower Extremity Weight Bearing: Weight Bearing As Tolerated  Position Activity Restriction  Other position/activity restrictions: Limit excessive bending, lifting >10lb and twisting based on pain tolerance    Patient's date of birth confirmed: Yes    SAFETY:  Safety Devices  Safety Devices in place: Yes  Type of devices: All fall risk precautions in place    SUBJECTIVE:    Pain at start of treatment: Yes: 9/10    Pain at end of treatment: Yes: 9/10    Location: Groin  Nursing notified: Declined  Intervention: Cold applied Repositioned  Pt reports that he had pain medication. Ice pack applied x20 minutes.         OBJECTIVE: Offered full ADL shower session, however, pt declined. Pt agreeable to going to therapy gym to participate in acts while seated at tabletop. Pt completed as follows.     UE Function   Socks: Pt wore 1# wrist weights and used bilateral hands to retrieve socks that were spread out across the tabletop. Pt matched/folded socks and placed them in a basket placed on the floor to the left of him. Pt had Min difficulty with activity and worked at a

## 2024-05-04 NOTE — DISCHARGE INSTR - COC
Continuity of Care Form    Patient Name: Will Bravo   :  1960  MRN:  [id number]    Admit date:  2024  Discharge date:  2024    Code Status Order: Full Code   Advance Directives:     Admitting Physician:  Charlene Ruff DO  PCP: Magaly Nolna MD    Discharging Nurse: Ratna Mujica LPN  Discharging Hospital Unit/Room#: R248/R248-01  Discharging Unit Phone Number: [phone]    Emergency Contact:   Extended Emergency Contact Information  Primary Emergency Contact: Leatha Agnel  Address: [address] [place], [place] [address] [facility]  Home Phone: [phone]  Relation: Other  Secondary Emergency Contact: elizabeth bravo  Mobile Phone: [phone]  Relation: Spouse    Past Surgical History:  Past Surgical History:   Procedure Laterality Date    CT CRYO ABLATION RENAL TUMOR  2019    CT CRYO ABLATION RENAL TUMOR    KNEE ARTHROSCOPY Left     [facility]    LAMINECTOMY N/A 2024    L4-5 MICRODISECTOMY DECOMPRESSION performed by Alda Kingston MD at [facility] OR    PAIN MANAGEMENT PROCEDURE Right 2024    SI Joint injection vs Transforaminal Epidural steroid injection.Right side under fluroscopy performed by Jorge Tyler MD at [facility] OR    SKIN GRAFT Right     [facility], RIGHT LEG    SPINE SURGERY  2015    LUMBAR    TONSILLECTOMY         Immunization History:   Immunization History   Administered Date(s) Administered    COVID-19, PFIZER PURPLE top, DILUTE for use, (age 12 y+), 30mcg/0.3mL 2021, 2021    COVID-19, PFIZER, ( formula), (age 12y+), IM, 30mcg/0.3mL 2023       Active Problems:  Patient Active Problem List   Diagnosis Code    Diabetes mellitus type 2, insulin dependent (HCC) E11.9, Z79.4    Hypercholesterolemia E78.00    Thoracic or lumbosacral neuritis or radiculitis, unspecified [id number]    Displacement of lumbar intervertebral disc without myelopathy M51.26    Tear of meniscus of right knee S83.206A    Osteoarthritis of spine with  documented pain score (0-10 scale): Pain Level: 2  Last Weight:   Wt Readings from Last 1 Encounters:   04/29/24 127 kg (280 lb)     Mental Status:  oriented and alert    IV Access:  - None    Nursing Mobility/ADLs:  Walking   Independent  Transfer  Independent  Bathing  Assisted  Dressing  Independent  Toileting  Independent  Feeding  Independent  Med Admin  Independent  Med Delivery   none    Wound Care Documentation and Therapy:  Incision 04/22/24 Back Lower;Medial (Active)   Dressing Status Other (Comment) 05/04/24 1121   Dressing/Treatment Open to air 05/04/24 1121   Closure Surgical glue 05/04/24 1121   Margins Approximated 05/04/24 1121   Incision Assessment Dry 05/04/24 1121   Drainage Amount None (dry) 05/04/24 1121   Odor None 05/04/24 1121   Lexie-incision Assessment Ecchymosis 05/04/24 1121   Number of days: 11        Elimination:  Continence:   Bowel: No  Bladder: No  Urinary Catheter: None   Colostomy/Ileostomy/Ileal Conduit: No       Date of Last BM: 5/4/2024    Intake/Output Summary (Last 24 hours) at 5/4/2024 1147  Last data filed at 5/4/2024 0256  Gross per 24 hour   Intake --   Output 800 ml   Net -800 ml     I/O last 3 completed shifts:  In: -   Out: 1850 [Urine:1850]    Safety Concerns:     At Risk for Falls    Impairments/Disabilities:      None    Nutrition Therapy:  Current Nutrition Therapy:   - Oral Diet:  Carb Control 4 carbs/meal (1800kcals/day)    Routes of Feeding: Oral  Liquids: Thin Liquids  Daily Fluid Restriction: no  Last Modified Barium Swallow with Video (Video Swallowing Test): not done    Treatments at the Time of Hospital Discharge:   Respiratory Treatments: N/A  Oxygen Therapy:  is not on home oxygen therapy.  Ventilator:    - No ventilator support    Rehab Therapies: Physical Therapy and Occupational Therapy  Weight Bearing Status/Restrictions: No weight bearing restrictions  Other Medical Equipment (for information only, NOT a DME order):  cane and walker  Other Treatments:

## 2024-05-04 NOTE — DISCHARGE SUMMARY
Subjective:  The patient complains of severe acute on chronic progressive fatigue and low back pain specifically right SI area partially relieved by rest, medications, ice, PT,  OT,   SLP and rest and exacerbated by recent surgery.  Patient is recovering from recent L4 or L5 microdiscectomy on the care Dr. Kingston:    Department of Anesthesiology  Postprocedure Note     Patient: Will Bravo  MRN: [id number]  YOB: 1960  Date of evaluation: 4/22/2024     Procedure Summary         Date: 04/22/24 Room / Location: [address]     Anesthesia Start: 1259 Anesthesia Stop: 1534     Procedure: L4-5 MICRODISECTOMY DECOMPRESSION (Spine Lumbar) Diagnosis:       Myelopathy concurrent with and due to stenosis of lumbar spine (HCC)      (Myelopathy concurrent with and due to stenosis of lumbar spine (HCC) [M48.061, G99.2])     Surgeons: Alda Kingston MD Responsible Provider: Clarence Jewell MD     Anesthesia Type: general, regional ASA Status: 3              He is originally doing well discharged home.  He has had increased groin pain and difficulty with mobility.  He is admitted  4/23/24 through the ER.     He was not able to ambulate well at home and had fallen.  He states that he injured his left foot when he fell and has some bruising in that area.  Stat x-rays of left foot and ankle 4/30/2024:  Left ankle: The mortise joint is preserved.  No radiopaque foreign body,  acute fracture or dislocation is seen.  A moderate-sized spur is seen on the  plantar aspect of the calcaneus and an enthesophyte is seen on the dorsal  aspect.  Tiny spur is also seen on the distal end of the tibia.     Left foot: Mild swelling is seen over the foot.  Spurring is seen on the  medial aspect of the head of the base of the 5th metatarsal.  Mild  degenerative changes are seen in the 1st MTP joint.  No periostitis is seen  to suggest stress fracture.  There are vascular calcifications seen in the  foot.    frequent toileting, ambulate to bathroom with assistance, check post void residuals.  Check for C.difficile x1 if >2 loose stools in 24 hours, continue bowel & bladder program.  Monitor bowel and bladder function.  Lactinex 2 PO every AC.  MOM prn, Brown Bomb prn, Glycerin suppository prn, enema prn.  Encourage therapy and nursing to co-treat and problem solve re continence.    Severe back pain, right SI pain and postop pain status post--right bilateral L4-5 microdecompression discectomy  , as well as generalized OA pain: reassess pain every shift and prior to and after each therapy session, give prn Tylenol and consider scheduled Tylenol, modalities prn in therapy, masage, Lidoderm, K-pad prn. Consider scheduled AM pain meds.  Skin healing -right bilateral L4-5 microdecompression discectomy incision and breakdown risk:  continue pressure relief program.  Daily skin exams and reports from nursing.  Fatigue due to nutritional and hydration deficiency:  titrate vitamin B12 vitamin D and CoQ10 continue to monitor I&O’s, calorie counts prn, dietary consult prn.  Add healthy snack at night.  Acute episodic insomnia with situational adjustment disorder:  prn Ambien, monitor for day time sedation.  Falls risk elevated:  patient to use call light to get nursing assistance to get up, bed and chair alarm.  Elevated DVT risk: progressive activities in PT, continue prophylaxis SHAJI hose, elevation and meds-see MAR  .   Oral pharyngeal dysphagia-up in a degrees of feeds-modified diet as needed.  Complex discharge planning:  ASAP vs DC 5/7/24--home with wife and HHC.  Until then continue weekly team meeting every Thursday to re-assess progress towards goals, discuss and address social, psychological and medical comorbidities and to address difficulties they may be having progressing in therapy.  Patient and family education is in progress.  The patient is to follow-up with their family physician after discharge.    The patient

## 2024-05-04 NOTE — CARE COORDINATION
Verified with Dr. Hartman via verbal order that it is okay for first [facility] Care visit to be on Thursday, May 9th (This is the earliest day available for [facility]). Patient's family is in agreement with first [facility] visit to be on Thursday May 9th. [facility] order placed. Electronically signed by Rajni Taylor RN on 5/4/24 at 2:31 PM EDT

## 2024-05-04 NOTE — PROGRESS NOTES
Physical Therapy  Facility/Department: [facility]  Rehabilitation Discharge note    NAME: Will Bravo  : 1960  MRN: [id number]    Date of discharge: 24        Past Medical History:   Diagnosis Date    Hypertension     Pancreatitis     Type II or unspecified type diabetes mellitus without mention of complication, not stated as uncontrolled      Past Surgical History:   Procedure Laterality Date    CT CRYO ABLATION RENAL TUMOR  2019    CT CRYO ABLATION RENAL TUMOR    KNEE ARTHROSCOPY Left     [facility]    LAMINECTOMY N/A 2024    L4-5 MICRODISECTOMY DECOMPRESSION performed by Alda Kingston MD at [facility] OR    PAIN MANAGEMENT PROCEDURE Right 2024    SI Joint injection vs Transforaminal Epidural steroid injection.Right side under fluroscopy performed by Jorge Tyler MD at [facility] OR    SKIN GRAFT Right     [facility], RIGHT LEG    SPINE SURGERY  2015    LUMBAR    TONSILLECTOMY  1972       Restrictions  Restrictions/Precautions  Restrictions/Precautions: Fall Risk, Weight Bearing  Lower Extremity Weight Bearing Restrictions  Left Lower Extremity Weight Bearing: Weight Bearing As Tolerated  Position Activity Restriction  Other position/activity restrictions: Limit excessive bending, lifting >10lb and twisting based on pain tolerance    Objective    Bed mobility  Rolling to Left: Modified independent  Rolling to Right: Modified independent  Supine to Sit: Modified independent  Sit to Supine: Modified independent  Scooting: Modified independent  Bed Mobility Comments: hob flat, no bedrails used, incrased time and effort     Transfers  Sit to Stand: Supervision  Stand to Sit: Supervision  Bed to Chair: Supervision  Comment: ww, incrased time and effort, completed with good carryover after vc for safe approach to chair with ww     Ambulation  WB Status: wbat  Ambulation  Surface: Level tile  Device: Rolling Walker  Assistance: Stand by assistance;Supervision  Quality of Gait: rt le er > left,  decrased wbing on rle, continued overall flexed posture     Stairs/Curb  Stairs?: Yes  Stairs  # Steps : 4  Stairs Height: 6\"  Rails: Bilateral  Assistance: Supervision;Stand by assistance  Comment: close sba/ supervision dt increased pain this sesson            Pt/ family education/training: Pt and family have been educated in safe mobility and HEP. They state they feel safe for return to home at this level of care    Assessment: Pt has demonstrated improved mobility. Pts ability to improve is impeded by pain level. Pt nad family declined need for w/ c at this time      LTG established:  Long Term Goal 1: Pt will demonstrate bed mobility indep  Long Term Goal 2: Pt will demonstrate transfers indep with safest AD  Long Term Goal 3: Pt will demonstrate amb 50-150ft indep with safest AD with proper gait mechanics  Long Term Goal 4: Pt will demonstrate stair negotiation 3 steps with 1 rail Kaleb  Long Term Goal 5: Pt will be indep with w/c mobility >150ft    Discharge Plan:  D/c to home with follow up PT recommended      Electronically signed by Roxann Olivares PT on 5/4/2024 at 4:50 PM

## 2024-05-04 NOTE — PROGRESS NOTES
Progress Note  Date:2024       Room:[facility]/R248-01  Patient Name:Will Bravo     YOB: 1960     Age:63 y.o.    Chief complaint uncontrolled type 2 diabetes    Subjective    Subjective:  Symptoms:  Stable.    Diet:  Adequate intake.    Activity level: Returning to normal.    Pain:  He complains of pain that is mild.       Review of Systems   Musculoskeletal:  Positive for arthralgias and joint swelling.     Objective         Vitals Last 24 Hours:  TEMPERATURE:  Temp  Av °F (36.7 °C)  Min: 97.9 °F (36.6 °C)  Max: 98.1 °F (36.7 °C)  RESPIRATIONS RANGE: Resp  Av.5  Min: 16  Max: 18  PULSE OXIMETRY RANGE: SpO2  Av.5 %  Min: 96 %  Max: 97 %  PULSE RANGE: Pulse  Av  Min: 76  Max: 96  BLOOD PRESSURE RANGE: Systolic (24hrs), Av , Min:120 , Max:135   ; Diastolic (24hrs), Av, Min:77, Max:78    I/O (24Hr):    Intake/Output Summary (Last 24 hours) at 2024 1202  Last data filed at 2024 0256  Gross per 24 hour   Intake --   Output 800 ml   Net -800 ml     Objective:  General Appearance:  Comfortable.    Vital signs: (most recent): Blood pressure 135/77, pulse 92, temperature 98.1 °F (36.7 °C), temperature source Oral, resp. rate 18, height 1.778 m (5' 10\"), weight 127 kg (280 lb), SpO2 96 %.  Vital signs are normal.    HEENT: Normal HEENT exam.    Lungs:  Normal effort.    Heart: Normal rate.    Abdomen: Abdomen is soft.    Extremities: Normal range of motion.    Neurological: Patient is alert and oriented to person, place and time.    Skin:  No rash.     Labs/Imaging/Diagnostics    Labs:  CBC:  Recent Labs     24   WBC 8.8   RBC 3.76*   HGB 11.3*   HCT 33.0*   MCV 87.8   RDW 13.0        CHEMISTRIES:  Recent Labs     24      K 4.7   CL 95   CO2 29   BUN 30*   CREATININE 1.08   GLUCOSE 289*     PT/INR:No results for input(s): \"PROTIME\", \"INR\" in the last 72 hours.  APTT:No results for input(s): \"APTT\" in the last 72 hours.  LIVER PROFILE:No

## 2024-05-04 NOTE — PROGRESS NOTES
Patient complains of 7/10 pain to RLE. Scheduled oxycontin, flexeril and gabapentin given. Patient didn't sleep well last night because of pain.  Electronically signed by Ratna Mujica LPN on 5/4/2024 at 8:57 AM    Patient states pain is 2/10 at this time since he isn't moving.  Electronically signed by Ratna Mujica LPN on 5/4/2024 at 11:30 AM    Patient complains of 7/10 pain to RLE and back. Scheduled oxycontin, flexeril, gabapentin given.   Electronically signed by Ratna Mujica LPN on 5/4/2024 at 2:52 PM    Discharge instructions explained to patient. Belongings all packed up. Awaiting transport.  Electronically signed by Ratna Mujica LPN on 5/4/2024 at 4:36 PM

## 2024-05-06 NOTE — PROGRESS NOTES
[facility]CCUPATIONAL [facility]ERAP[facility] DISCHARGE SUMMARY- REHAB     Date: 2024  Patient Name: Will Bravo        MRN: [id number]  Account: [id number]   : 1960  (63 y.o.)  Room: [place] [address]    Diagnosis:  Impaired mobility and ADL's due to severe lumbar stenosis    Past Medical History:   Diagnosis Date    Hypertension     Pancreatitis     Type II or unspecified type diabetes mellitus without mention of complication, not stated as uncontrolled      Past Surgical History:   Procedure Laterality Date    CT CRYO ABLATION RENAL TUMOR  2019    CT CRYO ABLATION RENAL TUMOR    KNEE ARTHROSCOPY Left     [facility]    LAMINECTOMY N/A 2024    L4-5 MICRODISECTOMY DECOMPRESSION performed by Alda Kingston MD at [facility]    PAIN MANAGEMENT PROCEDURE Right 2024    SI Joint injection vs Transforaminal Epidural steroid injection.Right side under fluroscopy performed by Jorge Tyler MD at [facility]    SKIN GRAFT Right     [facility], RIGHT LEG    SPINE SURGERY  2015    LUMBAR    TONSILLECTOMY         Precautions:   Restrictions/Precautions: Fall Risk, Weight Bearing  Other position/activity restrictions: Limit excessive bending, lifting >10lb and twisting based on pain tolerance  Left Lower Extremity Weight Bearing: Weight Bearing As Tolerated     Social/Functional History:  Social/Functional History  Lives With: Spouse  Type of Home: House  Home Layout: One level  Home Access: Stairs to enter with rails  Entrance Stairs - Number of Steps: 3  Entrance Stairs - Rails: Left  Bathroom Shower/Tub: Walk-in shower  Bathroom Toilet: Handicap height  Bathroom Equipment: Grab bars in shower  Bathroom Accessibility: Walker accessible  Home Equipment: Cane, Walker, rolling  Has the patient had two or more falls in the past year or any fall with injury in the past year?: Yes  Receives Help From: Family  ADL Assistance: Independent  Homemaking Assistance: Independent  Homemaking Responsibilities: Yes  Ambulation

## 2024-05-14 NOTE — PROGRESS NOTES
Patient Name: Will Bravo : 1960        Date: 2024      Type of Appt: Post Op    Reason for appt: [facility] - 24 - L 4-5 DECOMPRESSION     Pt last seen by Dr Kingston on 2024    Studies done: 24 CT OF LUMBAR AT [facility]     Surgeries: 24 - L 4-5 DECOMPRESSION        [facility]  [address], [address]  [place], [place]  P: [phone]  F: [phone]      Patient: Will Bravo  YOB: 1960  Date: 2024    The patient is a 63 y.o. male who presents today for follow up.    Starting physical therapy  Residual pain mainly on the right side    CT OF THE LUMBAR SPINE WITH CONTRAST  2024 7:12 pm     TECHNIQUE:  CT of the lumbar spine was performed with the administration of intravenous  contrast. Multiplanar reformatted images are provided for review. Automated  exposure control, iterative reconstruction, and/or weight based adjustment of  the mA/kV was utilized to reduce the radiation dose to as low as reasonably  achievable.     COMPARISON:  None     HISTORY:  ORDERING SYSTEM PROVIDED HISTORY: recent discectomy, right thigh  pain/pareshtesias  TECHNOLOGIST PROVIDED HISTORY:  Reason for exam:->recent discectomy, right thigh pain/pareshtesias     FINDINGS:  BONES/ALIGNMENT:  There is normal alignment of the spine. The vertebral body heights are maintained. No osseous destructive lesion is seen.     SOFT TISSUES: No paraspinal mass is seen. No area of abnormal contrast enhancement.     Multilevel degenerative spondylosis with spinal canal and neural foraminal narrowing as described below:     T12-L1, L1-L2, L2-L3, L3-L4: There is moderate to severe spinal canal and neural foraminal narrowing.     L4-L5: There is moderate to severe bilateral neural foraminal narrowing.  The patient underwent a recent right laminectomy.  Small air bubbles are noted at the laminectomy sites likely due to recent surgery.  There is

## 2024-05-21 ENCOUNTER — OFFICE VISIT (OUTPATIENT)
Dept: NEUROSURGERY | Age: 64
End: 2024-05-21

## 2024-05-21 ENCOUNTER — TELEPHONE (OUTPATIENT)
Dept: NEUROSURGERY | Age: 64
End: 2024-05-21

## 2024-05-21 VITALS
TEMPERATURE: 97.6 F | HEIGHT: 70 IN | SYSTOLIC BLOOD PRESSURE: 132 MMHG | DIASTOLIC BLOOD PRESSURE: 74 MMHG | BODY MASS INDEX: 40.09 KG/M2 | WEIGHT: 280 LBS

## 2024-05-21 DIAGNOSIS — M48.061 MYELOPATHY CONCURRENT WITH AND DUE TO STENOSIS OF LUMBAR SPINE (HCC): Primary | ICD-10-CM

## 2024-05-21 DIAGNOSIS — G99.2 MYELOPATHY CONCURRENT WITH AND DUE TO STENOSIS OF LUMBAR SPINE (HCC): Primary | ICD-10-CM

## 2024-05-21 PROCEDURE — 99024 POSTOP FOLLOW-UP VISIT: CPT | Performed by: NEUROLOGICAL SURGERY

## 2024-05-21 RX ORDER — OXYCODONE HYDROCHLORIDE AND ACETAMINOPHEN 5; 325 MG/1; MG/1
1 TABLET ORAL 2 TIMES DAILY PRN
Qty: 60 TABLET | Refills: 0 | Status: SHIPPED | OUTPATIENT
Start: 2024-05-21 | End: 2024-06-20

## 2024-05-21 NOTE — TELEPHONE ENCOUNTER
Oxycodone (Percocet) 5-325 mg tablet prior authorization request submitted online ([email or web address] to Laure), clinical uploaded, auth pending.     (Key: [id number])  PA Case ID #: [id number]  Rx #: [id number]

## 2024-05-28 NOTE — TELEPHONE ENCOUNTER
Since we have not gotten a response to Oxycodone (Percocet) 5-325 mg tablet prior authorization request, I submitted the request a second time ([email or web address] to [facility]), site responded as follows:    -Additional Information Required  A new prior authorization (PA) request cannot be started at this time because there is a request already open for this drug with [employer]. Please contact the PA call center if you have questions on the status of this request.

## 2024-05-29 NOTE — TELEPHONE ENCOUNTER
Clinical information submitted via phone call ([phone]) to Joan.  Oxycodone (Percocet) 5-325 mg tablet approved.  Notification faxed to [employer] [address] ([phone]).    Auth #[id number]  5- to 8-

## 2024-05-29 NOTE — TELEPHONE ENCOUNTER
Patient's insurance needs the following additional clinical information in order to consider approval of Oxycodone (Percocet) 5-325 mg tablet     -Has prescriber consulted patient regarding risk of opioid therapy  -Have clear treatment goals been defined & outlined with patient as part of overall  plan   -Has prescriber reviewed the PDMP to evaluate use of controlled substances    Has Dr. Kingston discussed risk of opioid therapy, defined & outlined clear treatment goals with patient, and reviewed PDMP?    If not, how would Dr. Kingston like to proceed? Patient's insurance will not authorize coverage without the above steps being completed.

## 2024-06-10 ENCOUNTER — TELEPHONE (OUTPATIENT)
Dept: PAIN MANAGEMENT | Age: 64
End: 2024-06-10

## 2024-06-10 NOTE — TELEPHONE ENCOUNTER
Patient called in with a request for a referral to PT. He stated that he was receiving PT in the home and was instructed by the home PT specialist to reach out for a referral to go to a PT office for better/more involved care. He stated that he would prefer the [facility] location and is requesting a call back if Dr. Kingston is able to get him a referral.

## 2024-06-18 DIAGNOSIS — G99.2 MYELOPATHY CONCURRENT WITH AND DUE TO STENOSIS OF LUMBAR SPINE (HCC): Primary | ICD-10-CM

## 2024-06-18 DIAGNOSIS — M48.061 MYELOPATHY CONCURRENT WITH AND DUE TO STENOSIS OF LUMBAR SPINE (HCC): Primary | ICD-10-CM

## 2024-06-27 ENCOUNTER — HOSPITAL ENCOUNTER (OUTPATIENT)
Dept: PHYSICAL THERAPY | Age: 64
Setting detail: THERAPIES SERIES
Discharge: HOME | End: 2024-06-27
Attending: NEUROLOGICAL SURGERY
Payer: MEDICARE

## 2024-06-27 PROCEDURE — 97162 PT EVAL MOD COMPLEX 30 MIN: CPT

## 2024-06-27 NOTE — PLAN OF CARE
Physical Therapy Evaluation/Plan of Care   [address] [place] [address]  Phone: [phone]    Physical Therapy: Initial Evaluation    General Information    Patient: Will Bravo (63 y.o.     male)   Examination Date: 2024   :  1960 ;    Confirmed: Yes MRN: [id number]  CSN: [id number]   Insurance: Payor: MEDICARE / Plan: MEDICARE PART A AND B / Product Type: *No Product type* /   Insurance ID: [id number] - (Medicare)  PT Insurance Information: Medicare; [facility] 2nd Secondary Insurance (if applicable): [facility]    Referring Physician: Alda Kingston MD       Visits to Date/Visits Approved: 1 /      No Show/Cancelled Appts: 0 / 0     Medical Diagnosis: Myelopathy concurrent with and due to stenosis of lumbar spine (HCC) [M48.061, G99.2]        Treatment Diagnosis: low back pain, impaired gait, decreased LE strength      SUBJECTIVE:     Onset date: surgery on 24        Subjective/ Mechanism of Injury:   Subjective: Patient s/p L4-5 decompression on 24 and reportts improvement in symptoms.  Reports prior to surgery he was having a lot of pain and difficulty with ambulation.  Patient reports he did fall after surgery and she has been having right side pain in knee, glut, groin, and lateral lower leg.  Reports he has right ankle issues and reports some edema with tension around the joint.  Patient currently uses ww and straight cane.  Increased pain with movement, but has constant pain.  Decreased pain with medication.     Changes in Bowel/Bladder Function: no  Saddle Anesthesia: no  Unexplained Weight Loss: no  Unrelenting Night Pain: no  Sudden Weakness/Falls: no    Interventions for current problem: surgery      Pain:   Current: high pain reported/10     Symptom Type / Quality: aching  Location:: Right, Lower Back: With radiation down R LE Hip: groin Knee: lateral Ankle: medial side and lateral side      Imaging results (If Applicable): CT ABD/PEL W IVCON    Result Date: 2024  * *

## 2024-07-09 ENCOUNTER — APPOINTMENT (OUTPATIENT)
Dept: PHYSICAL THERAPY | Age: 64
End: 2024-07-09
Attending: NEUROLOGICAL SURGERY
Payer: MEDICARE

## 2024-07-09 ENCOUNTER — HOSPITAL ENCOUNTER (OUTPATIENT)
Dept: PHYSICAL THERAPY | Age: 64
Setting detail: THERAPIES SERIES
Discharge: HOME | End: 2024-07-09
Attending: NEUROLOGICAL SURGERY
Payer: MEDICARE

## 2024-07-09 PROCEDURE — 97110 THERAPEUTIC EXERCISES: CPT

## 2024-07-09 ASSESSMENT — PAIN DESCRIPTION - ORIENTATION: ORIENTATION: RIGHT

## 2024-07-09 ASSESSMENT — PAIN DESCRIPTION - LOCATION: LOCATION: BACK;LEG

## 2024-07-09 ASSESSMENT — PAIN DESCRIPTION - DESCRIPTORS: DESCRIPTORS: ACHING

## 2024-07-09 ASSESSMENT — PAIN SCALES - GENERAL: PAINLEVEL_OUTOF10: 4

## 2024-07-09 NOTE — PROGRESS NOTES
[address]   [place] [address]  Phone:[phone]      Physical TherapyTreatment Note        Date: 2024  Patient: Will Bravo  : 1960   Confirmed: Yes  MRN: [id number]  Referring Provider: Alda Kingston MD  Medical Diagnosis: Myelopathy concurrent with and due to stenosis of lumbar spine (HCC) [M48.061, G99.2]   Treatment Diagnosis: low back pain, impaired gait, decreased LE strength    Visit Information:  Insurance: Payor: MEDICARE / Plan: MEDICARE PART A AND B / Product Type: *No Product type* /   PT Visit Information  PT Insurance Information: Medicare; BCBS 2nd  Total # of Visits to Date: 2  Plan of Care/Certification Expiration Date: 24  No Show: 0  Progress Note Due Date: 24  Canceled Appointment: 0  Progress Note Counter:  visits    Subjective Information:  Subjective: Pt states that his low back hurts but his right leg bothers him the most. His back is ok when he's not moving. LB hurts to twist and reach. Feels like a toothache in his thigh and hurts in the shin.  HEP Compliance:  [x] Good [] Fair  [] Poor [] Reports not doing due to:     Pain Screening  Patient Currently in Pain: Yes  Pain Assessment: 0-10  Pain Level: 4  Pain Location: Back, Leg  Pain Orientation: Right  Pain Descriptors: Aching    Treatment:  Exercises:  Exercises  Exercise 1: H/L on wedge or 2 pillows:  TA isometric 5 sec hold x 10  Exercise 2: TA with march x 10  Exercise 3: supine hip adduction with ball 5 sec x 10, seated hip abduction GTB 5 sec x 10  Exercise 4: LTR (gentle only) 10 x 3\"  Exercise 5: hamstring stretch with stool 3 x 10-20\"  Exercise 6: low bridge*  Exercise 7: SAQ*  Exercise 8: clamshells*  Exercise 9: sit to stands*  Exercise 10: sink ex*  Exercise 11: LAQ, hamstring curls*    *Indicates exercise, modality, or manual techniques to be initiated when appropriate      Assessment:   Body Structures, Functions, Activity Limitations Requiring Skilled Therapeutic

## 2024-07-11 ENCOUNTER — HOSPITAL ENCOUNTER (OUTPATIENT)
Dept: PHYSICAL THERAPY | Age: 64
Setting detail: THERAPIES SERIES
Discharge: HOME | End: 2024-07-11
Attending: NEUROLOGICAL SURGERY
Payer: MEDICARE

## 2024-07-11 DIAGNOSIS — G99.2 MYELOPATHY CONCURRENT WITH AND DUE TO STENOSIS OF LUMBAR SPINE (HCC): ICD-10-CM

## 2024-07-11 DIAGNOSIS — M48.061 MYELOPATHY CONCURRENT WITH AND DUE TO STENOSIS OF LUMBAR SPINE (HCC): ICD-10-CM

## 2024-07-11 PROCEDURE — G0283 ELEC STIM OTHER THAN WOUND: HCPCS

## 2024-07-11 PROCEDURE — 97110 THERAPEUTIC EXERCISES: CPT

## 2024-07-11 RX ORDER — OXYCODONE HYDROCHLORIDE AND ACETAMINOPHEN 5; 325 MG/1; MG/1
1 TABLET ORAL 2 TIMES DAILY PRN
Qty: 60 TABLET | Refills: 0 | Status: CANCELLED | OUTPATIENT
Start: 2024-07-11 | End: 2024-08-10

## 2024-07-11 ASSESSMENT — PAIN DESCRIPTION - DESCRIPTORS: DESCRIPTORS: ACHING

## 2024-07-11 ASSESSMENT — PAIN DESCRIPTION - ORIENTATION: ORIENTATION: RIGHT

## 2024-07-11 ASSESSMENT — PAIN DESCRIPTION - LOCATION: LOCATION: BACK;LEG

## 2024-07-11 ASSESSMENT — PAIN SCALES - GENERAL: PAINLEVEL_OUTOF10: 6

## 2024-07-11 NOTE — PROGRESS NOTES
[facility]  Outpatient Physical Therapy   Treatment Note        Date: 2024  Patient: Will Bravo  : 1960   Confirmed: Yes  MRN: [id number]  Referring Provider: Alda Kingston MD      Medical Diagnosis: Myelopathy concurrent with and due to stenosis of lumbar spine (HCC) [M48.061, G99.2]      Treatment Diagnosis: low back pain, impaired gait, decreased LE strength    Visit Information:  Insurance: Payor: MEDICARE / Plan: MEDICARE PART A AND B / Product Type: *No Product type* /   PT Visit Information  PT Insurance Information: Medicare; BCBS 2nd  Total # of Visits to Date: 2  Plan of Care/Certification Expiration Date: 24  No Show: 0  Progress Note Due Date: 24  Canceled Appointment: 0  Progress Note Counter:  visits    Subjective Information:  Subjective: Patient reports increased pain this date.  HEP Compliance:  [x] Good [] Fair [] Poor [] Reports not doing due to:             Pain Screening  Patient Currently in Pain: Yes  Pain Assessment: 0-10  Pain Level: 6  Pain Location: Back, Leg  Pain Orientation: Right  Pain Descriptors: Aching    Treatment:  Exercises:  Exercises  Exercise 1: seated TA isometric with breath x 10  Exercise 2: supine on wedge with 2 pillows: TA with march x 10  Exercise 3: supine hip adduction with ball 5 sec x 12, seated hip abduction GTB 5 sec x 12  Exercise 4: LTR (gentle only) 10 x 3\"  Exercise 5: hamstring stretch with step 3 x 20 sec hold  Exercise 7: SAQ 3 sec hold x 10  Exercise 9: sit to stands x 5 with UE support  Exercise 11: hamstring curls with RTB x 10, bilateral  Exercise 12: butterfly stretch 20 sec hold x 1 (pt completes one leg at a time)  Exercise 20: HEP: sit to stands       Modalities:  Cryotherapy (CPT 22606)  Patient Position: Seated  Number Minutes Cryotherapy: 10 minutes  Cryotherapy location: Low back  Cryotherapy specified location: low back  Post treatment skin assessment: Redness - no adverse reaction  Electric

## 2024-07-11 NOTE — TELEPHONE ENCOUNTER
Requested Prescriptions     Pending Prescriptions Disp Refills    oxyCODONE-acetaminophen (PERCOCET) 5-325 MG per tablet 60 tablet 0     Sig: Take 1 tablet by mouth 2 times daily as needed for Pain for up to 30 days. Intended supply: 30 days. Take lowest dose possible to manage pain Max Daily Amount: 2 tablets       Patient last seen on:  5/21    Date of last surgery:  4/22    Date of last refill:  5/21    Reason for request:  PT states that he is doing physical therapy but is still having pain in his leg. He says he does have pain management who switched him from gabapentin to lyrica but it is not helping his leg pain. He is asking for a refill of the percocet.     Request date for pharmacy pick-up:  7/11    Next office visit date: Visit date not found    Patient has no known allergies.

## 2024-07-11 NOTE — TELEPHONE ENCOUNTER
Patient was referred to pain management for evaluation of long-term medical treatment and pain management modalities   none

## 2024-07-16 ENCOUNTER — HOSPITAL ENCOUNTER (OUTPATIENT)
Dept: PHYSICAL THERAPY | Age: 64
Setting detail: THERAPIES SERIES
Discharge: HOME | End: 2024-07-16
Attending: NEUROLOGICAL SURGERY
Payer: MEDICARE

## 2024-07-16 PROCEDURE — 97110 THERAPEUTIC EXERCISES: CPT

## 2024-07-16 ASSESSMENT — PAIN DESCRIPTION - DESCRIPTORS: DESCRIPTORS: ACHING

## 2024-07-16 ASSESSMENT — PAIN DESCRIPTION - LOCATION: LOCATION: BACK;LEG

## 2024-07-16 ASSESSMENT — PAIN SCALES - GENERAL: PAINLEVEL_OUTOF10: 7

## 2024-07-16 ASSESSMENT — PAIN DESCRIPTION - ORIENTATION: ORIENTATION: RIGHT

## 2024-07-16 NOTE — PROGRESS NOTES
Activity Minutes Units   Ther Ex 40 3     Electronically signed by Sobeida Chandler PTA on 7/16/24 at 2:17 PM EDT

## 2024-07-18 ENCOUNTER — HOSPITAL ENCOUNTER (OUTPATIENT)
Dept: PHYSICAL THERAPY | Age: 64
Setting detail: THERAPIES SERIES
Discharge: HOME | End: 2024-07-18
Attending: NEUROLOGICAL SURGERY
Payer: MEDICARE

## 2024-07-18 PROCEDURE — 97110 THERAPEUTIC EXERCISES: CPT

## 2024-07-18 ASSESSMENT — PAIN DESCRIPTION - ORIENTATION: ORIENTATION: RIGHT

## 2024-07-18 ASSESSMENT — PAIN DESCRIPTION - DESCRIPTORS: DESCRIPTORS: ACHING

## 2024-07-18 ASSESSMENT — PAIN SCALES - GENERAL: PAINLEVEL_OUTOF10: 2

## 2024-07-18 ASSESSMENT — PAIN DESCRIPTION - LOCATION: LOCATION: BACK;LEG

## 2024-07-18 NOTE — PROGRESS NOTES
pain, impaired gait, decreased LE strength  Therapy Prognosis: Good    Post-Pain Assessment:       Pain Rating (0-10 pain scale):   2/10   Location and pain description same as pre-treatment unless indicated.   Action: [x] NA   [] Perform HEP  [] Meds as prescribed  [] Modalities as prescribed   [] Call Physician     GOALS   Patient Goal(s): Patient Goals : \"feel safe walking\"    Short Term Goals Completed by 4 weeks Goal Status   STG 1 Patient will report </= 2/10 pain in right LE with ambulation. In progress   STG 2 Patient will be independent with HEP. In progress       Long Term Goals Completed by 6 weeks Goal Status   LTG 1 Patient will increase right SLR >/= 70 degrees for improved functional tolerance. In progress   LTG 2 Patient will increase strength in bilateral LEs >/= 4+/5 for improved ambulation. In progress   LTG 3 Modified Oswestry </= 22/50 to demonstrate functional improvements. In progress   LTG 4 Patient will ambulate 150ft independently with LRD with improved upright posture and symmetry with step length. In progress       Plan:  Frequency/Duration:  Plan  Plan Frequency: 2  Plan weeks: 6  Current Treatment Recommendations: Strengthening, ROM, Balance training, Functional mobility training, Neuromuscular re-education, Gait training, Stair training, Home exercise program, Safety education & training, Patient/Caregiver education & training, Equipment evaluation, education, & procurement, Manual, Modalities  Modalities: Heat/Cold, E-stim - unattended  Pt to continue current HEP.  See objective section for any therapeutic exercise changes, additions or modifications this date.    Therapy Time:  PT Individual Minutes  Time In: 0850  Time Out: 0928  Minutes: 38  Timed Code Treatment Minutes: 38 Minutes  Timed Activity Minutes Units   Ther Ex 38 3     Electronically signed by Sobeida Chandler PTA on 7/18/24 at 9:47 AM EDT

## 2024-07-23 ENCOUNTER — HOSPITAL ENCOUNTER (OUTPATIENT)
Dept: PHYSICAL THERAPY | Age: 64
Setting detail: THERAPIES SERIES
Discharge: HOME | End: 2024-07-23
Attending: NEUROLOGICAL SURGERY
Payer: MEDICARE

## 2024-07-23 PROCEDURE — 97110 THERAPEUTIC EXERCISES: CPT

## 2024-07-23 NOTE — PROGRESS NOTES
Structures, Functions, Activity Limitations Requiring Skilled Therapeutic Intervention: Decreased functional mobility , Decreased ROM, Decreased strength, Decreased endurance, Decreased sensation, Increased pain, Decreased posture  Assessment: Increased reps with TA iso, hip add, and hip abd. Added hamstring curls and LAQs with challenge on R LE. Offered E-stim but pt declined. Pt elected cold pack application with relief reported post.  Treatment Diagnosis: low back pain, impaired gait, decreased LE strength  Therapy Prognosis: Good    Post-Pain Assessment:       Pain Rating (0-10 pain scale):   \"better\"/10   Location and pain description same as pre-treatment unless indicated.   Action: [x] NA   [] Perform HEP  [] Meds as prescribed  [] Modalities as prescribed   [] Call Physician     GOALS   Patient Goal(s): Patient Goals : \"feel safe walking\"    Short Term Goals Completed by 4 weeks Goal Status   STG 1 Patient will report </= 2/10 pain in right LE with ambulation. In progress   STG 2 Patient will be independent with HEP. In progress       Long Term Goals Completed by 6 weeks Goal Status   LTG 1 Patient will increase right SLR >/= 70 degrees for improved functional tolerance. In progress   LTG 2 Patient will increase strength in bilateral LEs >/= 4+/5 for improved ambulation. In progress   LTG 3 Modified Oswestry </= 22/50 to demonstrate functional improvements. In progress   LTG 4 Patient will ambulate 150ft independently with LRD with improved upright posture and symmetry with step length. In progress       Plan:  Frequency/Duration:  Plan  Plan Frequency: 2  Plan weeks: 6  Current Treatment Recommendations: Strengthening, ROM, Balance training, Functional mobility training, Neuromuscular re-education, Gait training, Stair training, Home exercise program, Safety education & training, Patient/Caregiver education & training, Equipment evaluation, education, & procurement, Manual, Modalities  Modalities:  room air

## 2024-07-25 ENCOUNTER — HOSPITAL ENCOUNTER (OUTPATIENT)
Dept: PHYSICAL THERAPY | Age: 64
Setting detail: THERAPIES SERIES
Discharge: HOME | End: 2024-07-25
Attending: NEUROLOGICAL SURGERY
Payer: MEDICARE

## 2024-07-25 PROCEDURE — 97110 THERAPEUTIC EXERCISES: CPT

## 2024-07-25 NOTE — PLAN OF CARE
Signature:__________________________________________________________  Date:  Please sign and return

## 2024-07-25 NOTE — PROGRESS NOTES
improved functional tolerance. In progress   LTG 2 Patient will increase strength in bilateral LEs >/= 4+/5 for improved ambulation. In progress   LTG 3 Modified Oswestry </= 22/50 to demonstrate functional improvements. In progress   LTG 4 Patient will ambulate 150ft independently with LRD with improved upright posture and symmetry with step length. In progress       Plan:  Frequency/Duration:  Plan  Plan Frequency: 2  Plan weeks: 4  Current Treatment Recommendations: Strengthening, ROM, Balance training, Functional mobility training, Neuromuscular re-education, Gait training, Stair training, Home exercise program, Safety education & training, Patient/Caregiver education & training, Equipment evaluation, education, & procurement, Manual, Modalities  Modalities: Heat/Cold, E-stim - unattended  Additional Comments: additional visits  Pt to continue current HEP.  See objective section for any therapeutic exercise changes, additions or modifications this date.    Therapy Time:      PT Individual Minutes  Time In: 0800  Time Out: 0850  Minutes: 50  Timed Code Treatment Minutes: 40 Minutes  Procedure Minutes:10 minutes cold pack  Timed Activity Minutes Units   Ther Ex 40 3   Manual      Neuro Re-Ed       Electronically signed by Vee Mejía, PT on 7/25/24 at 12:33 PM EDT

## 2024-07-30 ENCOUNTER — HOSPITAL ENCOUNTER (OUTPATIENT)
Dept: PHYSICAL THERAPY | Age: 64
Setting detail: THERAPIES SERIES
Discharge: HOME | End: 2024-07-30
Attending: NEUROLOGICAL SURGERY
Payer: MEDICARE

## 2024-07-30 PROCEDURE — 97110 THERAPEUTIC EXERCISES: CPT

## 2024-07-30 ASSESSMENT — PAIN DESCRIPTION - DESCRIPTORS: DESCRIPTORS: ACHING

## 2024-07-30 ASSESSMENT — PAIN SCALES - GENERAL: PAINLEVEL_OUTOF10: 5

## 2024-07-30 ASSESSMENT — PAIN DESCRIPTION - ORIENTATION: ORIENTATION: RIGHT

## 2024-07-30 ASSESSMENT — PAIN DESCRIPTION - LOCATION: LOCATION: BACK;LEG

## 2024-07-30 NOTE — PROGRESS NOTES
unattended  Pt to continue current HEP.  See objective section for any therapeutic exercise changes, additions or modifications this date.    Therapy Time:  PT Individual Minutes  Time In: 1302  Time Out: 1350  Minutes: 48  Timed Code Treatment Minutes: 38 Minutes  Procedure Minutes: CP x 10 min   Timed Activity Minutes Units   Ther Ex 38 3     Electronically signed by Sobeida Chandler PTA on 7/30/24 at 1:43 PM EDT

## 2024-08-01 ENCOUNTER — HOSPITAL ENCOUNTER (OUTPATIENT)
Dept: PHYSICAL THERAPY | Age: 64
Setting detail: THERAPIES SERIES
Discharge: HOME | End: 2024-08-01
Attending: NEUROLOGICAL SURGERY

## 2024-08-01 NOTE — PROGRESS NOTES
Therapy                            Cancellation/No-show Note    Date: 2024  Patient: Will Bravo (63 y.o. 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 (HCC) [M48.061, G99.2]      Visit Information:  Insurance: Payor: MEDICARE / Plan: MEDICARE PART A AND B / Product Type: *No Product type* /   Visits to Date: 8   No Show/Cancelled Appts: 0 / 1      For today's appointment patient:  [x]  Cancelled  []  Rescheduled appointment  []  No-show   []  Called pt to remind of next appointment     Reason given by patient:  []  Patient ill  []  Conflicting appointment  []  No transportation    []  Conflict with work  []  No reason given  [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    Reason:   If > 2 weeks please discuss with therapist.  [] Therapist to call pt for follow up     Comments: no future appointments, call patient to schedule       Signature: Electronically signed by Sobeida Chandler PTA on 24 at 8:05 AM EDT

## 2025-04-07 NOTE — PROGRESS NOTES
Occupational Therapy    Evaluation    Patient Name: Gary Sharma  MRN: [id number]  Today's Date: 4/16/2024  Time Calculation  Start Time: 1112  Stop Time: 1123  Time Calculation (min): 11 min      Assessment:  End of Session Communication: Bedside nurse  End of Session Patient Position: Bed, 2 rail up, Alarm off, not on at start of session     Plan:  No Skilled OT: No acute OT goals identified  OT Frequency: OT eval only  OT Discharge Recommendations: No further acute OT  OT - OK to Discharge: Yes (Once medically appropriate.)     Subjective   Current Problem:  1. Intractable back pain        2. Back pain of lumbosacral region with sciatica        3. Redness and swelling of upper arm  Vascular US upper extremity venous duplex left    Vascular US upper extremity venous duplex left        General:  General  Reason for Referral: ADLs  Referred By: Raj  Past Medical History Relevant to Rehab: OA, DM, HLD, Renal carcinoma, Sciatica, DIAZ, Back sx  Family/Caregiver Present: Yes  Caregiver Feedback: Family member present - supportive.  Prior to Session Communication: Bedside nurse (Cleared for therapy evaluation by RN.)  Patient Position Received: Bed, 2 rail up, Alarm off, not on at start of session (Seated EOB.)  General Comment: Patient presented with c/o back pain. US L UE: (-) DVT. MRI L spine: multi-level DDD, most pronounced at L4-5, severe spinal canal stenosis, moderate to severe R and moderate L neural foraminal stenosis. Ortho and pain management consulted; recommend conservative treatment and therapy.     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 Walk in shower with a grab bar.  Prior Function:  Prior Function Comments: Patient ambulates  independently without a device, has been using a cane the past few days d/t pain. Independent with ADLs and IADLs. Reports 1 fall in the past 3 months. Patient drives. Retired.     ADL:  Eating Assistance: Independent  Grooming Assistance: Independent  Bathing Assistance: Stand by  UE Dressing Assistance: Independent  LE Dressing Assistance: Modified independent (Device)  Toileting Assistance with Device: Modified independent  ADL Comments: Patient reports completing ADLs without assistance in the room.     Bed Mobility/Transfers: Bed Mobility 1  Bed Mobility 1: Sitting to supine  Bed Mobility Comments 1: SBA    Transfer 1  Technique 1: Sit to stand, Stand to sit  Transfer Device 1:  (Cane)  Trials/Comments 1: Sit <> stands from EOB and bedside chair without a device at SBA level.    Functional Mobility:  Functional Mobility  Functional Mobility Performed: Yes  Functional Mobility 1  Comments 1: Patient completed functional mobility throughout the room with a SC at SBA level. Patient c/o back pain with mobility.     Standing Balance:  Static Standing Balance  Static Standing-Comment/Number of Minutes: Fair +      Strength:  Strength Comments: B/L UE MMT not formally assessed, WFL during functional tasks.     Extremities: RUE   RUE : Within Functional Limits and LUE   LUE: Within Functional Limits    Outcome Measures:[facility] Daily Activity  Putting on and taking off regular lower body clothing: None  Bathing (including washing, rinsing, drying): A little  Putting on and taking off regular upper body clothing: None  Toileting, which includes using toilet, bedpan or urinal: None  Taking care of personal grooming such as brushing teeth: None  Eating Meals: None  Daily Activity - Total Score: 23    EDUCATION:  Education  Individual(s) Educated: Patient  Education Provided: POC discussed and agreed upon, Risk and benefits of OT discussed with patient or other, Fall precautons  Patient Response to Education:  Patient/Caregiver Verbalized Understanding of Information                      No

## 2025-04-28 ENCOUNTER — APPOINTMENT (OUTPATIENT)
Dept: RADIOLOGY | Facility: HOSPITAL | Age: 65
End: 2025-04-28
Payer: COMMERCIAL

## 2025-04-28 ENCOUNTER — HOSPITAL ENCOUNTER (EMERGENCY)
Facility: HOSPITAL | Age: 65
Discharge: HOME | End: 2025-04-28
Attending: STUDENT IN AN ORGANIZED HEALTH CARE EDUCATION/TRAINING PROGRAM
Payer: COMMERCIAL

## 2025-04-28 VITALS
WEIGHT: 280 LBS | BODY MASS INDEX: 40.09 KG/M2 | RESPIRATION RATE: 17 BRPM | HEIGHT: 70 IN | HEART RATE: 97 BPM | TEMPERATURE: 98.2 F | DIASTOLIC BLOOD PRESSURE: 79 MMHG | SYSTOLIC BLOOD PRESSURE: 159 MMHG | OXYGEN SATURATION: 96 %

## 2025-04-28 DIAGNOSIS — M54.42 LEFT-SIDED LOW BACK PAIN WITH LEFT-SIDED SCIATICA, UNSPECIFIED CHRONICITY: Primary | ICD-10-CM

## 2025-04-28 LAB
ALBUMIN SERPL BCP-MCNC: 4.5 G/DL (ref 3.4–5)
ALP SERPL-CCNC: 39 U/L (ref 33–136)
ALT SERPL W P-5'-P-CCNC: 49 U/L (ref 10–52)
ANION GAP SERPL CALC-SCNC: 15 MMOL/L (ref 10–20)
APPEARANCE UR: CLEAR
AST SERPL W P-5'-P-CCNC: 54 U/L (ref 9–39)
BASOPHILS # BLD AUTO: 0.05 X10*3/UL (ref 0–0.1)
BASOPHILS NFR BLD AUTO: 0.8 %
BILIRUB SERPL-MCNC: 0.6 MG/DL (ref 0–1.2)
BILIRUB UR STRIP.AUTO-MCNC: NEGATIVE MG/DL
BUN SERPL-MCNC: 26 MG/DL (ref 6–23)
CALCIUM SERPL-MCNC: 10.2 MG/DL (ref 8.6–10.3)
CHLORIDE SERPL-SCNC: 99 MMOL/L (ref 98–107)
CO2 SERPL-SCNC: 26 MMOL/L (ref 21–32)
COLOR UR: ABNORMAL
CREAT SERPL-MCNC: 1.03 MG/DL (ref 0.5–1.3)
EGFRCR SERPLBLD CKD-EPI 2021: 82 ML/MIN/1.73M*2
EOSINOPHIL # BLD AUTO: 0.27 X10*3/UL (ref 0–0.7)
EOSINOPHIL NFR BLD AUTO: 4.3 %
ERYTHROCYTE [DISTWIDTH] IN BLOOD BY AUTOMATED COUNT: 13.2 % (ref 11.5–14.5)
GLUCOSE SERPL-MCNC: 229 MG/DL (ref 74–99)
GLUCOSE UR STRIP.AUTO-MCNC: ABNORMAL MG/DL
HCT VFR BLD AUTO: 34.4 % (ref 41–52)
HGB BLD-MCNC: 11.7 G/DL (ref 13.5–17.5)
HOLD SPECIMEN: NORMAL
IMM GRANULOCYTES # BLD AUTO: 0.03 X10*3/UL (ref 0–0.7)
IMM GRANULOCYTES NFR BLD AUTO: 0.5 % (ref 0–0.9)
KETONES UR STRIP.AUTO-MCNC: NEGATIVE MG/DL
LEUKOCYTE ESTERASE UR QL STRIP.AUTO: NEGATIVE
LIPASE SERPL-CCNC: 52 U/L (ref 9–82)
LYMPHOCYTES # BLD AUTO: 1.65 X10*3/UL (ref 1.2–4.8)
LYMPHOCYTES NFR BLD AUTO: 26.1 %
MCH RBC QN AUTO: 31.1 PG (ref 26–34)
MCHC RBC AUTO-ENTMCNC: 34 G/DL (ref 32–36)
MCV RBC AUTO: 92 FL (ref 80–100)
MONOCYTES # BLD AUTO: 0.54 X10*3/UL (ref 0.1–1)
MONOCYTES NFR BLD AUTO: 8.6 %
NEUTROPHILS # BLD AUTO: 3.77 X10*3/UL (ref 1.2–7.7)
NEUTROPHILS NFR BLD AUTO: 59.7 %
NITRITE UR QL STRIP.AUTO: NEGATIVE
NRBC BLD-RTO: 0 /100 WBCS (ref 0–0)
PH UR STRIP.AUTO: 6 [PH]
PLATELET # BLD AUTO: 201 X10*3/UL (ref 150–450)
POTASSIUM SERPL-SCNC: 4.6 MMOL/L (ref 3.5–5.3)
PROT SERPL-MCNC: 8.8 G/DL (ref 6.4–8.2)
PROT UR STRIP.AUTO-MCNC: NEGATIVE MG/DL
RBC # BLD AUTO: 3.76 X10*6/UL (ref 4.5–5.9)
RBC # UR STRIP.AUTO: NEGATIVE MG/DL
SODIUM SERPL-SCNC: 135 MMOL/L (ref 136–145)
SP GR UR STRIP.AUTO: 1.01
UROBILINOGEN UR STRIP.AUTO-MCNC: NORMAL MG/DL
WBC # BLD AUTO: 6.3 X10*3/UL (ref 4.4–11.3)

## 2025-04-28 PROCEDURE — 72131 CT LUMBAR SPINE W/O DYE: CPT | Mod: FOREIGN READ | Performed by: STUDENT IN AN ORGANIZED HEALTH CARE EDUCATION/TRAINING PROGRAM

## 2025-04-28 PROCEDURE — 2550000001 HC RX 255 CONTRASTS: Performed by: STUDENT IN AN ORGANIZED HEALTH CARE EDUCATION/TRAINING PROGRAM

## 2025-04-28 PROCEDURE — 36415 COLL VENOUS BLD VENIPUNCTURE: CPT | Performed by: STUDENT IN AN ORGANIZED HEALTH CARE EDUCATION/TRAINING PROGRAM

## 2025-04-28 PROCEDURE — 2500000004 HC RX 250 GENERAL PHARMACY W/ HCPCS (ALT 636 FOR OP/ED): Mod: JZ

## 2025-04-28 PROCEDURE — 81003 URINALYSIS AUTO W/O SCOPE: CPT | Performed by: STUDENT IN AN ORGANIZED HEALTH CARE EDUCATION/TRAINING PROGRAM

## 2025-04-28 PROCEDURE — 74177 CT ABD & PELVIS W/CONTRAST: CPT

## 2025-04-28 PROCEDURE — 2500000004 HC RX 250 GENERAL PHARMACY W/ HCPCS (ALT 636 FOR OP/ED): Performed by: STUDENT IN AN ORGANIZED HEALTH CARE EDUCATION/TRAINING PROGRAM

## 2025-04-28 PROCEDURE — 83690 ASSAY OF LIPASE: CPT | Performed by: STUDENT IN AN ORGANIZED HEALTH CARE EDUCATION/TRAINING PROGRAM

## 2025-04-28 PROCEDURE — 74177 CT ABD & PELVIS W/CONTRAST: CPT | Mod: FOREIGN READ | Performed by: STUDENT IN AN ORGANIZED HEALTH CARE EDUCATION/TRAINING PROGRAM

## 2025-04-28 PROCEDURE — 84075 ASSAY ALKALINE PHOSPHATASE: CPT | Performed by: STUDENT IN AN ORGANIZED HEALTH CARE EDUCATION/TRAINING PROGRAM

## 2025-04-28 PROCEDURE — 96374 THER/PROPH/DIAG INJ IV PUSH: CPT | Mod: 59

## 2025-04-28 PROCEDURE — 96375 TX/PRO/DX INJ NEW DRUG ADDON: CPT

## 2025-04-28 PROCEDURE — 85025 COMPLETE CBC W/AUTO DIFF WBC: CPT | Performed by: STUDENT IN AN ORGANIZED HEALTH CARE EDUCATION/TRAINING PROGRAM

## 2025-04-28 PROCEDURE — 99285 EMERGENCY DEPT VISIT HI MDM: CPT | Mod: 25 | Performed by: STUDENT IN AN ORGANIZED HEALTH CARE EDUCATION/TRAINING PROGRAM

## 2025-04-28 PROCEDURE — 2500000001 HC RX 250 WO HCPCS SELF ADMINISTERED DRUGS (ALT 637 FOR MEDICARE OP): Performed by: STUDENT IN AN ORGANIZED HEALTH CARE EDUCATION/TRAINING PROGRAM

## 2025-04-28 RX ORDER — PREDNISONE 20 MG/1
60 TABLET ORAL ONCE
Status: COMPLETED | OUTPATIENT
Start: 2025-04-28 | End: 2025-04-28

## 2025-04-28 RX ORDER — CYCLOBENZAPRINE HCL 10 MG
10 TABLET ORAL 2 TIMES DAILY PRN
Qty: 20 TABLET | Refills: 0 | Status: SHIPPED | OUTPATIENT
Start: 2025-04-28 | End: 2025-05-08

## 2025-04-28 RX ORDER — MORPHINE SULFATE 4 MG/ML
INJECTION, SOLUTION INTRAMUSCULAR; INTRAVENOUS
Status: COMPLETED
Start: 2025-04-28 | End: 2025-04-28

## 2025-04-28 RX ORDER — METHYLPREDNISOLONE 4 MG/1
TABLET ORAL
Qty: 21 TABLET | Refills: 0 | Status: SHIPPED | OUTPATIENT
Start: 2025-04-28 | End: 2025-05-04

## 2025-04-28 RX ORDER — ACETAMINOPHEN 325 MG/1
975 TABLET ORAL ONCE
Status: COMPLETED | OUTPATIENT
Start: 2025-04-28 | End: 2025-04-28

## 2025-04-28 RX ORDER — KETOROLAC TROMETHAMINE 30 MG/ML
15 INJECTION, SOLUTION INTRAMUSCULAR; INTRAVENOUS ONCE
Status: COMPLETED | OUTPATIENT
Start: 2025-04-28 | End: 2025-04-28

## 2025-04-28 RX ORDER — MORPHINE SULFATE 4 MG/ML
4 INJECTION, SOLUTION INTRAMUSCULAR; INTRAVENOUS ONCE
Status: COMPLETED | OUTPATIENT
Start: 2025-04-28 | End: 2025-04-28

## 2025-04-28 RX ADMIN — IOHEXOL 75 ML: 350 INJECTION, SOLUTION INTRAVENOUS at 09:57

## 2025-04-28 RX ADMIN — MORPHINE SULFATE 4 MG: 4 INJECTION, SOLUTION INTRAMUSCULAR; INTRAVENOUS at 10:47

## 2025-04-28 RX ADMIN — ACETAMINOPHEN 975 MG: 325 TABLET ORAL at 08:01

## 2025-04-28 RX ADMIN — KETOROLAC TROMETHAMINE 15 MG: 30 INJECTION, SOLUTION INTRAMUSCULAR at 08:01

## 2025-04-28 RX ADMIN — PREDNISONE 60 MG: 20 TABLET ORAL at 08:01

## 2025-04-28 ASSESSMENT — PAIN DESCRIPTION - PAIN TYPE
TYPE: ACUTE PAIN

## 2025-04-28 ASSESSMENT — PAIN DESCRIPTION - FREQUENCY: FREQUENCY: CONSTANT/CONTINUOUS

## 2025-04-28 ASSESSMENT — PAIN DESCRIPTION - ORIENTATION
ORIENTATION: LOWER

## 2025-04-28 ASSESSMENT — PAIN - FUNCTIONAL ASSESSMENT
PAIN_FUNCTIONAL_ASSESSMENT: 0-10

## 2025-04-28 ASSESSMENT — PAIN DESCRIPTION - DESCRIPTORS
DESCRIPTORS: OTHER (COMMENT)
DESCRIPTORS: ACHING
DESCRIPTORS: ACHING

## 2025-04-28 ASSESSMENT — PAIN DESCRIPTION - PROGRESSION
CLINICAL_PROGRESSION: GRADUALLY IMPROVING
CLINICAL_PROGRESSION: NOT CHANGED

## 2025-04-28 ASSESSMENT — PAIN SCALES - GENERAL
PAINLEVEL_OUTOF10: 9
PAINLEVEL_OUTOF10: 10 - WORST POSSIBLE PAIN
PAINLEVEL_OUTOF10: 6

## 2025-04-28 ASSESSMENT — PAIN DESCRIPTION - LOCATION
LOCATION: BACK

## 2025-04-28 NOTE — ED PROVIDER NOTES
HPI   Chief Complaint   Patient presents with    Back Pain     My back's been bothering me a few months, today it's worse, it's a 10       Patient is a 64-year-old male with history of hypertension, diabetes, low back pain status post surgery, remote kidney cancer in remission who presents for back pain.  Patient states has been going on for about 10 months, located in the left low back.   had similar pain in his right side, had surgery for about a year ago.  Denies any recent trauma.  No fevers, chills, saddle esthesia, urinary tension, bowel control problems, anticoagulation use, IV drug use.  No dysuria, hematuria, polyuria.  Denies tobacco or alcohol.  States he is able to ambulate with a cane, can perform his ADLs.              Patient History   Medical History[1]  Surgical History[2]  Family History[3]  Social History[4]    Physical Exam   ED Triage Vitals [04/28/25 0704]   Temperature Heart Rate Respirations BP   37.1 °C (98.8 °F) 99 18 167/83      Pulse Ox Temp Source Heart Rate Source Patient Position   95 % Temporal Monitor Sitting      BP Location FiO2 (%)     Right arm --       Physical Exam  Constitutional:       General: He is not in acute distress.  HENT:      Head: Normocephalic.   Eyes:      Extraocular Movements: Extraocular movements intact.      Conjunctiva/sclera: Conjunctivae normal.      Pupils: Pupils are equal, round, and reactive to light.   Cardiovascular:      Rate and Rhythm: Normal rate and regular rhythm.      Pulses: Normal pulses.      Heart sounds: Normal heart sounds.   Pulmonary:      Effort: Pulmonary effort is normal.      Breath sounds: Normal breath sounds.   Abdominal:      General: There is no distension.      Palpations: Abdomen is soft. There is no mass.      Tenderness: There is no abdominal tenderness. There is left CVA tenderness. There is no right CVA tenderness or guarding.   Musculoskeletal:         General: No deformity.      Cervical back: Normal range of  motion and neck supple.      Right lower leg: No edema.      Left lower leg: No edema.      Comments: TTP over left low back, mild midline TTP, no overlying warmth/erythema, step-offs/deformities.    Skin:     General: Skin is warm and dry.      Findings: No lesion or rash.   Neurological:      General: No focal deficit present.      Mental Status: He is alert and oriented to person, place, and time. Mental status is at baseline.      Cranial Nerves: No cranial nerve deficit.      Sensory: No sensory deficit.      Motor: No weakness.   Psychiatric:         Mood and Affect: Mood normal.           ED Course & MDM   Diagnoses as of 04/28/25 1554   Left-sided low back pain with left-sided sciatica, unspecified chronicity                 No data recorded     [place] Coma Scale Score: 15 (04/28/25 0702 : Renate Cadet RN)                           Medical Decision Making  Patient is an 64-year-old male with above-stated past medical history who presents for back pain.  Patient has no red flag symptoms on his history of physical exam to suggest spinal dural abscess, cauda equina, cord compression syndrome.  I have low suspicion for these.  Patient CMP is grossly unremarkable.  Lipase is not consistent with pancreatitis.  CBC shows no leukocytosis, anemia is at baseline.  Urinalysis is not consistent with urinary tract infection.  Low suspicion for ACS.  CT abdomen pelvis showed a right renal cyst and possible varices.  Patient will follow-up with his physicians regarding these.  Patient CT lumbar spine showed a left L2 vertebral body lucency.  I reviewed this with  who did not feel the patient required emergent MRI.  Patient was given a course of steroids, muscle relaxers and instructed on close outpatient follow-up with orthopedics, he will try to contact his orthopedic surgeon first.  I discussed return precautions with him, he stated understanding agreement.  Patient was discharged home in stable  condition.    Disclaimer: This note was dictated using speech recognition software. Minor errors in transcription may be present. Please call if questions.     Joseph Mckeon MD  [facility]  Contact on [employer]        Problems Addressed:  Left-sided low back pain with left-sided sciatica, unspecified chronicity: acute illness or injury    Amount and/or Complexity of Data Reviewed  Labs: ordered.  Radiology: ordered.        Procedure  Procedures       [1]   Past Medical History:  Diagnosis Date    Cirrhosis (Multi)     Diabetes mellitus (Multi)     Hypertension     Other conditions influencing health status 11/03/2013    Ureterocele    Personal history of other diseases of the digestive system     History of gastroesophageal reflux (GERD)    Personal history of other endocrine, nutritional and metabolic disease     History of hyperlipidemia   [2]   Past Surgical History:  Procedure Laterality Date    HERNIA REPAIR  07/30/2014    Hernia Repair    KNEE ARTHROSCOPY W/ DEBRIDEMENT  07/30/2014    Arthroscopy Knee Right    OTHER SURGICAL HISTORY  08/18/2014    Percutaneous Ablation Of Renal Tumor(S)    OTHER SURGICAL HISTORY  08/18/2014    Kidney Surgery Laparoscopic Partial Nephrectomy    TONSILLECTOMY  07/30/2014    Tonsillectomy   [3] No family history on file.  [4]   Social History  Tobacco Use    Smoking status: Never     Passive exposure: Never    Smokeless tobacco: Never   Vaping Use    Vaping status: Never Used   Substance Use Topics    Alcohol use: Defer    Drug use: Never        Joseph Mckeon MD  04/28/25 2802

## 2025-05-16 NOTE — PROGRESS NOTES
Patient Name: Will Bravo : 1960        Date: 2025      Type of Appt: Follow up    Reason for appt: New Back Pain     Pt last seen by  Dr. Kingston on 2024    Studies done: 2025 MRI at  (patient aware to bring disc)     Surgeries: 2024 Right bilateral L3-4 microdecompression diskectomy     Conservative Treatments (Have you ever tried any)  Physical Therapy in the last 6 months to a year: Yes   NSAID's: Yes   Narcotics: Yes   Muscle relaxants: Yes   Epidural injections: Yes     Any Blood Thinners: [x] Yes   []  No        [x] Aspirin   [] Eliquis   [] Xarelto   [] Pletal   [] Plavix    [] Warfarin        Diabetic:  [x] Yes   [] No   If yes, prescribed insulin:  [x] Yes   []  No   Humalog Kwikpen 200 unit/mL  Insulin Glargine 2 unit dial, 300 unit/mL    Weight loss medications:   [x] Yes  [] No     [x] Ozempic   [] Wegovy    [] Trulicity    [] Mounjaro         Smoking : [] Yes   [x]  No Former                         [facility]  [address] [place] [id number]  P: [phone]  F: [phone]          Patient:  Will Bravo  YOB: 1960  Date: 2025    The patient is a 64 y.o. male who presents today for evaluation of the following problems:     Chief Complaint   Patient presents with    Follow-up     Patient presents today for a Follow up to review MRI of Lumbar done on 25 at [facility]  Symptoms pain is going from the back into the legs numbness and tingles   Pain 8/10        Referred by No ref. provider found      PAST MEDICAL, FAMILY AND SOCIAL HISTORY:  Past Medical History:   Diagnosis Date    Hypertension     Pancreatitis     Type II or unspecified type diabetes mellitus without mention of complication, not stated as uncontrolled      Past Surgical History:   Procedure Laterality Date    CT CRYOABLATION OF RENAL TUMOR  2019    CT CRYO ABLATION RENAL TUMOR    KNEE ARTHROSCOPY Left     [facility]    LAMINECTOMY N/A 2024

## 2025-05-18 ENCOUNTER — HOSPITAL ENCOUNTER (EMERGENCY)
Facility: HOSPITAL | Age: 65
Discharge: HOME | End: 2025-05-18
Payer: COMMERCIAL

## 2025-05-18 VITALS
HEART RATE: 83 BPM | SYSTOLIC BLOOD PRESSURE: 147 MMHG | HEIGHT: 70 IN | BODY MASS INDEX: 40.09 KG/M2 | OXYGEN SATURATION: 96 % | WEIGHT: 280 LBS | DIASTOLIC BLOOD PRESSURE: 67 MMHG | RESPIRATION RATE: 16 BRPM | TEMPERATURE: 98.1 F

## 2025-05-18 DIAGNOSIS — M54.32 SCIATICA OF LEFT SIDE: Primary | ICD-10-CM

## 2025-05-18 PROCEDURE — 99284 EMERGENCY DEPT VISIT MOD MDM: CPT

## 2025-05-18 PROCEDURE — 2500000004 HC RX 250 GENERAL PHARMACY W/ HCPCS (ALT 636 FOR OP/ED): Mod: JZ | Performed by: PHYSICIAN ASSISTANT

## 2025-05-18 PROCEDURE — 2500000001 HC RX 250 WO HCPCS SELF ADMINISTERED DRUGS (ALT 637 FOR MEDICARE OP): Performed by: PHYSICIAN ASSISTANT

## 2025-05-18 PROCEDURE — 2500000005 HC RX 250 GENERAL PHARMACY W/O HCPCS: Performed by: PHYSICIAN ASSISTANT

## 2025-05-18 PROCEDURE — 96372 THER/PROPH/DIAG INJ SC/IM: CPT | Performed by: PHYSICIAN ASSISTANT

## 2025-05-18 RX ORDER — ORPHENADRINE CITRATE 30 MG/ML
60 INJECTION INTRAMUSCULAR; INTRAVENOUS ONCE
Status: COMPLETED | OUTPATIENT
Start: 2025-05-18 | End: 2025-05-18

## 2025-05-18 RX ORDER — ACETAMINOPHEN 325 MG/1
975 TABLET ORAL ONCE
Status: COMPLETED | OUTPATIENT
Start: 2025-05-18 | End: 2025-05-18

## 2025-05-18 RX ORDER — LIDOCAINE 560 MG/1
2 PATCH PERCUTANEOUS; TOPICAL; TRANSDERMAL ONCE
Status: DISCONTINUED | OUTPATIENT
Start: 2025-05-18 | End: 2025-05-18 | Stop reason: HOSPADM

## 2025-05-18 RX ORDER — METHOCARBAMOL 500 MG/1
1000 TABLET, FILM COATED ORAL 4 TIMES DAILY PRN
Qty: 30 TABLET | Refills: 0 | Status: SHIPPED | OUTPATIENT
Start: 2025-05-18

## 2025-05-18 RX ORDER — MORPHINE SULFATE 4 MG/ML
4 INJECTION, SOLUTION INTRAMUSCULAR; INTRAVENOUS ONCE
Status: COMPLETED | OUTPATIENT
Start: 2025-05-18 | End: 2025-05-18

## 2025-05-18 RX ADMIN — ORPHENADRINE CITRATE 60 MG: 60 INJECTION INTRAMUSCULAR; INTRAVENOUS at 11:36

## 2025-05-18 RX ADMIN — ACETAMINOPHEN 975 MG: 325 TABLET ORAL at 11:36

## 2025-05-18 RX ADMIN — LIDOCAINE 2 PATCH: 4 PATCH TOPICAL at 11:36

## 2025-05-18 RX ADMIN — MORPHINE SULFATE 4 MG: 4 INJECTION, SOLUTION INTRAMUSCULAR; INTRAVENOUS at 11:36

## 2025-05-18 ASSESSMENT — LIFESTYLE VARIABLES
HAVE PEOPLE ANNOYED YOU BY CRITICIZING YOUR DRINKING: NO
EVER FELT BAD OR GUILTY ABOUT YOUR DRINKING: NO
HAVE YOU EVER FELT YOU SHOULD CUT DOWN ON YOUR DRINKING: NO
TOTAL SCORE: 0
EVER HAD A DRINK FIRST THING IN THE MORNING TO STEADY YOUR NERVES TO GET RID OF A HANGOVER: NO

## 2025-05-18 ASSESSMENT — COLUMBIA-SUICIDE SEVERITY RATING SCALE - C-SSRS
6. HAVE YOU EVER DONE ANYTHING, STARTED TO DO ANYTHING, OR PREPARED TO DO ANYTHING TO END YOUR LIFE?: NO
1. IN THE PAST MONTH, HAVE YOU WISHED YOU WERE DEAD OR WISHED YOU COULD GO TO SLEEP AND NOT WAKE UP?: NO
2. HAVE YOU ACTUALLY HAD ANY THOUGHTS OF KILLING YOURSELF?: NO

## 2025-05-18 ASSESSMENT — PAIN DESCRIPTION - ORIENTATION: ORIENTATION: LOWER

## 2025-05-18 ASSESSMENT — PAIN DESCRIPTION - LOCATION: LOCATION: BACK

## 2025-05-18 ASSESSMENT — PAIN DESCRIPTION - DESCRIPTORS: DESCRIPTORS: ACHING;SHARP

## 2025-05-18 ASSESSMENT — PAIN - FUNCTIONAL ASSESSMENT: PAIN_FUNCTIONAL_ASSESSMENT: 0-10

## 2025-05-18 ASSESSMENT — PAIN SCALES - GENERAL: PAINLEVEL_OUTOF10: 10 - WORST POSSIBLE PAIN

## 2025-05-18 ASSESSMENT — PAIN DESCRIPTION - PAIN TYPE: TYPE: ACUTE PAIN

## 2025-05-18 NOTE — ED PROVIDER NOTES
HPI   Chief Complaint   Patient presents with    Back Pain       This is a 64-year-old male with PMH HTN, T2DM, low back pain status post surgery, remote kidney CA presenting for evaluation of atraumatic left-sided low back pain.  Radiates from his low back to his left buttock.  He was seen here on April 28 for the same complaint and states he was given medications and steroids and he felt better and then was doing yard work a couple days ago which flared up his pain again.  Feels the same as his prior visit.  He has MRI scheduled for this coming Friday.  Denies anticoagulation. Denies falls, trauma. Denies saddle anesthesia. Denies bowel or bladder changes. Denies fevers, chills, history of IV drug use. Denies weakness, numbness, tingling, loss of sensation. Denies chest pain, shortness of breath, abdominal pain, urinary symptoms.       History provided by:  Patient   used: No            Patient History   Medical History[1]  Surgical History[2]  Family History[3]  Social History[4]    Physical Exam   ED Triage Vitals [05/18/25 1033]   Temperature Heart Rate Respirations BP   36.7 °C (98.1 °F) 97 18 151/70      Pulse Ox Temp src Heart Rate Source Patient Position   98 % -- -- --      BP Location FiO2 (%)     -- --       Physical Exam    Gen.: Vitals noted. No apparent distress. Afebrile.  Neck: No meningismus. No midline or paraspinal tenderness.  Heart: Regular rate and rhythm. No murmur.  Lungs: Clear to auscultation bilaterally with good aeration and no adventitious breath sounds.   Abdomen: Soft, nontender   Back: There is tenderness to palpation in the left lumbar paraspinal musculature and left buttock. No midline tenderness.  Positive left passive straight leg raise.  Extremities: Normal motor sensory, strong equal peripheral pulses Neurovascularly intact throughout.  Skin: No rash on back  Neuro: No focal deficits    ED Course & MDM   Diagnoses as of 05/18/25 1243   Sciatica of left side                  No data recorded                                 Medical Decision Making  DDx: exacerbation of chronic back pain, fracture, subluxation, herniated nucleus pulposus, epidural abscess/hematoma, vertebral osteomyelitis, discitis, cauda equina syndrome, AAA, retroperitoneal hemorrhage, intra-abdominal pathology or myofascial/mechanical etiology    Patient is stable hemodynamically. Visibly non-toxic appearing in no apparent distress. Neurovascularly intact on exam. Patient has no red flag neurologic signs or symptoms either in history or on physical exam. Low suspicion for cauda equina/ cord compression at this time.  Is not on thinners so I have lower suspicion for hematoma or retroperitoneal bleed.  Has soft nontender abdomen so I have lower suspicion for intra-abdominal pathology.  He has no fever or infectious symptoms so I have lower suspicion for osteomyelitis or discitis or abscess.  States he feels the same as his prior visit for back pain.  He was given IM Norflex 60 mg oral Tylenol 975 mg, topical lidocaine patch, IM morphine 4 mg.  On reevaluation his pain is improved.  I do not feel further imaging is indicated today I feel would be best served for him to get his MRI this coming week.  He is diabetic so I will hold off on giving further steroids but will give prescription for Robaxin and was advised he can use OTC analgesics as needed as well.  Instructed to return to the nearest ED if any concerns or new or worsening symptoms. Patient verbalized understanding and agreement with plan. Discharged in stable condition.      Disclaimer: This note was dictated using speech recognition software. An attempt at proofreading was made to minimize errors. Minor errors in transcription may be present.         Procedure  Procedures       [1]   Past Medical History:  Diagnosis Date    Cirrhosis (Multi)     Diabetes mellitus (Multi)     History of kidney cancer     Hypertension     Other conditions  influencing health status 11/03/2013    Ureterocele    Personal history of other diseases of the digestive system     History of gastroesophageal reflux (GERD)    Personal history of other endocrine, nutritional and metabolic disease     History of hyperlipidemia   [2]   Past Surgical History:  Procedure Laterality Date    HERNIA REPAIR  07/30/2014    Hernia Repair    KNEE ARTHROSCOPY W/ DEBRIDEMENT  07/30/2014    Arthroscopy Knee Right    OTHER SURGICAL HISTORY  08/18/2014    Percutaneous Ablation Of Renal Tumor(S)    OTHER SURGICAL HISTORY  08/18/2014    Kidney Surgery Laparoscopic Partial Nephrectomy    TONSILLECTOMY  07/30/2014    Tonsillectomy   [3] No family history on file.  [4]   Social History  Tobacco Use    Smoking status: Never     Passive exposure: Never    Smokeless tobacco: Never   Vaping Use    Vaping status: Never Used   Substance Use Topics    Alcohol use: Defer    Drug use: Never        Rory De Jesus PA-C  05/18/25 1248

## 2025-05-18 NOTE — DISCHARGE INSTRUCTIONS
You can take ibuprofen 600 mg every 6-8 hours.  You can take Tylenol 650 mg every 4-6 hours.  You can use topical lidocaine patches and leave one on for up to 12 hours.  Take all medications as directed.  Please return to the nearest emergency department if you have any concerns or new or worsening symptoms.

## 2025-05-23 ENCOUNTER — TELEPHONE (OUTPATIENT)
Age: 65
End: 2025-05-23

## 2025-05-23 NOTE — TELEPHONE ENCOUNTER
Called and spoke with patient regarding imaging. Patient stated that when he went for his MRI that the facility stated they would not provide a disc with imaging. I reached out to [facility] to verify and the office staff cleared up some confusion. The facility in [place] is not able to provide discs at this time however patient could go to [place] location to obtain disc. A request was faxed to have imaging pushed to [facility]. Called patient back with an update regarding the images being pushed and where to  a copy of his disc in the event we are not able to view pushed imaging. Patient voiced understanding and was aggreeable.

## 2025-05-23 NOTE — TELEPHONE ENCOUNTER
Patient had MRI done at [facility]   Patient said [facility] will not give him the images from the MRI   I explained that we have the report but that he would have to get the images on disk.    Again he said [facility] will not release them.  Is there a different way to obtain the study for this patients appointment on Tuesday?    Please reach out to patient and advise    Billing Type: Third-Party Bill

## 2025-05-27 ENCOUNTER — OFFICE VISIT (OUTPATIENT)
Age: 65
End: 2025-05-27
Payer: MEDICARE

## 2025-05-27 VITALS — RESPIRATION RATE: 18 BRPM | WEIGHT: 275 LBS | BODY MASS INDEX: 39.37 KG/M2 | HEIGHT: 70 IN

## 2025-05-27 DIAGNOSIS — M48.061 MYELOPATHY CONCURRENT WITH AND DUE TO STENOSIS OF LUMBAR SPINE (HCC): ICD-10-CM

## 2025-05-27 DIAGNOSIS — Z79.01 CHRONIC ANTICOAGULATION: ICD-10-CM

## 2025-05-27 DIAGNOSIS — E11.9 DIABETES MELLITUS TYPE 2, INSULIN DEPENDENT (HCC): Primary | ICD-10-CM

## 2025-05-27 DIAGNOSIS — G99.2 MYELOPATHY CONCURRENT WITH AND DUE TO STENOSIS OF LUMBAR SPINE (HCC): ICD-10-CM

## 2025-05-27 DIAGNOSIS — E66.813 CLASS 3 SEVERE OBESITY DUE TO EXCESS CALORIES WITH SERIOUS COMORBIDITY AND BODY MASS INDEX (BMI) OF 40.0 TO 44.9 IN ADULT (HCC): ICD-10-CM

## 2025-05-27 DIAGNOSIS — Z79.4 DIABETES MELLITUS TYPE 2, INSULIN DEPENDENT (HCC): Primary | ICD-10-CM

## 2025-05-27 PROCEDURE — G8417 CALC BMI ABV UP PARAM F/U: HCPCS

## 2025-05-27 PROCEDURE — 3046F HEMOGLOBIN A1C LEVEL >9.0%: CPT

## 2025-05-27 PROCEDURE — G8427 DOCREV CUR MEDS BY ELIG CLIN: HCPCS

## 2025-05-27 PROCEDURE — 2022F DILAT RTA XM EVC RTNOPTHY: CPT

## 2025-05-27 PROCEDURE — 1036F TOBACCO NON-USER: CPT

## 2025-05-27 PROCEDURE — 3017F COLORECTAL CA SCREEN DOC REV: CPT

## 2025-05-27 PROCEDURE — 99214 OFFICE O/P EST MOD 30 MIN: CPT

## 2025-05-27 PROCEDURE — 99213 OFFICE O/P EST LOW 20 MIN: CPT

## 2025-05-27 RX ORDER — CYCLOBENZAPRINE HCL 10 MG
10 TABLET ORAL 2 TIMES DAILY PRN
COMMUNITY
Start: 2025-04-28

## 2025-05-27 RX ORDER — ASPIRIN 81 MG/1
81 TABLET, CHEWABLE ORAL DAILY
COMMUNITY
Start: 2025-04-21

## 2025-05-27 RX ORDER — SEMAGLUTIDE 0.68 MG/ML
INJECTION, SOLUTION SUBCUTANEOUS
COMMUNITY

## 2025-05-27 RX ORDER — PREGABALIN 75 MG/1
CAPSULE ORAL
COMMUNITY

## 2025-05-27 RX ORDER — OXYCODONE AND ACETAMINOPHEN 5; 325 MG/1; MG/1
1 TABLET ORAL EVERY 6 HOURS PRN
Qty: 56 TABLET | Refills: 0 | Status: SHIPPED | OUTPATIENT
Start: 2025-05-27 | End: 2025-06-10

## 2025-05-27 ASSESSMENT — ENCOUNTER SYMPTOMS
BACK PAIN: 1
DIARRHEA: 0
SHORTNESS OF BREATH: 0
CONSTIPATION: 0
NAUSEA: 0

## 2025-05-27 NOTE — PATIENT INSTRUCTIONS
Welcome to our practice and to our [facility]! Thank you for choosing us as your health care provider.  In the coming days, you may receive a survey about your visit via text or email. Please take a few minutes to answer these   questions. As our patient, we value your opinion and appreciate your feedback about your [facility] Experience.    The Team That Took Care of you Today:    Care Provider(s): Dr. Kingston and Carlos WINSLOW    Associate(s):______________________________________________       We're looking forward to seeing you at your upcoming appointment     Now you can save time and skip the clipboard! Pre-Registration lets you complete your appointment paperwork and pay your copay directly from your MyChart. Then, when you arrive for your appointment, simply stop at central check in, located on the first floor, and then proceed to the suite. We are located on the first floor in [address], right next to central check in.     We are committed to providing you with exceptional care and look forward to seeing you at your upcoming appointment. If you have any questions or concerns, please do not hesitate to reach out to us.

## (undated) DEVICE — KIT EVAC 400CC PVC RADPQ Y CONN

## (undated) DEVICE — DRAIN SURG 10FR 100% SIL RND END PERF W/ TRCR

## (undated) DEVICE — SUTURE VICRYL SZ 0 L27IN ABSRB UD L26MM CP-2 1/2 CIR SGL J870H

## (undated) DEVICE — FLOSEAL WITH RECOTHROM - 10ML.: Brand: FLOSEAL HEMOSTATIC MATRIX

## (undated) DEVICE — GLOVE ORANGE PI 8   MSG9080

## (undated) DEVICE — SPONGE DRN W4XL4IN RAYON/POLYESTER 6 PLY NONWOVEN PRECUT 2 PER PK

## (undated) DEVICE — 4-PORT MANIFOLD: Brand: NEPTUNE 2

## (undated) DEVICE — CARBIDE MATCH HEAD

## (undated) DEVICE — NEEDLE HYPO 25GA L1.5IN BLU POLYPR HUB S STL REG BVL STR

## (undated) DEVICE — LABEL MED MINI W/ MARKER

## (undated) DEVICE — SYRINGE MEDICAL 3ML CLEAR PLASTIC STANDARD NON CONTROL LUERLOCK TIP DISPOSABLE

## (undated) DEVICE — NEURO: Brand: MEDLINE INDUSTRIES, INC.

## (undated) DEVICE — COVER MICSCP W46XL120IN 4 BINOC GLS LENS LEICA

## (undated) DEVICE — GLOVE ORANGE PI 7 1/2   MSG9075

## (undated) DEVICE — BANDAGE ADH W0.75XL3IN UNIV WVN FAB NAT GEN USE STRP N ADH

## (undated) DEVICE — HYPODERMIC SAFETY NEEDLE: Brand: MAGELLAN

## (undated) DEVICE — NEEDLE SPINAL 22GA L3.5IN SPINOCAN

## (undated) DEVICE — SHEET,DRAPE,53X77,STERILE: Brand: MEDLINE

## (undated) DEVICE — KAIRISON TUBING SET PNEUMATIC, (3000 MM), STERILE, DISPOSABLE, TO BE USED WITH: FK898R, PACKAGE OF 10 PIECES: Brand: KAIRISON

## (undated) DEVICE — SUTURE VICRYL + SZ 2-0 L27IN ABSRB UD CP-1 1/2 CIR REV CUT VCP266H

## (undated) DEVICE — SYRINGE MED 30ML STD CLR PLAS LUERLOCK TIP N CTRL DISP

## (undated) DEVICE — APPLICATOR MEDICATED 10.5 CC SOLUTION HI LT ORNG CHLORAPREP

## (undated) DEVICE — SUTURE VICRYL SZ 3-0 L18IN ABSRB UD PS-2 L19MM 3/8 CRV PRIM J497H

## (undated) DEVICE — SUTURE VICRYL SZ 4-0 L18IN ABSRB UD L19MM PS-2 3/8 CIR PRIM J496H

## (undated) DEVICE — LIQUIBAND RAPID ADHESIVE 36/CS 0.8ML: Brand: MEDLINE

## (undated) DEVICE — SUTURE ABSORBABLE ANTIBACT 1-0 CT-1 24 IN STRATAFIX PDS + SXPP1A443

## (undated) DEVICE — 1010 S-DRAPE TOWEL DRAPE 10/BX: Brand: STERI-DRAPE™